# Patient Record
Sex: FEMALE | Race: ASIAN | NOT HISPANIC OR LATINO | Employment: OTHER | RURAL
[De-identification: names, ages, dates, MRNs, and addresses within clinical notes are randomized per-mention and may not be internally consistent; named-entity substitution may affect disease eponyms.]

---

## 2020-06-29 ENCOUNTER — HISTORICAL (OUTPATIENT)
Dept: ADMINISTRATIVE | Facility: HOSPITAL | Age: 80
End: 2020-06-29

## 2020-06-29 LAB
ALBUMIN SERPL BCP-MCNC: 4.1 G/DL (ref 3.5–5)
ALBUMIN/GLOB SERPL: 1 {RATIO}
ALP SERPL-CCNC: 83 U/L (ref 55–142)
ALT SERPL W P-5'-P-CCNC: 20 U/L (ref 13–56)
AST SERPL W P-5'-P-CCNC: 22 U/L (ref 15–37)
BASOPHILS # BLD AUTO: 0.03 X10E3/UL (ref 0–0.2)
BASOPHILS NFR BLD AUTO: 0.6 % (ref 0–1)
BILIRUB SERPL-MCNC: 0.3 MG/DL (ref 0–1.2)
BUN SERPL-MCNC: 21 MG/DL (ref 7–18)
BUN/CREAT SERPL: 14
CALCIUM SERPL-MCNC: 8.6 MG/DL (ref 8.5–10.1)
CHLORIDE SERPL-SCNC: 101 MMOL/L (ref 98–107)
CHOLEST SERPL-MCNC: 167 MG/DL
CO2 SERPL-SCNC: 23 MMOL/L (ref 21–32)
CREAT SERPL-MCNC: 1.51 MG/DL (ref 0.5–1.02)
EOSINOPHIL # BLD AUTO: 0.1 X10E3/UL (ref 0–0.5)
EOSINOPHIL NFR BLD AUTO: 2.1 % (ref 1–4)
ERYTHROCYTE [DISTWIDTH] IN BLOOD BY AUTOMATED COUNT: 12.7 % (ref 11.5–14.5)
EST. AVERAGE GLUCOSE BLD GHB EST-MCNC: 120 MG/DL
GLOBULIN SER-MCNC: 4 G/DL (ref 2–4)
GLUCOSE SERPL-MCNC: 101 MG/DL (ref 74–106)
HBA1C MFR BLD HPLC: 6.2 %
HCT VFR BLD AUTO: 32.2 % (ref 38–47)
HDLC SERPL-MCNC: 47 MG/DL
HGB BLD-MCNC: 10.3 G/DL (ref 12–16)
IMM GRANULOCYTES # BLD AUTO: 0.01 X10E3/UL (ref 0–0.04)
IMM GRANULOCYTES NFR BLD: 0.2 % (ref 0–0.4)
LDLC SERPL CALC-MCNC: 95 MG/DL
LYMPHOCYTES # BLD AUTO: 1.24 X10E3/UL (ref 1–4.8)
LYMPHOCYTES NFR BLD AUTO: 26.1 % (ref 27–41)
MCH RBC QN AUTO: 28.8 PG (ref 27–31)
MCHC RBC AUTO-ENTMCNC: 32 G/DL (ref 32–36)
MCV RBC AUTO: 89.9 FL (ref 80–96)
MONOCYTES # BLD AUTO: 0.31 X10E3/UL (ref 0–0.8)
MONOCYTES NFR BLD AUTO: 6.5 % (ref 2–6)
MPC BLD CALC-MCNC: 10.2 FL (ref 9.4–12.4)
NEUTROPHILS # BLD AUTO: 3.07 X10E3/UL (ref 1.8–7.7)
NEUTROPHILS NFR BLD AUTO: 64.5 % (ref 53–65)
NONHDLC SERPL-MCNC: 120 MG/DL
NRBC # BLD AUTO: 0 X10E3/UL (ref 0–0)
NRBC, AUTO (.00): 0 /100 (ref 0–0)
PLATELET # BLD AUTO: 260 X10E3/UL (ref 150–400)
POTASSIUM SERPL-SCNC: 4.4 MMOL/L (ref 3.5–5.1)
PROT SERPL-MCNC: 8.1 G/DL (ref 6.4–8.2)
RBC # BLD AUTO: 3.58 X10E6/UL (ref 4.2–5.4)
SODIUM SERPL-SCNC: 132 MMOL/L (ref 136–145)
TRIGL SERPL-MCNC: 126 MG/DL
TSH SERPL DL<=0.005 MIU/L-ACNC: 0.55 UIU/ML (ref 0.36–3.74)
VLDLC SERPL-MCNC: 25 MG/DL
WBC # BLD AUTO: 4.76 X10E3/UL (ref 4.5–11)

## 2020-06-30 LAB — LEVETIRACETAM SERPL-MCNC: 38.8 UG/ML (ref 12–46)

## 2021-04-27 RX ORDER — LEVETIRACETAM 500 MG/1
500 TABLET ORAL 2 TIMES DAILY
COMMUNITY
End: 2021-09-13 | Stop reason: SDUPTHER

## 2021-05-10 ENCOUNTER — OFFICE VISIT (OUTPATIENT)
Dept: NEUROLOGY | Facility: CLINIC | Age: 81
End: 2021-05-10
Payer: MEDICARE

## 2021-05-10 VITALS
WEIGHT: 146 LBS | SYSTOLIC BLOOD PRESSURE: 106 MMHG | HEART RATE: 76 BPM | OXYGEN SATURATION: 93 % | DIASTOLIC BLOOD PRESSURE: 60 MMHG

## 2021-05-10 DIAGNOSIS — I63.9 CEREBROVASCULAR ACCIDENT (CVA), UNSPECIFIED MECHANISM: ICD-10-CM

## 2021-05-10 DIAGNOSIS — R56.9 SEIZURE: Primary | ICD-10-CM

## 2021-05-10 DIAGNOSIS — G81.94 LEFT HEMIPARESIS: ICD-10-CM

## 2021-05-10 PROCEDURE — 99213 OFFICE O/P EST LOW 20 MIN: CPT | Mod: PBBFAC | Performed by: NURSE PRACTITIONER

## 2021-05-10 PROCEDURE — 99213 OFFICE O/P EST LOW 20 MIN: CPT | Mod: PBBFAC,,, | Performed by: NURSE PRACTITIONER

## 2021-05-10 PROCEDURE — 99214 OFFICE O/P EST MOD 30 MIN: CPT | Mod: S$PBB,,, | Performed by: NURSE PRACTITIONER

## 2021-05-10 PROCEDURE — 99214 PR OFFICE/OUTPT VISIT, EST, LEVL IV, 30-39 MIN: ICD-10-PCS | Mod: S$PBB,,, | Performed by: NURSE PRACTITIONER

## 2021-05-10 PROCEDURE — 99213 HC OFFICE/OUTPT VISIT, EST, LEVL III, 20-29 MIN: ICD-10-PCS | Mod: PBBFAC,,, | Performed by: NURSE PRACTITIONER

## 2021-05-10 RX ORDER — ATORVASTATIN CALCIUM 20 MG/1
TABLET, FILM COATED ORAL
COMMUNITY
Start: 2021-03-01 | End: 2022-06-21 | Stop reason: SDUPTHER

## 2021-05-10 RX ORDER — AMLODIPINE BESYLATE 5 MG/1
TABLET ORAL
COMMUNITY
Start: 2021-03-01 | End: 2022-06-21 | Stop reason: SDUPTHER

## 2021-05-10 RX ORDER — OMEPRAZOLE 20 MG/1
CAPSULE, DELAYED RELEASE ORAL
COMMUNITY
Start: 2021-03-01 | End: 2022-06-21 | Stop reason: SDUPTHER

## 2021-05-10 RX ORDER — PIOGLITAZONEHYDROCHLORIDE 30 MG/1
TABLET ORAL
COMMUNITY
Start: 2021-02-12 | End: 2022-06-21 | Stop reason: SDUPTHER

## 2021-09-13 ENCOUNTER — OFFICE VISIT (OUTPATIENT)
Dept: NEUROLOGY | Facility: CLINIC | Age: 81
End: 2021-09-13
Payer: MEDICARE

## 2021-09-13 VITALS
DIASTOLIC BLOOD PRESSURE: 60 MMHG | HEIGHT: 62 IN | SYSTOLIC BLOOD PRESSURE: 110 MMHG | WEIGHT: 145 LBS | BODY MASS INDEX: 26.68 KG/M2 | OXYGEN SATURATION: 96 % | HEART RATE: 70 BPM

## 2021-09-13 DIAGNOSIS — R42 DIZZINESS: ICD-10-CM

## 2021-09-13 DIAGNOSIS — I63.9 CEREBROVASCULAR ACCIDENT (CVA), UNSPECIFIED MECHANISM: ICD-10-CM

## 2021-09-13 DIAGNOSIS — R56.9 SEIZURE: Primary | ICD-10-CM

## 2021-09-13 DIAGNOSIS — G81.94 LEFT HEMIPARESIS: ICD-10-CM

## 2021-09-13 PROCEDURE — 99213 PR OFFICE/OUTPT VISIT, EST, LEVL III, 20-29 MIN: ICD-10-PCS | Mod: S$PBB,,, | Performed by: NURSE PRACTITIONER

## 2021-09-13 PROCEDURE — 99214 OFFICE O/P EST MOD 30 MIN: CPT | Mod: PBBFAC | Performed by: NURSE PRACTITIONER

## 2021-09-13 PROCEDURE — 99213 OFFICE O/P EST LOW 20 MIN: CPT | Mod: S$PBB,,, | Performed by: NURSE PRACTITIONER

## 2021-09-13 RX ORDER — LEVETIRACETAM 500 MG/1
500 TABLET ORAL 2 TIMES DAILY
Qty: 180 TABLET | Refills: 3 | Status: SHIPPED | OUTPATIENT
Start: 2021-09-13 | End: 2022-06-21 | Stop reason: SDUPTHER

## 2021-09-13 RX ORDER — MECLIZINE HYDROCHLORIDE 25 MG/1
25 TABLET ORAL 3 TIMES DAILY PRN
Qty: 30 TABLET | Refills: 3 | Status: SHIPPED | OUTPATIENT
Start: 2021-09-13 | End: 2021-12-13 | Stop reason: SDUPTHER

## 2021-12-13 ENCOUNTER — OFFICE VISIT (OUTPATIENT)
Dept: NEUROLOGY | Facility: CLINIC | Age: 81
End: 2021-12-13
Payer: MEDICARE

## 2021-12-13 VITALS
OXYGEN SATURATION: 99 % | BODY MASS INDEX: 26.68 KG/M2 | HEIGHT: 62 IN | SYSTOLIC BLOOD PRESSURE: 114 MMHG | RESPIRATION RATE: 18 BRPM | HEART RATE: 104 BPM | DIASTOLIC BLOOD PRESSURE: 68 MMHG | WEIGHT: 145 LBS

## 2021-12-13 DIAGNOSIS — I63.9 CEREBROVASCULAR ACCIDENT (CVA), UNSPECIFIED MECHANISM: Chronic | ICD-10-CM

## 2021-12-13 DIAGNOSIS — G81.94 LEFT HEMIPARESIS: Chronic | ICD-10-CM

## 2021-12-13 DIAGNOSIS — R42 DIZZINESS: ICD-10-CM

## 2021-12-13 DIAGNOSIS — R56.9 SEIZURE: Primary | Chronic | ICD-10-CM

## 2021-12-13 PROCEDURE — 99213 OFFICE O/P EST LOW 20 MIN: CPT | Mod: S$PBB,,, | Performed by: NURSE PRACTITIONER

## 2021-12-13 PROCEDURE — 99214 OFFICE O/P EST MOD 30 MIN: CPT | Mod: PBBFAC | Performed by: NURSE PRACTITIONER

## 2021-12-13 PROCEDURE — 99213 PR OFFICE/OUTPT VISIT, EST, LEVL III, 20-29 MIN: ICD-10-PCS | Mod: S$PBB,,, | Performed by: NURSE PRACTITIONER

## 2021-12-13 RX ORDER — NAPROXEN 375 MG/1
375 TABLET ORAL 2 TIMES DAILY PRN
Qty: 60 TABLET | Refills: 0 | Status: SHIPPED | OUTPATIENT
Start: 2021-12-13 | End: 2022-03-14 | Stop reason: SDUPTHER

## 2021-12-13 RX ORDER — MECLIZINE HYDROCHLORIDE 25 MG/1
25 TABLET ORAL 3 TIMES DAILY PRN
Qty: 30 TABLET | Refills: 3 | Status: SHIPPED | OUTPATIENT
Start: 2021-12-13 | End: 2022-06-21

## 2022-03-14 ENCOUNTER — OFFICE VISIT (OUTPATIENT)
Dept: NEUROLOGY | Facility: CLINIC | Age: 82
End: 2022-03-14
Payer: MEDICARE

## 2022-03-14 VITALS
WEIGHT: 145 LBS | OXYGEN SATURATION: 95 % | HEIGHT: 62 IN | RESPIRATION RATE: 18 BRPM | BODY MASS INDEX: 26.68 KG/M2 | SYSTOLIC BLOOD PRESSURE: 112 MMHG | DIASTOLIC BLOOD PRESSURE: 68 MMHG | HEART RATE: 80 BPM

## 2022-03-14 DIAGNOSIS — M54.9 BACK PAIN, UNSPECIFIED BACK LOCATION, UNSPECIFIED BACK PAIN LATERALITY, UNSPECIFIED CHRONICITY: ICD-10-CM

## 2022-03-14 DIAGNOSIS — R42 DIZZINESS: ICD-10-CM

## 2022-03-14 DIAGNOSIS — I63.9 CEREBROVASCULAR ACCIDENT (CVA), UNSPECIFIED MECHANISM: ICD-10-CM

## 2022-03-14 DIAGNOSIS — G81.94 LEFT HEMIPARESIS: ICD-10-CM

## 2022-03-14 DIAGNOSIS — R56.9 SEIZURE: Primary | Chronic | ICD-10-CM

## 2022-03-14 PROCEDURE — 99213 PR OFFICE/OUTPT VISIT, EST, LEVL III, 20-29 MIN: ICD-10-PCS | Mod: S$PBB,,, | Performed by: NURSE PRACTITIONER

## 2022-03-14 PROCEDURE — 99213 OFFICE O/P EST LOW 20 MIN: CPT | Mod: S$PBB,,, | Performed by: NURSE PRACTITIONER

## 2022-03-14 PROCEDURE — 99214 OFFICE O/P EST MOD 30 MIN: CPT | Mod: PBBFAC | Performed by: NURSE PRACTITIONER

## 2022-03-14 RX ORDER — NAPROXEN 375 MG/1
375 TABLET ORAL 2 TIMES DAILY PRN
Qty: 60 TABLET | Refills: 2 | Status: SHIPPED | OUTPATIENT
Start: 2022-03-14 | End: 2022-06-21

## 2022-03-14 NOTE — PATIENT INSTRUCTIONS
1.  Continue current medication including keppra as directed    2. Notify clinic if any seizures    3. Antivert as needed for dizziness    4. Naproxen as directed as needed for lower back pain    Seizure Precautions:  1. Take medications as directed  2. Avoid open flames and hot surfaces such as fires and grills  3. Avoid elevations such as ladders or stools  4. Avoid swimming alone  5. Make sure getting plenty of sleep, recommend 7-8 hours per day  6. Avoid alcohol and illicit drugs  7. No driving or operating heavy machinery for 6 months after last known seizure    Stroke risk modifiers:    1. Good sleep habits, recommend at least 7 hours total sleep daily  2. Continue management of blood pressure and cholesterol including medications, exercise, and good eating habits  3. Exercise as much as possible, recommend 5 days of the week for 30 minutes each day  4. Avoid smoking and alcohol  5. Go to the nearest emergency department immediately if any new or worsening weakness, speech difficulty, or numbness

## 2022-03-14 NOTE — PROGRESS NOTES
Subjective:       Patient ID: Zbigniew Ventura is a 81 y.o. female     Chief Complaint:    No chief complaint on file.       Allergies:  Patient has no known allergies.    Current Medications:    Outpatient Encounter Medications as of 3/14/2022   Medication Sig Dispense Refill    amLODIPine (NORVASC) 5 MG tablet       atorvastatin (LIPITOR) 20 MG tablet       levETIRAcetam (KEPPRA) 500 MG Tab Take 1 tablet (500 mg total) by mouth 2 (two) times daily. 180 tablet 3    meclizine (ANTIVERT) 25 mg tablet Take 1 tablet (25 mg total) by mouth 3 (three) times daily as needed for Dizziness. 30 tablet 3    naproxen (EC-NAPROSYN) 375 MG TbEC EC tablet Take 1 tablet (375 mg total) by mouth 2 (two) times daily as needed (knee pain). 60 tablet 0    omeprazole (PRILOSEC) 20 MG capsule       pioglitazone (ACTOS) 30 MG tablet        No facility-administered encounter medications on file as of 3/14/2022.       History of Present Illness  This Is a 81-year-old past history of diabetes and right hemispheric stroke and seizure.    Her family in room assists with communication.    They deny any seizures since her last visit with us and no new CVA symptoms.  She is on triple therapy including ASA 81mg as well as keppra 500mg BID which is filled by her primary care and denies any side effects.    She does have residual left-sided weakness from stroke but it is minimal. In     They do report that she has occasional episodes of dizziness, this since her prior CVA.  Her  has let her use his prescribed mecline which worked very well and she inquires about her own prescription.         Review of Systems  Review of Systems   Constitutional: Negative for diaphoresis and fever.   HENT: Negative for congestion, hearing loss and tinnitus.    Eyes: Negative for blurred vision, double vision, photophobia, discharge and redness.   Respiratory: Negative for cough and shortness of breath.    Cardiovascular: Negative for chest pain.    Gastrointestinal: Negative for abdominal pain, nausea and vomiting.   Musculoskeletal: Positive for joint pain and myalgias. Negative for back pain, falls and neck pain.   Skin: Negative for itching and rash.   Neurological: Positive for dizziness and weakness. Negative for tremors, sensory change, speech change, focal weakness, seizures, loss of consciousness and headaches.   Psychiatric/Behavioral: Negative for depression, hallucinations and memory loss. The patient does not have insomnia.    All other systems reviewed and are negative.     Objective:     NEUROLOGICAL EXAMINATION:     MENTAL STATUS   Oriented to person, place, and time.   Attention: normal. Concentration: normal.   Speech: speech is normal   Level of consciousness: alert  Knowledge: good and consistent with education.   Normal comprehension.     CRANIAL NERVES     CN II   Visual fields full to confrontation.   Visual acuity: normal  Right visual field deficit: none  Left visual field deficit: none     CN III, IV, VI   Pupils are equal, round, and reactive to light.  Extraocular motions are normal.   Right pupil: Size: 3 mm. Shape: regular. Reactivity: brisk. Consensual response: intact. Accommodation: intact.   Left pupil: Size: 3 mm. Shape: regular. Reactivity: brisk. Consensual response: intact. Accommodation: intact.   CN III: no CN III palsy  CN VI: no CN VI palsy  Nystagmus: none   Diplopia: none  Upgaze: normal  Downgaze: normal  Conjugate gaze: present  Vestibulo-ocular reflex: present    CN V   Facial sensation intact.   Right facial sensation deficit: none  Left facial sensation deficit: none  Right corneal reflex: normal  Left corneal reflex: normal    CN VII   Facial expression full, symmetric.   Right facial weakness: none  Left facial weakness: none  Right taste: normal  Left taste: normal    CN VIII   CN VIII normal.   Hearing: intact    CN IX, X   CN IX normal.   CN X normal.   Palate: symmetric    CN XI   CN XI normal.   Right  sternocleidomastoid strength: normal  Left sternocleidomastoid strength: normal  Right trapezius strength: normal  Left trapezius strength: normal    CN XII   CN XII normal.   Tongue: not atrophic  Fasciculations: absent  Tongue deviation: none    MOTOR EXAM   Muscle bulk: normal  Overall muscle tone: normal  Right arm tone: normal  Left arm tone: normal  Right arm pronator drift: absent  Left arm pronator drift: absent  Right leg tone: normal  Left leg tone: normal    Strength   Right neck flexion: 5/5  Left neck flexion: 5/5  Right neck extension: 5/5  Left neck extension: 5/5  Right deltoid: 5/5  Left deltoid: 4/5  Right biceps: 5/5  Left biceps: 4/5  Right triceps: 5/5  Left triceps: 4/5  Right wrist flexion: 5/5  Left wrist flexion: 4/5  Right wrist extension: 5/5  Left wrist extension: 4/5  Right interossei: 5/5  Left interossei: 5/5  Right iliopsoas: 5/5  Left iliopsoas: 5/5  Right quadriceps: 5/5  Left quadriceps: 5/5  Right hamstrin/5  Left hamstrin/5  Right anterior tibial: 5/5  Left anterior tibial: 5/5  Right posterior tibial: 5/5  Left posterior tibial: 5/5  Right gastroc: 5/5  Left gastroc: 5/5    REFLEXES     Reflexes   Right brachioradialis: 2+  Left brachioradialis: 2+  Right biceps: 2+  Left biceps: 2+  Right triceps: 2+  Left triceps: 2+  Right patellar: 2+  Left patellar: 2+  Right achilles: 2+  Left achilles: 2+  Right plantar: normal  Left plantar: normal  Right Calvin: absent  Left Calvin: absent  Right ankle clonus: absent  Left ankle clonus: absent  Right pendular knee jerk: absent  Left pendular knee jerk: absent    SENSORY EXAM   Light touch normal.   Right arm light touch: normal  Left arm light touch: normal  Right leg light touch: normal  Left leg light touch: normal  Vibration normal.   Right arm vibration: normal  Left arm vibration: normal  Right leg vibration: normal  Left leg vibration: normal  Proprioception normal.   Right arm proprioception: normal  Left arm  proprioception: normal  Right leg proprioception: normal  Left leg proprioception: normal  Pinprick normal.   Right arm pinprick: normal  Left arm pinprick: normal  Right leg pinprick: normal  Left leg pinprick: normal  Graphesthesia: normal  Romberg: negative  Stereognosis: normal    GAIT AND COORDINATION     Gait  Gait: normal     Coordination   Finger to nose coordination: normal  Heel to shin coordination: normal  Tandem walking coordination: normal    Tremor   Resting tremor: absent  Intention tremor: absent  Action tremor: absent       Physical Exam  Vitals and nursing note reviewed.   Constitutional:       Appearance: Normal appearance.   HENT:      Head: Normocephalic.   Eyes:      Extraocular Movements: Extraocular movements intact and EOM normal.      Pupils: Pupils are equal, round, and reactive to light.   Cardiovascular:      Rate and Rhythm: Normal rate and regular rhythm.   Pulmonary:      Effort: Pulmonary effort is normal.      Breath sounds: Normal breath sounds.   Musculoskeletal:         General: No swelling or tenderness. Normal range of motion.      Cervical back: Normal range of motion and neck supple.      Right lower leg: No edema.      Left lower leg: No edema.   Skin:     General: Skin is warm and dry.      Coloration: Skin is not jaundiced.      Findings: No rash.   Neurological:      General: No focal deficit present.      Mental Status: She is alert and oriented to person, place, and time.      GCS: GCS eye subscore is 4. GCS verbal subscore is 5. GCS motor subscore is 6.      Cranial Nerves: No cranial nerve deficit.      Sensory: No sensory deficit.      Motor: Motor function is intact. No weakness.      Coordination: Coordination is intact. Coordination normal. Finger-Nose-Finger Test, Heel to Shin Test and Romberg Test normal.      Gait: Gait is intact. Gait and tandem walk normal.      Deep Tendon Reflexes: Reflexes normal.      Reflex Scores:       Tricep reflexes are 2+ on the  right side and 2+ on the left side.       Bicep reflexes are 2+ on the right side and 2+ on the left side.       Brachioradialis reflexes are 2+ on the right side and 2+ on the left side.       Patellar reflexes are 2+ on the right side and 2+ on the left side.       Achilles reflexes are 2+ on the right side and 2+ on the left side.  Psychiatric:         Mood and Affect: Mood normal.         Speech: Speech normal.         Behavior: Behavior normal.          Assessment:     Problem List Items Addressed This Visit    None          Primary Diagnosis and ICD10  No primary diagnosis found.    Plan:     There are no Patient Instructions on file for this visit.    There are no discontinued medications.    Requested Prescriptions      No prescriptions requested or ordered in this encounter       No orders of the defined types were placed in this encounter.

## 2022-06-21 ENCOUNTER — OFFICE VISIT (OUTPATIENT)
Dept: FAMILY MEDICINE | Facility: CLINIC | Age: 82
End: 2022-06-21
Payer: MEDICARE

## 2022-06-21 ENCOUNTER — APPOINTMENT (OUTPATIENT)
Dept: RADIOLOGY | Facility: CLINIC | Age: 82
End: 2022-06-21
Attending: INTERNAL MEDICINE
Payer: MEDICARE

## 2022-06-21 VITALS
HEART RATE: 76 BPM | WEIGHT: 139.81 LBS | TEMPERATURE: 98 F | RESPIRATION RATE: 20 BRPM | OXYGEN SATURATION: 97 % | BODY MASS INDEX: 25.73 KG/M2 | DIASTOLIC BLOOD PRESSURE: 60 MMHG | HEIGHT: 62 IN | SYSTOLIC BLOOD PRESSURE: 102 MMHG

## 2022-06-21 DIAGNOSIS — R60.0 LEG EDEMA, RIGHT: ICD-10-CM

## 2022-06-21 DIAGNOSIS — R06.02 SHORTNESS OF BREATH: ICD-10-CM

## 2022-06-21 DIAGNOSIS — R42 DIZZINESS: ICD-10-CM

## 2022-06-21 DIAGNOSIS — G81.94 LEFT HEMIPARESIS: ICD-10-CM

## 2022-06-21 DIAGNOSIS — R90.89 OTHER ABNORMAL FINDINGS ON DIAGNOSTIC IMAGING OF CENTRAL NERVOUS SYSTEM: ICD-10-CM

## 2022-06-21 DIAGNOSIS — M25.562 CHRONIC PAIN OF BOTH KNEES: ICD-10-CM

## 2022-06-21 DIAGNOSIS — R51.9 NONINTRACTABLE HEADACHE, UNSPECIFIED CHRONICITY PATTERN, UNSPECIFIED HEADACHE TYPE: ICD-10-CM

## 2022-06-21 DIAGNOSIS — G89.29 CHRONIC PAIN OF BOTH KNEES: ICD-10-CM

## 2022-06-21 DIAGNOSIS — I63.9 CEREBROVASCULAR ACCIDENT (CVA), UNSPECIFIED MECHANISM: Primary | ICD-10-CM

## 2022-06-21 DIAGNOSIS — M25.561 CHRONIC PAIN OF BOTH KNEES: ICD-10-CM

## 2022-06-21 DIAGNOSIS — R56.9 SEIZURE: ICD-10-CM

## 2022-06-21 LAB
ALBUMIN SERPL BCP-MCNC: 4 G/DL (ref 3.5–5)
ALP SERPL-CCNC: 68 U/L (ref 55–142)
ALT SERPL W P-5'-P-CCNC: 23 U/L (ref 13–56)
ANION GAP SERPL CALCULATED.3IONS-SCNC: 8 MMOL/L (ref 7–16)
AST SERPL W P-5'-P-CCNC: 20 U/L (ref 15–37)
BASOPHILS # BLD AUTO: 0.04 K/UL (ref 0–0.2)
BASOPHILS NFR BLD AUTO: 0.5 % (ref 0–1)
BILIRUB DIRECT SERPL-MCNC: 0.1 MG/DL (ref 0–0.2)
BILIRUB SERPL-MCNC: 0.4 MG/DL (ref 0–1.2)
BILIRUB UR QL STRIP: NEGATIVE
BUN SERPL-MCNC: 13 MG/DL (ref 7–18)
BUN/CREAT SERPL: 12 (ref 6–20)
CALCIUM SERPL-MCNC: 8.9 MG/DL (ref 8.5–10.1)
CHLORIDE SERPL-SCNC: 100 MMOL/L (ref 98–107)
CLARITY UR: CLEAR
CO2 SERPL-SCNC: 28 MMOL/L (ref 21–32)
COLOR UR: YELLOW
CREAT SERPL-MCNC: 1.05 MG/DL (ref 0.55–1.02)
D DIMER PPP FEU-MCNC: 0.75 ΜG/ML (ref 0–0.47)
DIFFERENTIAL METHOD BLD: ABNORMAL
EOSINOPHIL # BLD AUTO: 0.13 K/UL (ref 0–0.5)
EOSINOPHIL NFR BLD AUTO: 1.7 % (ref 1–4)
ERYTHROCYTE [DISTWIDTH] IN BLOOD BY AUTOMATED COUNT: 13.1 % (ref 11.5–14.5)
GLUCOSE SERPL-MCNC: 87 MG/DL (ref 74–106)
GLUCOSE UR STRIP-MCNC: NEGATIVE MG/DL
HCT VFR BLD AUTO: 30.3 % (ref 38–47)
HGB BLD-MCNC: 10 G/DL (ref 12–16)
IMM GRANULOCYTES # BLD AUTO: 0.02 K/UL (ref 0–0.04)
IMM GRANULOCYTES NFR BLD: 0.3 % (ref 0–0.4)
KETONES UR STRIP-SCNC: NEGATIVE MG/DL
LEUKOCYTE ESTERASE UR QL STRIP: NEGATIVE
LYMPHOCYTES # BLD AUTO: 1.03 K/UL (ref 1–4.8)
LYMPHOCYTES NFR BLD AUTO: 13.5 % (ref 27–41)
MCH RBC QN AUTO: 29.8 PG (ref 27–31)
MCHC RBC AUTO-ENTMCNC: 33 G/DL (ref 32–36)
MCV RBC AUTO: 90.2 FL (ref 80–96)
MONOCYTES # BLD AUTO: 0.48 K/UL (ref 0–0.8)
MONOCYTES NFR BLD AUTO: 6.3 % (ref 2–6)
MPC BLD CALC-MCNC: 9.1 FL (ref 9.4–12.4)
NEUTROPHILS # BLD AUTO: 5.94 K/UL (ref 1.8–7.7)
NEUTROPHILS NFR BLD AUTO: 77.7 % (ref 53–65)
NITRITE UR QL STRIP: NEGATIVE
NRBC # BLD AUTO: 0 X10E3/UL
NRBC, AUTO (.00): 0 %
PH UR STRIP: 6 PH UNITS
PLATELET # BLD AUTO: 281 K/UL (ref 150–400)
POTASSIUM SERPL-SCNC: 4.3 MMOL/L (ref 3.5–5.1)
PROT SERPL-MCNC: 7.9 G/DL (ref 6.4–8.2)
PROT UR QL STRIP: NEGATIVE
RBC # BLD AUTO: 3.36 M/UL (ref 4.2–5.4)
RBC # UR STRIP: NEGATIVE /UL
SODIUM SERPL-SCNC: 132 MMOL/L (ref 136–145)
SP GR UR STRIP: <=1.005
TSH SERPL DL<=0.005 MIU/L-ACNC: 0.78 UIU/ML (ref 0.36–3.74)
UROBILINOGEN UR STRIP-ACNC: 0.2 MG/DL
WBC # BLD AUTO: 7.64 K/UL (ref 4.5–11)

## 2022-06-21 PROCEDURE — 81003 URINALYSIS AUTO W/O SCOPE: CPT | Mod: QW,,, | Performed by: CLINICAL MEDICAL LABORATORY

## 2022-06-21 PROCEDURE — 84443 TSH: ICD-10-PCS | Mod: ,,, | Performed by: CLINICAL MEDICAL LABORATORY

## 2022-06-21 PROCEDURE — 81003 URINALYSIS, REFLEX TO URINE CULTURE: ICD-10-PCS | Mod: QW,,, | Performed by: CLINICAL MEDICAL LABORATORY

## 2022-06-21 PROCEDURE — 84443 ASSAY THYROID STIM HORMONE: CPT | Mod: ,,, | Performed by: CLINICAL MEDICAL LABORATORY

## 2022-06-21 PROCEDURE — 99204 OFFICE O/P NEW MOD 45 MIN: CPT | Mod: ,,, | Performed by: INTERNAL MEDICINE

## 2022-06-21 PROCEDURE — 82248 BILIRUBIN DIRECT: CPT | Mod: ,,, | Performed by: CLINICAL MEDICAL LABORATORY

## 2022-06-21 PROCEDURE — 85025 CBC WITH DIFFERENTIAL: ICD-10-PCS | Mod: ,,, | Performed by: CLINICAL MEDICAL LABORATORY

## 2022-06-21 PROCEDURE — 85025 COMPLETE CBC W/AUTO DIFF WBC: CPT | Mod: ,,, | Performed by: CLINICAL MEDICAL LABORATORY

## 2022-06-21 PROCEDURE — 80053 COMPREHEN METABOLIC PANEL: CPT | Mod: ,,, | Performed by: CLINICAL MEDICAL LABORATORY

## 2022-06-21 PROCEDURE — 71046 X-RAY EXAM CHEST 2 VIEWS: CPT | Mod: TC,RHCUB | Performed by: INTERNAL MEDICINE

## 2022-06-21 PROCEDURE — 99204 PR OFFICE/OUTPT VISIT, NEW, LEVL IV, 45-59 MIN: ICD-10-PCS | Mod: ,,, | Performed by: INTERNAL MEDICINE

## 2022-06-21 PROCEDURE — 80053 HEPATIC FUNCTION PANEL: ICD-10-PCS | Mod: ,,, | Performed by: CLINICAL MEDICAL LABORATORY

## 2022-06-21 PROCEDURE — 82248 HEPATIC FUNCTION PANEL: ICD-10-PCS | Mod: ,,, | Performed by: CLINICAL MEDICAL LABORATORY

## 2022-06-21 RX ORDER — NAPROXEN 375 MG/1
TABLET ORAL
COMMUNITY
Start: 2022-05-16 | End: 2022-06-21 | Stop reason: SDUPTHER

## 2022-06-21 RX ORDER — FERROUS SULFATE TAB 325 MG (65 MG ELEMENTAL FE) 325 (65 FE) MG
TAB ORAL
Qty: 180 TABLET | Refills: 1 | Status: SHIPPED | OUTPATIENT
Start: 2022-06-21 | End: 2022-10-06 | Stop reason: SDUPTHER

## 2022-06-21 RX ORDER — AMLODIPINE BESYLATE 5 MG/1
5 TABLET ORAL DAILY
Qty: 90 TABLET | Refills: 1 | Status: SHIPPED | OUTPATIENT
Start: 2022-06-21 | End: 2022-10-06 | Stop reason: SDUPTHER

## 2022-06-21 RX ORDER — FERROUS SULFATE TAB 325 MG (65 MG ELEMENTAL FE) 325 (65 FE) MG
TAB ORAL
COMMUNITY
Start: 2022-05-10 | End: 2022-06-21 | Stop reason: SDUPTHER

## 2022-06-21 RX ORDER — MECLIZINE HCL 25MG 25 MG/1
TABLET, CHEWABLE ORAL
COMMUNITY
Start: 2022-06-13 | End: 2022-06-21 | Stop reason: SDUPTHER

## 2022-06-21 RX ORDER — MECLIZINE HCL 25MG 25 MG/1
TABLET, CHEWABLE ORAL
Qty: 180 TABLET | Refills: 1 | Status: SHIPPED | OUTPATIENT
Start: 2022-06-21 | End: 2022-10-06 | Stop reason: SDUPTHER

## 2022-06-21 RX ORDER — PIOGLITAZONEHYDROCHLORIDE 30 MG/1
30 TABLET ORAL DAILY
Qty: 90 TABLET | Refills: 1 | Status: SHIPPED | OUTPATIENT
Start: 2022-06-21 | End: 2022-10-06 | Stop reason: SDUPTHER

## 2022-06-21 RX ORDER — NAPROXEN 375 MG/1
TABLET ORAL
Qty: 180 TABLET | Refills: 1 | Status: SHIPPED | OUTPATIENT
Start: 2022-06-21 | End: 2022-10-06 | Stop reason: SDUPTHER

## 2022-06-21 RX ORDER — OMEPRAZOLE 20 MG/1
20 CAPSULE, DELAYED RELEASE ORAL DAILY
Qty: 90 CAPSULE | Refills: 1 | Status: SHIPPED | OUTPATIENT
Start: 2022-06-21 | End: 2022-10-06 | Stop reason: SDUPTHER

## 2022-06-21 RX ORDER — ATORVASTATIN CALCIUM 20 MG/1
20 TABLET, FILM COATED ORAL DAILY
Qty: 90 TABLET | Refills: 1 | Status: SHIPPED | OUTPATIENT
Start: 2022-06-21 | End: 2022-10-06 | Stop reason: SDUPTHER

## 2022-06-21 RX ORDER — LEVETIRACETAM 500 MG/1
500 TABLET ORAL 2 TIMES DAILY
Qty: 180 TABLET | Refills: 1 | Status: SHIPPED | OUTPATIENT
Start: 2022-06-21 | End: 2022-07-26 | Stop reason: SDUPTHER

## 2022-06-21 NOTE — PROGRESS NOTES
Subjective:       Patient ID: Zbigniew Ventura is a 82 y.o. female.    Chief Complaint: Establish Care and Medication Refill    Patient is here today for new patient evaluation to establish care. Patient has a past medical history of seizures, CVA with left side residual weakness, hyperlipidemia, and hypertension. Patient is complaining of feeling hot, tired, and weak. She also has a headache today and states has some dizziness also. States the dizziness makes it hard for her to stand and cook at home. Patient also reports shortness of breath and leg edema. Mild swelling of left leg seen on exam but reports no pain. Will order baseline labs. Will order D-Dimer level and xray chest. Will also order venous doppler of right lower extremity. Will refer for PT with [facility] for her weakness. Patient also endorses bilateral knee pain upon exam. Will order xray of bilateral knees. Will follow in 2 weeks.     Medication Refill  Associated symptoms include arthralgias, fatigue, headaches, vertigo and weakness.       Current Medications:    Current Outpatient Medications:     amLODIPine (NORVASC) 5 MG tablet, Take 1 tablet (5 mg total) by mouth once daily., Disp: 90 tablet, Rfl: 1    atorvastatin (LIPITOR) 20 MG tablet, Take 1 tablet (20 mg total) by mouth once daily., Disp: 90 tablet, Rfl: 1    FEROSUL 325 mg (65 mg iron) Tab tablet, TAKE ONE (1) TABLET BY MOUTH TWICE DAILY, Disp: 180 tablet, Rfl: 1    levETIRAcetam (KEPPRA) 500 MG Tab, Take 1 tablet (500 mg total) by mouth 2 (two) times daily., Disp: 180 tablet, Rfl: 1    meclizine (ANTIVERT) 25 MG tablet, TAKE ONE (1) TABLET BY MOUTH EVERY 12 HOURS AS NEEDED, Disp: 180 tablet, Rfl: 1    naproxen (NAPROSYN) 375 MG tablet, TAKE ONE (1) TABLET (375 MG TOTAL) BY MOUTH  TWICE DAILY WITH FOOD AS NEEDED FOR KNEE PAIN, Disp: 180 tablet, Rfl: 1    omeprazole (PRILOSEC) 20 MG capsule, Take 1 capsule (20 mg total) by mouth once daily., Disp: 90 capsule, Rfl: 1     pioglitazone (ACTOS) 30 MG tablet, Take 1 tablet (30 mg total) by mouth once daily., Disp: 90 tablet, Rfl: 1    Last Labs:     No visits with results within 1 Month(s) from this visit.   Latest known visit with results is:   Lab Visit on 03/03/2022   Component Date Value    WBC 03/03/2022 5.32     RBC 03/03/2022 3.59 (A)    Hemoglobin 03/03/2022 10.5 (A)    Hematocrit 03/03/2022 33.3 (A)    MCV 03/03/2022 92.8     MCH 03/03/2022 29.2     MCHC 03/03/2022 31.5 (A)    RDW 03/03/2022 14.6 (A)    Platelet Count 03/03/2022 302     MPV 03/03/2022 10.0     Neutrophils % 03/03/2022 59.2     Lymphocytes % 03/03/2022 29.3     Monocytes % 03/03/2022 6.0     Eosinophils % 03/03/2022 4.5 (A)    Basophils % 03/03/2022 0.8     Immature Granulocytes % 03/03/2022 0.2     nRBC, Auto 03/03/2022 0.0     Neutrophils, Abs 03/03/2022 3.15     Lymphocytes, Absolute 03/03/2022 1.56     Monocytes, Absolute 03/03/2022 0.32     Eosinophils, Absolute 03/03/2022 0.24     Basophils, Absolute 03/03/2022 0.04     Immature Granulocytes, A* 03/03/2022 0.01     nRBC, Absolute 03/03/2022 0.00     Diff Type 03/03/2022 Auto     Sodium 03/03/2022 135 (A)    Potassium 03/03/2022 4.7     Chloride 03/03/2022 102     CO2 03/03/2022 27     Anion Gap 03/03/2022 11     Glucose 03/03/2022 112 (A)    BUN 03/03/2022 15     Creatinine 03/03/2022 1.13 (A)    BUN/Creatinine Ratio 03/03/2022 13     Calcium 03/03/2022 8.7     Total Protein 03/03/2022 8.2     Albumin 03/03/2022 4.0     Globulin 03/03/2022 4.2 (A)    A/G Ratio 03/03/2022 1.0     Bilirubin, Total 03/03/2022 0.4     Alk Phos 03/03/2022 90     ALT 03/03/2022 49     AST 03/03/2022 38 (A)    eGFR 03/03/2022 49 (A)       Last Imaging:  X-Ray Knee 1 or 2 View Bilateral  Narrative: EXAMINATION:  XR KNEE 1 OR 2 VIEW BILATERAL    CLINICAL HISTORY:  Pain in right knee    COMPARISON:  None available    FINDINGS:  No evidence of fracture seen.  The alignment of the joints  appears normal.  Moderate right knee lateral compartment and mild to moderate remaining right knee compartment degenerative change is present.  Mild changes present in the left knee.  Moderate amount of vascular arterial calcification present.  No soft tissue abnormality is seen.  Impression: Knee osteoarthrosis as described above.    Electronically signed by: Mariano Garner  Date:    06/21/2022  Time:    12:32  X-Ray Chest PA And Lateral  Narrative: EXAMINATION:  XR CHEST PA AND LATERAL    CLINICAL HISTORY:  Shortness of breath    TECHNIQUE:  PA and lateral views of the chest were performed.    COMPARISON:  07/19/2017    FINDINGS:  Heart size normal.  The lungs are clear.  There is no pneumothorax or pleural effusion.  Degenerative changes seen in the spine on lateral view with compression deformities of the lumbar spine where there is a curvature present and partially assessed.  Impression: No acute findings.  Degenerative changes and compression deformities of the lumbar spine partially assessed.    Electronically signed by: Olivier Puri  Date:    06/21/2022  Time:    12:31         Review of Systems   Constitutional: Positive for fatigue.   Respiratory: Positive for shortness of breath.    Cardiovascular: Positive for leg swelling.   Musculoskeletal: Positive for arthralgias and leg pain.   Neurological: Positive for dizziness, vertigo, weakness and headaches.   All other systems reviewed and are negative.        Objective:      Physical Exam  Vitals reviewed.   Constitutional:       Appearance: Normal appearance.   Cardiovascular:      Rate and Rhythm: Normal rate and regular rhythm.      Pulses: Normal pulses.      Heart sounds: Normal heart sounds.   Pulmonary:      Effort: Pulmonary effort is normal.      Breath sounds: Normal breath sounds.   Abdominal:      General: Abdomen is flat. Bowel sounds are normal.      Palpations: Abdomen is soft.   Musculoskeletal:         General: Swelling present. Normal range of  motion.      Cervical back: Normal range of motion and neck supple.   Skin:     General: Skin is warm and dry.   Neurological:      General: No focal deficit present.      Mental Status: She is alert and oriented to person, place, and time. Mental status is at baseline.         Assessment:       1. Cerebrovascular accident (CVA), unspecified mechanism  Basic Metabolic Panel    CBC Auto Differential    TSH    Hepatic Function Panel    Urinalysis, Reflex to Urine Culture    Basic Metabolic Panel    CBC Auto Differential    TSH    Hepatic Function Panel    Urinalysis, Reflex to Urine Culture    Ambulatory referral/consult to Home Health   2. Seizure  levETIRAcetam (KEPPRA) 500 MG Tab   3. Left hemiparesis  D-Dimer, Quantitative    D-Dimer, Quantitative   4. Dizziness     5. Nonintractable headache, unspecified chronicity pattern, unspecified headache type     6. Leg edema, right  US Lower Extremity Veins Right   7. Shortness of breath  X-Ray Chest PA And Lateral   8. Chronic pain of both knees  X-Ray Knee 1 or 2 View Bilateral   9. Other abnormal findings on diagnostic imaging of central nervous system   TSH    TSH        Plan:         Zbigniew was seen today for establish care and medication refill.    Diagnoses and all orders for this visit:    Cerebrovascular accident (CVA), unspecified mechanism  -     Basic Metabolic Panel; Future  -     CBC Auto Differential; Future  -     TSH; Future  -     Hepatic Function Panel; Future  -     Urinalysis, Reflex to Urine Culture; Future  -     Basic Metabolic Panel  -     CBC Auto Differential  -     TSH  -     Hepatic Function Panel  -     Urinalysis, Reflex to Urine Culture  -     Ambulatory referral/consult to Home Health; Future    Seizure  -     levETIRAcetam (KEPPRA) 500 MG Tab; Take 1 tablet (500 mg total) by mouth 2 (two) times daily.    Left hemiparesis  -     D-Dimer, Quantitative; Future  -     D-Dimer, Quantitative    Dizziness    Nonintractable headache, unspecified  chronicity pattern, unspecified headache type    Leg edema, right  -     US Lower Extremity Veins Right; Future    Shortness of breath  -     X-Ray Chest PA And Lateral; Future    Chronic pain of both knees  -     X-Ray Knee 1 or 2 View Bilateral; Future    Other abnormal findings on diagnostic imaging of central nervous system   -     TSH; Future  -     TSH    Other orders  -     amLODIPine (NORVASC) 5 MG tablet; Take 1 tablet (5 mg total) by mouth once daily.  -     atorvastatin (LIPITOR) 20 MG tablet; Take 1 tablet (20 mg total) by mouth once daily.  -     FEROSUL 325 mg (65 mg iron) Tab tablet; TAKE ONE (1) TABLET BY MOUTH TWICE DAILY  -     meclizine (ANTIVERT) 25 MG tablet; TAKE ONE (1) TABLET BY MOUTH EVERY 12 HOURS AS NEEDED  -     naproxen (NAPROSYN) 375 MG tablet; TAKE ONE (1) TABLET (375 MG TOTAL) BY MOUTH  TWICE DAILY WITH FOOD AS NEEDED FOR KNEE PAIN  -     omeprazole (PRILOSEC) 20 MG capsule; Take 1 capsule (20 mg total) by mouth once daily.  -     pioglitazone (ACTOS) 30 MG tablet; Take 1 tablet (30 mg total) by mouth once daily.

## 2022-06-21 NOTE — PATIENT INSTRUCTIONS
Zbigniew was seen today for establish care and medication refill.    Diagnoses and all orders for this visit:    Cerebrovascular accident (CVA), unspecified mechanism  -     Basic Metabolic Panel; Future  -     CBC Auto Differential; Future  -     TSH; Future  -     Hepatic Function Panel; Future  -     Urinalysis, Reflex to Urine Culture; Future  -     Basic Metabolic Panel  -     CBC Auto Differential  -     TSH  -     Hepatic Function Panel  -     Urinalysis, Reflex to Urine Culture  -     Ambulatory referral/consult to Home Health; Future    Seizure  -     levETIRAcetam (KEPPRA) 500 MG Tab; Take 1 tablet (500 mg total) by mouth 2 (two) times daily.    Left hemiparesis  -     D-Dimer, Quantitative; Future  -     D-Dimer, Quantitative    Dizziness    Nonintractable headache, unspecified chronicity pattern, unspecified headache type    Leg edema, right  -     US Lower Extremity Veins Right; Future    Shortness of breath  -     X-Ray Chest PA And Lateral; Future    Chronic pain of both knees  -     X-Ray Knee 1 or 2 View Bilateral; Future    Other abnormal findings on diagnostic imaging of central nervous system   -     TSH; Future  -     TSH    Other orders  -     amLODIPine (NORVASC) 5 MG tablet; Take 1 tablet (5 mg total) by mouth once daily.  -     atorvastatin (LIPITOR) 20 MG tablet; Take 1 tablet (20 mg total) by mouth once daily.  -     FEROSUL 325 mg (65 mg iron) Tab tablet; TAKE ONE (1) TABLET BY MOUTH TWICE DAILY  -     meclizine (ANTIVERT) 25 MG tablet; TAKE ONE (1) TABLET BY MOUTH EVERY 12 HOURS AS NEEDED  -     naproxen (NAPROSYN) 375 MG tablet; TAKE ONE (1) TABLET (375 MG TOTAL) BY MOUTH  TWICE DAILY WITH FOOD AS NEEDED FOR KNEE PAIN  -     omeprazole (PRILOSEC) 20 MG capsule; Take 1 capsule (20 mg total) by mouth once daily.  -     pioglitazone (ACTOS) 30 MG tablet; Take 1 tablet (30 mg total) by mouth once daily.

## 2022-06-22 ENCOUNTER — TELEPHONE (OUTPATIENT)
Dept: FAMILY MEDICINE | Facility: CLINIC | Age: 82
End: 2022-06-22
Payer: MEDICARE

## 2022-06-22 NOTE — TELEPHONE ENCOUNTER
Called number [phone]. Spoke with daughter, Meka Brown. This patient speaks little English, her  Luisa Mendiola translates for her. Informed daughter of appointment for venous dopplers on the same day as Luisa 6-27-22 but at 3:30. Daughter voiced understanding.

## 2022-06-23 ENCOUNTER — TELEPHONE (OUTPATIENT)
Dept: FAMILY MEDICINE | Facility: CLINIC | Age: 82
End: 2022-06-23
Payer: MEDICARE

## 2022-06-23 NOTE — TELEPHONE ENCOUNTER
Pt. Have follow up appt. On 7/5/2022.    ----- Message from Farhad Culver MD sent at 6/21/2022  4:16 PM CDT -----  Need to see next week abnl  xray

## 2022-06-27 ENCOUNTER — TELEPHONE (OUTPATIENT)
Dept: FAMILY MEDICINE | Facility: CLINIC | Age: 82
End: 2022-06-27
Payer: MEDICARE

## 2022-06-27 NOTE — TELEPHONE ENCOUNTER
----- Message from Farhad Culver MD sent at 6/26/2022  1:29 PM CDT -----  Need to see in  1 week please  abnl results

## 2022-07-05 ENCOUNTER — OFFICE VISIT (OUTPATIENT)
Dept: FAMILY MEDICINE | Facility: CLINIC | Age: 82
End: 2022-07-05
Payer: MEDICARE

## 2022-07-05 ENCOUNTER — APPOINTMENT (OUTPATIENT)
Dept: RADIOLOGY | Facility: CLINIC | Age: 82
End: 2022-07-05
Attending: INTERNAL MEDICINE
Payer: MEDICARE

## 2022-07-05 VITALS
RESPIRATION RATE: 18 BRPM | SYSTOLIC BLOOD PRESSURE: 134 MMHG | DIASTOLIC BLOOD PRESSURE: 72 MMHG | OXYGEN SATURATION: 99 % | WEIGHT: 142.19 LBS | HEIGHT: 62 IN | TEMPERATURE: 97 F | BODY MASS INDEX: 26.17 KG/M2 | HEART RATE: 71 BPM

## 2022-07-05 DIAGNOSIS — M43.9 ACQUIRED DEFORMITY OF BACK OR SPINE: ICD-10-CM

## 2022-07-05 DIAGNOSIS — M17.11 PRIMARY OSTEOARTHRITIS OF RIGHT KNEE: ICD-10-CM

## 2022-07-05 DIAGNOSIS — M43.9 ACQUIRED DEFORMITY OF BACK OR SPINE: Primary | ICD-10-CM

## 2022-07-05 PROCEDURE — 99214 PR OFFICE/OUTPT VISIT, EST, LEVL IV, 30-39 MIN: ICD-10-PCS | Mod: ,,, | Performed by: INTERNAL MEDICINE

## 2022-07-05 PROCEDURE — 72070 X-RAY EXAM THORAC SPINE 2VWS: CPT | Mod: TC,RHCUB | Performed by: INTERNAL MEDICINE

## 2022-07-05 PROCEDURE — 99214 OFFICE O/P EST MOD 30 MIN: CPT | Mod: ,,, | Performed by: INTERNAL MEDICINE

## 2022-07-05 NOTE — PATIENT INSTRUCTIONS
Zbigniew was seen today for follow-up and hypertension.    Diagnoses and all orders for this visit:    Acquired deformity of back or spine  -     X-Ray Thoracic Spine AP Lateral; Future  -     Ambulatory referral/consult to [facility]; Future    Primary osteoarthritis of right knee  -     Ambulatory referral/consult to Orthopedics; Future

## 2022-07-05 NOTE — PROGRESS NOTES
Subjective:       Patient ID: Zbigniew Ventura is a 82 y.o. female.    Chief Complaint: Follow-up and Hypertension    Patient is here today for a follow up evaluation. Recent labs reviewed, results within normal range. Recent x-ray of knee reviewed, results show Arthritis. Will refer to Ortho. Recent back x-ray reviewed, results abnormal. Will order x-ray of t-spine. Will refer to . Will follow in 3 months.       Current Medications:    Current Outpatient Medications:     amLODIPine (NORVASC) 5 MG tablet, Take 1 tablet (5 mg total) by mouth once daily., Disp: 90 tablet, Rfl: 1    atorvastatin (LIPITOR) 20 MG tablet, Take 1 tablet (20 mg total) by mouth once daily., Disp: 90 tablet, Rfl: 1    FEROSUL 325 mg (65 mg iron) Tab tablet, TAKE ONE (1) TABLET BY MOUTH TWICE DAILY, Disp: 180 tablet, Rfl: 1    levETIRAcetam (KEPPRA) 500 MG Tab, Take 1 tablet (500 mg total) by mouth 2 (two) times daily., Disp: 180 tablet, Rfl: 1    meclizine (ANTIVERT) 25 MG tablet, TAKE ONE (1) TABLET BY MOUTH EVERY 12 HOURS AS NEEDED, Disp: 180 tablet, Rfl: 1    naproxen (NAPROSYN) 375 MG tablet, TAKE ONE (1) TABLET (375 MG TOTAL) BY MOUTH  TWICE DAILY WITH FOOD AS NEEDED FOR KNEE PAIN, Disp: 180 tablet, Rfl: 1    omeprazole (PRILOSEC) 20 MG capsule, Take 1 capsule (20 mg total) by mouth once daily., Disp: 90 capsule, Rfl: 1    pioglitazone (ACTOS) 30 MG tablet, Take 1 tablet (30 mg total) by mouth once daily., Disp: 90 tablet, Rfl: 1    Last Labs:     Office Visit on 06/21/2022   Component Date Value    Sodium 06/21/2022 132 (A)    Potassium 06/21/2022 4.3     Chloride 06/21/2022 100     CO2 06/21/2022 28     Anion Gap 06/21/2022 8     Glucose 06/21/2022 87     BUN 06/21/2022 13     Creatinine 06/21/2022 1.05 (A)    BUN/Creatinine Ratio 06/21/2022 12     Calcium 06/21/2022 8.9     eGFR 06/21/2022 54 (A)    TSH 06/21/2022 0.784     Total Protein 06/21/2022 7.9     Albumin 06/21/2022 4.0     Bilirubin, Total 06/21/2022  0.4     Bilirubin, Direct 06/21/2022 0.1     AST 06/21/2022 20     ALT 06/21/2022 23     Alk Phos 06/21/2022 68     D-Dimer 06/21/2022 0.75 (A)    Color, UA 06/21/2022 Yellow     Clarity, UA 06/21/2022 Clear     pH, UA 06/21/2022 6.0     Leukocytes, UA 06/21/2022 Negative     Nitrites, UA 06/21/2022 Negative     Protein, UA 06/21/2022 Negative     Glucose, UA 06/21/2022 Negative     Ketones, UA 06/21/2022 Negative     Urobilinogen, UA 06/21/2022 0.2     Bilirubin, UA 06/21/2022 Negative     Blood, UA 06/21/2022 Negative     Specific Gravity, UA 06/21/2022 <=1.005     WBC 06/21/2022 7.64     RBC 06/21/2022 3.36 (A)    Hemoglobin 06/21/2022 10.0 (A)    Hematocrit 06/21/2022 30.3 (A)    MCV 06/21/2022 90.2     MCH 06/21/2022 29.8     MCHC 06/21/2022 33.0     RDW 06/21/2022 13.1     Platelet Count 06/21/2022 281     MPV 06/21/2022 9.1 (A)    Neutrophils % 06/21/2022 77.7 (A)    Lymphocytes % 06/21/2022 13.5 (A)    Monocytes % 06/21/2022 6.3 (A)    Eosinophils % 06/21/2022 1.7     Basophils % 06/21/2022 0.5     Immature Granulocytes % 06/21/2022 0.3     nRBC, Auto 06/21/2022 0.0     Neutrophils, Abs 06/21/2022 5.94     Lymphocytes, Absolute 06/21/2022 1.03     Monocytes, Absolute 06/21/2022 0.48     Eosinophils, Absolute 06/21/2022 0.13     Basophils, Absolute 06/21/2022 0.04     Immature Granulocytes, A* 06/21/2022 0.02     nRBC, Absolute 06/21/2022 0.00     Diff Type 06/21/2022 Auto        Last Imaging:  X-Ray Thoracic Spine AP Lateral  Narrative: EXAMINATION:  XR THORACIC SPINE AP LATERAL    CLINICAL HISTORY:  Deforming dorsopathy, unspecified    TECHNIQUE:  AP and lateral views of the thoracic spine were performed.    COMPARISON:  None    FINDINGS:  There are degenerative endplate changes throughout the thoracic spine with osteophyte formation.  Facet degeneration seen throughout as well.  No acute abnormality detected.  Impression: Degenerative changes.    Electronically  signed by: Olivier Puri  Date:    07/05/2022  Time:    10:50         Review of Systems   All other systems reviewed and are negative.        Objective:      Physical Exam  Vitals reviewed.   Constitutional:       Appearance: Normal appearance. She is normal weight.   Cardiovascular:      Rate and Rhythm: Normal rate and regular rhythm.      Pulses: Normal pulses.      Heart sounds: Normal heart sounds.   Pulmonary:      Effort: Pulmonary effort is normal.      Breath sounds: Normal breath sounds.   Abdominal:      General: Abdomen is flat. Bowel sounds are normal.      Palpations: Abdomen is soft.   Musculoskeletal:         General: Normal range of motion.      Cervical back: Normal range of motion and neck supple.   Skin:     General: Skin is warm and dry.   Neurological:      General: No focal deficit present.      Mental Status: She is alert and oriented to person, place, and time. Mental status is at baseline.         Assessment:       1. Acquired deformity of back or spine  X-Ray Thoracic Spine AP Lateral    Ambulatory referral/consult to [facility]   2. Primary osteoarthritis of right knee  Ambulatory referral/consult to Orthopedics        Plan:         Zbigniew was seen today for follow-up and hypertension.    Diagnoses and all orders for this visit:    Acquired deformity of back or spine  -     X-Ray Thoracic Spine AP Lateral; Future  -     Ambulatory referral/consult to [facility]; Future    Primary osteoarthritis of right knee  -     Ambulatory referral/consult to Orthopedics; Future

## 2022-07-26 ENCOUNTER — OFFICE VISIT (OUTPATIENT)
Dept: NEUROLOGY | Facility: CLINIC | Age: 82
End: 2022-07-26
Payer: MEDICARE

## 2022-07-26 VITALS
OXYGEN SATURATION: 96 % | HEART RATE: 67 BPM | HEIGHT: 62 IN | BODY MASS INDEX: 26.13 KG/M2 | RESPIRATION RATE: 18 BRPM | DIASTOLIC BLOOD PRESSURE: 72 MMHG | SYSTOLIC BLOOD PRESSURE: 132 MMHG | WEIGHT: 142 LBS

## 2022-07-26 DIAGNOSIS — G81.94 LEFT HEMIPARESIS: Chronic | ICD-10-CM

## 2022-07-26 DIAGNOSIS — R42 DIZZINESS: ICD-10-CM

## 2022-07-26 DIAGNOSIS — I63.9 CEREBROVASCULAR ACCIDENT (CVA), UNSPECIFIED MECHANISM: Primary | Chronic | ICD-10-CM

## 2022-07-26 DIAGNOSIS — R56.9 SEIZURE: Chronic | ICD-10-CM

## 2022-07-26 PROCEDURE — 99214 PR OFFICE/OUTPT VISIT, EST, LEVL IV, 30-39 MIN: ICD-10-PCS | Mod: S$PBB,,, | Performed by: NURSE PRACTITIONER

## 2022-07-26 PROCEDURE — 99214 OFFICE O/P EST MOD 30 MIN: CPT | Mod: S$PBB,,, | Performed by: NURSE PRACTITIONER

## 2022-07-26 PROCEDURE — 99214 OFFICE O/P EST MOD 30 MIN: CPT | Mod: PBBFAC | Performed by: NURSE PRACTITIONER

## 2022-07-26 RX ORDER — LEVETIRACETAM 500 MG/1
500 TABLET ORAL 2 TIMES DAILY
Qty: 180 TABLET | Refills: 1 | Status: SHIPPED | OUTPATIENT
Start: 2022-07-26 | End: 2022-10-06 | Stop reason: SDUPTHER

## 2022-07-26 NOTE — PROGRESS NOTES
Subjective:       Patient ID: Zbigniew Ventura is a 82 y.o. female     Chief Complaint:    Chief Complaint   Patient presents with    Follow-up    Seizures        Allergies:  Patient has no known allergies.    Current Medications:    Outpatient Encounter Medications as of 7/26/2022   Medication Sig Dispense Refill    amLODIPine (NORVASC) 5 MG tablet Take 1 tablet (5 mg total) by mouth once daily. 90 tablet 1    atorvastatin (LIPITOR) 20 MG tablet Take 1 tablet (20 mg total) by mouth once daily. 90 tablet 1    FEROSUL 325 mg (65 mg iron) Tab tablet TAKE ONE (1) TABLET BY MOUTH TWICE DAILY 180 tablet 1    meclizine (ANTIVERT) 25 MG tablet TAKE ONE (1) TABLET BY MOUTH EVERY 12 HOURS AS NEEDED 180 tablet 1    naproxen (NAPROSYN) 375 MG tablet TAKE ONE (1) TABLET (375 MG TOTAL) BY MOUTH  TWICE DAILY WITH FOOD AS NEEDED FOR KNEE PAIN 180 tablet 1    omeprazole (PRILOSEC) 20 MG capsule Take 1 capsule (20 mg total) by mouth once daily. 90 capsule 1    pioglitazone (ACTOS) 30 MG tablet Take 1 tablet (30 mg total) by mouth once daily. 90 tablet 1    [DISCONTINUED] levETIRAcetam (KEPPRA) 500 MG Tab Take 1 tablet (500 mg total) by mouth 2 (two) times daily. 180 tablet 1    levETIRAcetam (KEPPRA) 500 MG Tab Take 1 tablet (500 mg total) by mouth 2 (two) times daily. 180 tablet 1     No facility-administered encounter medications on file as of 7/26/2022.       History of Present Illness  This Is a 81-year-old past history of diabetes and right hemispheric stroke and seizure.    Her family in room assists with communication, her .    They deny any seizures since her last visit with us and no new CVA symptoms.  She is on triple therapy including ASA 81mg as well as keppra 500mg BID which is filled by her primary care and denies any side effects.    She does have residual left-sided weakness from stroke but it is minimal. In     They do report that she has occasional episodes of dizziness, this since her prior CVA.   Her  has let her use his prescribed meclizine which worked very well and she inquires about her own prescription.         Review of Systems  Review of Systems   Constitutional: Negative for diaphoresis and fever.   HENT: Negative for congestion, hearing loss and tinnitus.    Eyes: Negative for blurred vision, double vision, photophobia, discharge and redness.   Respiratory: Negative for cough and shortness of breath.    Cardiovascular: Negative for chest pain.   Gastrointestinal: Negative for abdominal pain, nausea and vomiting.   Musculoskeletal: Positive for joint pain and myalgias. Negative for back pain, falls and neck pain.   Skin: Negative for itching and rash.   Neurological: Positive for dizziness and weakness. Negative for tremors, sensory change, speech change, focal weakness, seizures, loss of consciousness and headaches.   Psychiatric/Behavioral: Negative for depression, hallucinations and memory loss. The patient does not have insomnia.    All other systems reviewed and are negative.     Objective:     NEUROLOGICAL EXAMINATION:     MENTAL STATUS   Oriented to person, place, and time.   Attention: normal. Concentration: normal.   Speech: speech is normal   Level of consciousness: alert  Knowledge: good and consistent with education.   Normal comprehension.     CRANIAL NERVES     CN II   Visual fields full to confrontation.   Visual acuity: normal  Right visual field deficit: none  Left visual field deficit: none     CN III, IV, VI   Pupils are equal, round, and reactive to light.  Extraocular motions are normal.   Right pupil: Size: 3 mm. Shape: regular. Reactivity: brisk. Consensual response: intact. Accommodation: intact.   Left pupil: Size: 3 mm. Shape: regular. Reactivity: brisk. Consensual response: intact. Accommodation: intact.   CN III: no CN III palsy  CN VI: no CN VI palsy  Nystagmus: none   Diplopia: none  Upgaze: normal  Downgaze: normal  Conjugate gaze: present  Vestibulo-ocular  reflex: present    CN V   Facial sensation intact.   Right facial sensation deficit: none  Left facial sensation deficit: none  Right corneal reflex: normal  Left corneal reflex: normal    CN VII   Facial expression full, symmetric.   Right facial weakness: none  Left facial weakness: none  Right taste: normal  Left taste: normal    CN VIII   CN VIII normal.   Hearing: intact    CN IX, X   CN IX normal.   CN X normal.   Palate: symmetric    CN XI   CN XI normal.   Right sternocleidomastoid strength: normal  Left sternocleidomastoid strength: normal  Right trapezius strength: normal  Left trapezius strength: normal    CN XII   CN XII normal.   Tongue: not atrophic  Fasciculations: absent  Tongue deviation: none    MOTOR EXAM   Muscle bulk: normal  Overall muscle tone: normal  Right arm tone: normal  Left arm tone: normal  Right arm pronator drift: absent  Left arm pronator drift: absent  Right leg tone: normal  Left leg tone: normal    Strength   Right neck flexion: 5/5  Left neck flexion: 5/5  Right neck extension: 5/5  Left neck extension: 5/5  Right deltoid: 5/5  Left deltoid: 4/5  Right biceps: 5/5  Left biceps: 4/5  Right triceps: 5/5  Left triceps: 4/5  Right wrist flexion: 5/5  Left wrist flexion: 4/5  Right wrist extension: 5/5  Left wrist extension: 4/5  Right interossei: 5/5  Left interossei: 5/5  Right iliopsoas: 5/5  Left iliopsoas: 5/5  Right quadriceps: 5/5  Left quadriceps: 5/5  Right hamstrin/5  Left hamstrin/5  Right anterior tibial: 5/5  Left anterior tibial: 5/5  Right posterior tibial: 5/5  Left posterior tibial: 5/5  Right gastroc: 5/5  Left gastroc: 5/5    REFLEXES     Reflexes   Right brachioradialis: 2+  Left brachioradialis: 2+  Right biceps: 2+  Left biceps: 2+  Right triceps: 2+  Left triceps: 2+  Right patellar: 2+  Left patellar: 2+  Right achilles: 2+  Left achilles: 2+  Right plantar: normal  Left plantar: normal  Right Calvin: absent  Left Calvin: absent  Right ankle clonus:  absent  Left ankle clonus: absent  Right pendular knee jerk: absent  Left pendular knee jerk: absent    SENSORY EXAM   Light touch normal.   Right arm light touch: normal  Left arm light touch: normal  Right leg light touch: normal  Left leg light touch: normal  Vibration normal.   Right arm vibration: normal  Left arm vibration: normal  Right leg vibration: normal  Left leg vibration: normal  Proprioception normal.   Right arm proprioception: normal  Left arm proprioception: normal  Right leg proprioception: normal  Left leg proprioception: normal  Pinprick normal.   Right arm pinprick: normal  Left arm pinprick: normal  Right leg pinprick: normal  Left leg pinprick: normal  Graphesthesia: normal  Romberg: negative  Stereognosis: normal    GAIT AND COORDINATION     Gait  Gait: normal     Coordination   Finger to nose coordination: normal  Heel to shin coordination: normal  Tandem walking coordination: normal    Tremor   Resting tremor: absent  Intention tremor: absent  Action tremor: absent       Physical Exam  Vitals and nursing note reviewed.   Constitutional:       Appearance: Normal appearance.   HENT:      Head: Normocephalic.   Eyes:      Extraocular Movements: Extraocular movements intact and EOM normal.      Pupils: Pupils are equal, round, and reactive to light.   Cardiovascular:      Rate and Rhythm: Normal rate and regular rhythm.   Pulmonary:      Effort: Pulmonary effort is normal.      Breath sounds: Normal breath sounds.   Musculoskeletal:         General: No swelling or tenderness. Normal range of motion.      Cervical back: Normal range of motion and neck supple.      Right lower leg: No edema.      Left lower leg: No edema.   Skin:     General: Skin is warm and dry.      Coloration: Skin is not jaundiced.      Findings: No rash.   Neurological:      General: No focal deficit present.      Mental Status: She is alert and oriented to person, place, and time.      GCS: GCS eye subscore is 4. GCS  verbal subscore is 5. GCS motor subscore is 6.      Cranial Nerves: No cranial nerve deficit.      Sensory: No sensory deficit.      Motor: Motor function is intact. No weakness.      Coordination: Coordination is intact. Coordination normal. Finger-Nose-Finger Test, Heel to Shin Test and Romberg Test normal.      Gait: Gait is intact. Gait and tandem walk normal.      Deep Tendon Reflexes: Reflexes normal.      Reflex Scores:       Tricep reflexes are 2+ on the right side and 2+ on the left side.       Bicep reflexes are 2+ on the right side and 2+ on the left side.       Brachioradialis reflexes are 2+ on the right side and 2+ on the left side.       Patellar reflexes are 2+ on the right side and 2+ on the left side.       Achilles reflexes are 2+ on the right side and 2+ on the left side.  Psychiatric:         Mood and Affect: Mood normal.         Speech: Speech normal.         Behavior: Behavior normal.          Assessment:     Problem List Items Addressed This Visit        Neuro    Seizure (Chronic)    Relevant Medications    levETIRAcetam (KEPPRA) 500 MG Tab    Cerebrovascular accident (CVA) - Primary (Chronic)    Left hemiparesis (Chronic)       Other    Dizziness           Primary Diagnosis and ICD10  Cerebrovascular accident (CVA), unspecified mechanism [I63.9]    Plan:     Patient Instructions   1.  Continue current medication including keppra as directed    2. Notify clinic if any seizures    3. Antivert as needed for dizziness    4. Naproxen as directed as needed for lower back pain    Seizure Precautions:  1. Take medications as directed  2. Avoid open flames and hot surfaces such as fires and grills  3. Avoid elevations such as ladders or stools  4. Avoid swimming alone  5. Make sure getting plenty of sleep, recommend 7-8 hours per day  6. Avoid alcohol and illicit drugs  7. No driving or operating heavy machinery for 6 months after last known seizure    Stroke risk modifiers:    1. Good sleep habits,  recommend at least 7 hours total sleep daily  2. Continue management of blood pressure and cholesterol including medications, exercise, and good eating habits  3. Exercise as much as possible, recommend 5 days of the week for 30 minutes each day  4. Avoid smoking and alcohol  5. Go to the nearest emergency department immediately if any new or worsening weakness, speech difficulty, or numbness      Medications Discontinued During This Encounter   Medication Reason    levETIRAcetam (KEPPRA) 500 MG Tab Reorder       Requested Prescriptions     Signed Prescriptions Disp Refills    levETIRAcetam (KEPPRA) 500 MG Tab 180 tablet 1     Sig: Take 1 tablet (500 mg total) by mouth 2 (two) times daily.       No orders of the defined types were placed in this encounter.

## 2022-10-06 ENCOUNTER — HOSPITAL ENCOUNTER (OUTPATIENT)
Facility: HOSPITAL | Age: 82
Discharge: HOME | End: 2022-10-07
Attending: STUDENT IN AN ORGANIZED HEALTH CARE EDUCATION/TRAINING PROGRAM | Admitting: STUDENT IN AN ORGANIZED HEALTH CARE EDUCATION/TRAINING PROGRAM
Payer: MEDICARE

## 2022-10-06 ENCOUNTER — OFFICE VISIT (OUTPATIENT)
Dept: FAMILY MEDICINE | Facility: CLINIC | Age: 82
End: 2022-10-06
Payer: MEDICARE

## 2022-10-06 VITALS
RESPIRATION RATE: 18 BRPM | DIASTOLIC BLOOD PRESSURE: 66 MMHG | WEIGHT: 138.38 LBS | SYSTOLIC BLOOD PRESSURE: 118 MMHG | TEMPERATURE: 98 F | BODY MASS INDEX: 25.47 KG/M2 | HEIGHT: 62 IN | HEART RATE: 100 BPM

## 2022-10-06 DIAGNOSIS — R00.0 TACHYCARDIA: Primary | ICD-10-CM

## 2022-10-06 DIAGNOSIS — I48.91 ATRIAL FIBRILLATION: ICD-10-CM

## 2022-10-06 DIAGNOSIS — E11.69 TYPE 2 DIABETES MELLITUS WITH OTHER SPECIFIED COMPLICATION, WITHOUT LONG-TERM CURRENT USE OF INSULIN: ICD-10-CM

## 2022-10-06 DIAGNOSIS — I10 PRIMARY HYPERTENSION: ICD-10-CM

## 2022-10-06 DIAGNOSIS — E78.5 HYPERLIPIDEMIA, UNSPECIFIED HYPERLIPIDEMIA TYPE: ICD-10-CM

## 2022-10-06 DIAGNOSIS — R00.1 BRADYCARDIA: ICD-10-CM

## 2022-10-06 DIAGNOSIS — D64.9 SEVERE ANEMIA: ICD-10-CM

## 2022-10-06 DIAGNOSIS — I63.9 CEREBROVASCULAR ACCIDENT (CVA), UNSPECIFIED MECHANISM: Chronic | ICD-10-CM

## 2022-10-06 DIAGNOSIS — R79.89 ELEVATED BRAIN NATRIURETIC PEPTIDE (BNP) LEVEL: ICD-10-CM

## 2022-10-06 DIAGNOSIS — R56.9 SEIZURE: Chronic | ICD-10-CM

## 2022-10-06 DIAGNOSIS — G81.94 LEFT HEMIPARESIS: Chronic | ICD-10-CM

## 2022-10-06 DIAGNOSIS — R10.84 GENERALIZED ABDOMINAL PAIN: ICD-10-CM

## 2022-10-06 DIAGNOSIS — R06.02 SHORTNESS OF BREATH: ICD-10-CM

## 2022-10-06 DIAGNOSIS — R00.0 TACHYCARDIA: ICD-10-CM

## 2022-10-06 DIAGNOSIS — M47.894 OTHER OSTEOARTHRITIS OF SPINE, THORACIC REGION: ICD-10-CM

## 2022-10-06 DIAGNOSIS — R00.2 PALPITATION: ICD-10-CM

## 2022-10-06 DIAGNOSIS — R42 DIZZINESS: ICD-10-CM

## 2022-10-06 DIAGNOSIS — R07.9 CHEST PAIN: ICD-10-CM

## 2022-10-06 DIAGNOSIS — I48.92 ATRIAL FLUTTER WITH RAPID VENTRICULAR RESPONSE: Primary | ICD-10-CM

## 2022-10-06 DIAGNOSIS — I48.91 ATRIAL FIBRILLATION WITH RVR: ICD-10-CM

## 2022-10-06 PROBLEM — E11.9 TYPE 2 DIABETES MELLITUS, WITHOUT LONG-TERM CURRENT USE OF INSULIN: Status: ACTIVE | Noted: 2022-10-06

## 2022-10-06 LAB
ABORH RETYPE: NORMAL
ALBUMIN SERPL BCP-MCNC: 4.3 G/DL (ref 3.5–5)
ALBUMIN/GLOB SERPL: 1.2 {RATIO}
ALP SERPL-CCNC: 76 U/L (ref 55–142)
ALT SERPL W P-5'-P-CCNC: 25 U/L (ref 13–56)
ANION GAP SERPL CALCULATED.3IONS-SCNC: 15 MMOL/L (ref 7–16)
AST SERPL W P-5'-P-CCNC: 27 U/L (ref 15–37)
BASOPHILS # BLD AUTO: 0.04 K/UL (ref 0–0.2)
BASOPHILS NFR BLD AUTO: 0.7 % (ref 0–1)
BILIRUB SERPL-MCNC: 0.4 MG/DL (ref ?–1.2)
BUN SERPL-MCNC: 17 MG/DL (ref 7–18)
BUN/CREAT SERPL: 14 (ref 6–20)
CALCIUM SERPL-MCNC: 9 MG/DL (ref 8.5–10.1)
CHLORIDE SERPL-SCNC: 96 MMOL/L (ref 98–107)
CO2 SERPL-SCNC: 24 MMOL/L (ref 21–32)
CREAT SERPL-MCNC: 1.25 MG/DL (ref 0.55–1.02)
DIFFERENTIAL METHOD BLD: ABNORMAL
EGFR (NO RACE VARIABLE) (RUSH/TITUS): 44 ML/MIN/1.73M²
EKG 12-LEAD: ABNORMAL
EOSINOPHIL # BLD AUTO: 0.12 K/UL (ref 0–0.5)
EOSINOPHIL NFR BLD AUTO: 2 % (ref 1–4)
ERYTHROCYTE [DISTWIDTH] IN BLOOD BY AUTOMATED COUNT: 12.8 % (ref 11.5–14.5)
FLUAV AG UPPER RESP QL IA.RAPID: NEGATIVE
FLUBV AG UPPER RESP QL IA.RAPID: NEGATIVE
GLOBULIN SER-MCNC: 3.5 G/DL (ref 2–4)
GLUCOSE SERPL-MCNC: 105 MG/DL (ref 74–106)
HCT VFR BLD AUTO: 23.4 % (ref 38–47)
HEART RATE: 136
HGB BLD-MCNC: 7.7 G/DL (ref 12–16)
IMM GRANULOCYTES # BLD AUTO: 0.03 K/UL (ref 0–0.04)
IMM GRANULOCYTES NFR BLD: 0.5 % (ref 0–0.4)
INDIRECT COOMBS: NORMAL
LYMPHOCYTES # BLD AUTO: 1.54 K/UL (ref 1–4.8)
LYMPHOCYTES NFR BLD AUTO: 25.6 % (ref 27–41)
Lab: ABNORMAL
MCH RBC QN AUTO: 30.8 PG (ref 27–31)
MCHC RBC AUTO-ENTMCNC: 32.9 G/DL (ref 32–36)
MCV RBC AUTO: 93.6 FL (ref 80–96)
MONOCYTES # BLD AUTO: 0.29 K/UL (ref 0–0.8)
MONOCYTES NFR BLD AUTO: 4.8 % (ref 2–6)
MPC BLD CALC-MCNC: 8.4 FL (ref 9.4–12.4)
NEUTROPHILS # BLD AUTO: 4 K/UL (ref 1.8–7.7)
NEUTROPHILS NFR BLD AUTO: 66.4 % (ref 53–65)
NRBC # BLD AUTO: 0 X10E3/UL
NRBC, AUTO (.00): 0 %
NT-PROBNP SERPL-MCNC: 4032 PG/ML (ref 1–450)
PLATELET # BLD AUTO: 293 K/UL (ref 150–400)
POTASSIUM SERPL-SCNC: 4.1 MMOL/L (ref 3.5–5.1)
PR INTERVAL: 159
PROT SERPL-MCNC: 7.8 G/DL (ref 6.4–8.2)
PRT AXES: ABNORMAL
QRS DURATION: 73
QT/QTC: ABNORMAL
RBC # BLD AUTO: 2.5 M/UL (ref 4.2–5.4)
RH BLD: NORMAL
SARS-COV+SARS-COV-2 AG RESP QL IA.RAPID: NEGATIVE
SODIUM SERPL-SCNC: 131 MMOL/L (ref 136–145)
TROPONIN I SERPL HS-MCNC: 12.3 PG/ML
VENTRICULAR RATE: ABNORMAL
WBC # BLD AUTO: 6.02 K/UL (ref 4.5–11)

## 2022-10-06 PROCEDURE — 80053 COMPREHEN METABOLIC PANEL: CPT | Performed by: NURSE PRACTITIONER

## 2022-10-06 PROCEDURE — 93005 ELECTROCARDIOGRAM TRACING: CPT

## 2022-10-06 PROCEDURE — 83880 ASSAY OF NATRIURETIC PEPTIDE: CPT | Performed by: NURSE PRACTITIONER

## 2022-10-06 PROCEDURE — 99285 EMERGENCY DEPT VISIT HI MDM: CPT | Mod: 25

## 2022-10-06 PROCEDURE — 86901 BLOOD TYPING SEROLOGIC RH(D): CPT | Performed by: NURSE PRACTITIONER

## 2022-10-06 PROCEDURE — 36415 COLL VENOUS BLD VENIPUNCTURE: CPT | Performed by: NURSE PRACTITIONER

## 2022-10-06 PROCEDURE — 84484 ASSAY OF TROPONIN QUANT: CPT | Performed by: NURSE PRACTITIONER

## 2022-10-06 PROCEDURE — 93010 EKG 12-LEAD: ICD-10-PCS | Mod: ,,, | Performed by: HOSPITALIST

## 2022-10-06 PROCEDURE — G0008 ADMIN INFLUENZA VIRUS VAC: HCPCS | Mod: ,,, | Performed by: INTERNAL MEDICINE

## 2022-10-06 PROCEDURE — 90694 FLU VACCINE - QUADRIVALENT - ADJUVANTED: ICD-10-PCS | Mod: ,,, | Performed by: INTERNAL MEDICINE

## 2022-10-06 PROCEDURE — 93005 ELECTROCARDIOGRAM TRACING: CPT | Mod: RHCUB | Performed by: INTERNAL MEDICINE

## 2022-10-06 PROCEDURE — 99220 PR INITIAL OBSERVATION CARE,LEVL III: CPT | Mod: ,,, | Performed by: HOSPITALIST

## 2022-10-06 PROCEDURE — 99284 PR EMERGENCY DEPT VISIT,LEVEL IV: ICD-10-PCS | Mod: CS,,, | Performed by: NURSE PRACTITIONER

## 2022-10-06 PROCEDURE — 93010 PR ELECTROCARDIOGRAM REPORT: ICD-10-PCS | Mod: ,,, | Performed by: INTERNAL MEDICINE

## 2022-10-06 PROCEDURE — 25000003 PHARM REV CODE 250: Performed by: NURSE PRACTITIONER

## 2022-10-06 PROCEDURE — 99220 PR INITIAL OBSERVATION CARE,LEVL III: ICD-10-PCS | Mod: ,,, | Performed by: HOSPITALIST

## 2022-10-06 PROCEDURE — 86923 COMPATIBILITY TEST ELECTRIC: CPT | Performed by: NURSE PRACTITIONER

## 2022-10-06 PROCEDURE — 82962 GLUCOSE BLOOD TEST: CPT

## 2022-10-06 PROCEDURE — 93010 ELECTROCARDIOGRAM REPORT: CPT | Mod: ,,, | Performed by: HOSPITALIST

## 2022-10-06 PROCEDURE — 93010 ELECTROCARDIOGRAM REPORT: CPT | Mod: ,,, | Performed by: INTERNAL MEDICINE

## 2022-10-06 PROCEDURE — 96376 TX/PRO/DX INJ SAME DRUG ADON: CPT

## 2022-10-06 PROCEDURE — 85025 COMPLETE CBC W/AUTO DIFF WBC: CPT | Performed by: NURSE PRACTITIONER

## 2022-10-06 PROCEDURE — G0378 HOSPITAL OBSERVATION PER HR: HCPCS

## 2022-10-06 PROCEDURE — 25000003 PHARM REV CODE 250: Performed by: HOSPITALIST

## 2022-10-06 PROCEDURE — 96375 TX/PRO/DX INJ NEW DRUG ADDON: CPT

## 2022-10-06 PROCEDURE — 90694 VACC AIIV4 NO PRSRV 0.5ML IM: CPT | Mod: ,,, | Performed by: INTERNAL MEDICINE

## 2022-10-06 PROCEDURE — 63600175 PHARM REV CODE 636 W HCPCS: Performed by: HOSPITALIST

## 2022-10-06 PROCEDURE — 93010 EKG 12-LEAD: ICD-10-PCS | Mod: ,,, | Performed by: INTERNAL MEDICINE

## 2022-10-06 PROCEDURE — 99284 EMERGENCY DEPT VISIT MOD MDM: CPT | Mod: CS,,, | Performed by: NURSE PRACTITIONER

## 2022-10-06 PROCEDURE — 96372 THER/PROPH/DIAG INJ SC/IM: CPT

## 2022-10-06 PROCEDURE — 99214 OFFICE O/P EST MOD 30 MIN: CPT | Mod: ,,, | Performed by: INTERNAL MEDICINE

## 2022-10-06 PROCEDURE — 87428 SARSCOV & INF VIR A&B AG IA: CPT | Performed by: NURSE PRACTITIONER

## 2022-10-06 PROCEDURE — 99214 PR OFFICE/OUTPT VISIT, EST, LEVL IV, 30-39 MIN: ICD-10-PCS | Mod: ,,, | Performed by: INTERNAL MEDICINE

## 2022-10-06 PROCEDURE — G0008 FLU VACCINE - QUADRIVALENT - ADJUVANTED: ICD-10-PCS | Mod: ,,, | Performed by: INTERNAL MEDICINE

## 2022-10-06 RX ORDER — ONDANSETRON 4 MG/1
8 TABLET, ORALLY DISINTEGRATING ORAL EVERY 8 HOURS PRN
Status: DISCONTINUED | OUTPATIENT
Start: 2022-10-06 | End: 2022-10-07 | Stop reason: HOSPADM

## 2022-10-06 RX ORDER — OMEPRAZOLE 20 MG/1
20 CAPSULE, DELAYED RELEASE ORAL DAILY
Qty: 90 CAPSULE | Refills: 1 | Status: SHIPPED | OUTPATIENT
Start: 2022-10-06 | End: 2023-04-19 | Stop reason: SDUPTHER

## 2022-10-06 RX ORDER — AMLODIPINE BESYLATE 5 MG/1
5 TABLET ORAL DAILY
Qty: 90 TABLET | Refills: 1 | Status: SHIPPED | OUTPATIENT
Start: 2022-10-06 | End: 2023-04-19 | Stop reason: SDUPTHER

## 2022-10-06 RX ORDER — NAPROXEN 375 MG/1
TABLET ORAL
Qty: 180 TABLET | Refills: 1 | Status: SHIPPED | OUTPATIENT
Start: 2022-10-06 | End: 2023-01-30

## 2022-10-06 RX ORDER — SODIUM,POTASSIUM PHOSPHATES 280-250MG
2 POWDER IN PACKET (EA) ORAL
Status: DISCONTINUED | OUTPATIENT
Start: 2022-10-06 | End: 2022-10-07 | Stop reason: HOSPADM

## 2022-10-06 RX ORDER — ACETAMINOPHEN 500 MG
1000 TABLET ORAL EVERY 8 HOURS PRN
Status: DISCONTINUED | OUTPATIENT
Start: 2022-10-06 | End: 2022-10-07 | Stop reason: HOSPADM

## 2022-10-06 RX ORDER — LEVETIRACETAM 500 MG/1
500 TABLET ORAL 2 TIMES DAILY
Status: DISCONTINUED | OUTPATIENT
Start: 2022-10-06 | End: 2022-10-07 | Stop reason: HOSPADM

## 2022-10-06 RX ORDER — METOPROLOL TARTRATE 1 MG/ML
5 INJECTION, SOLUTION INTRAVENOUS
Status: COMPLETED | OUTPATIENT
Start: 2022-10-06 | End: 2022-10-06

## 2022-10-06 RX ORDER — HYDROCODONE BITARTRATE AND ACETAMINOPHEN 500; 5 MG/1; MG/1
TABLET ORAL
Status: DISCONTINUED | OUTPATIENT
Start: 2022-10-06 | End: 2022-10-06

## 2022-10-06 RX ORDER — LEVETIRACETAM 500 MG/1
500 TABLET ORAL 2 TIMES DAILY
Qty: 180 TABLET | Refills: 1 | Status: SHIPPED | OUTPATIENT
Start: 2022-10-06 | End: 2022-10-27 | Stop reason: SDUPTHER

## 2022-10-06 RX ORDER — ACETAMINOPHEN 325 MG/1
650 TABLET ORAL EVERY 4 HOURS PRN
Status: DISCONTINUED | OUTPATIENT
Start: 2022-10-06 | End: 2022-10-07 | Stop reason: HOSPADM

## 2022-10-06 RX ORDER — PIOGLITAZONEHYDROCHLORIDE 30 MG/1
30 TABLET ORAL DAILY
Qty: 90 TABLET | Refills: 1 | Status: SHIPPED | OUTPATIENT
Start: 2022-10-06 | End: 2023-04-19 | Stop reason: SDUPTHER

## 2022-10-06 RX ORDER — ATORVASTATIN CALCIUM 20 MG/1
20 TABLET, FILM COATED ORAL DAILY
Status: DISCONTINUED | OUTPATIENT
Start: 2022-10-07 | End: 2022-10-07 | Stop reason: HOSPADM

## 2022-10-06 RX ORDER — PANTOPRAZOLE SODIUM 40 MG/1
40 TABLET, DELAYED RELEASE ORAL DAILY
Status: DISCONTINUED | OUTPATIENT
Start: 2022-10-07 | End: 2022-10-07 | Stop reason: HOSPADM

## 2022-10-06 RX ORDER — ACETAMINOPHEN 325 MG/1
650 TABLET ORAL EVERY 8 HOURS PRN
Status: DISCONTINUED | OUTPATIENT
Start: 2022-10-06 | End: 2022-10-07 | Stop reason: HOSPADM

## 2022-10-06 RX ORDER — LANOLIN ALCOHOL/MO/W.PET/CERES
800 CREAM (GRAM) TOPICAL
Status: DISCONTINUED | OUTPATIENT
Start: 2022-10-06 | End: 2022-10-07 | Stop reason: HOSPADM

## 2022-10-06 RX ORDER — NALOXONE HCL 0.4 MG/ML
0.02 VIAL (ML) INJECTION
Status: DISCONTINUED | OUTPATIENT
Start: 2022-10-06 | End: 2022-10-07 | Stop reason: HOSPADM

## 2022-10-06 RX ORDER — MECLIZINE HCL 25MG 25 MG/1
TABLET, CHEWABLE ORAL
Qty: 180 TABLET | Refills: 1 | Status: SHIPPED | OUTPATIENT
Start: 2022-10-06 | End: 2023-01-30 | Stop reason: SDUPTHER

## 2022-10-06 RX ORDER — MAG HYDROX/ALUMINUM HYD/SIMETH 200-200-20
30 SUSPENSION, ORAL (FINAL DOSE FORM) ORAL 4 TIMES DAILY PRN
Status: DISCONTINUED | OUTPATIENT
Start: 2022-10-06 | End: 2022-10-07 | Stop reason: HOSPADM

## 2022-10-06 RX ORDER — METOPROLOL TARTRATE 25 MG/1
50 TABLET, FILM COATED ORAL
Status: COMPLETED | OUTPATIENT
Start: 2022-10-06 | End: 2022-10-06

## 2022-10-06 RX ORDER — POLYETHYLENE GLYCOL 3350 17 G/17G
17 POWDER, FOR SOLUTION ORAL DAILY PRN
Status: DISCONTINUED | OUTPATIENT
Start: 2022-10-06 | End: 2022-10-07 | Stop reason: HOSPADM

## 2022-10-06 RX ORDER — INSULIN ASPART 100 [IU]/ML
0-5 INJECTION, SOLUTION INTRAVENOUS; SUBCUTANEOUS
Status: DISCONTINUED | OUTPATIENT
Start: 2022-10-06 | End: 2022-10-07 | Stop reason: HOSPADM

## 2022-10-06 RX ORDER — DILTIAZEM HYDROCHLORIDE 5 MG/ML
10 INJECTION INTRAVENOUS ONCE
Status: DISCONTINUED | OUTPATIENT
Start: 2022-10-06 | End: 2022-10-07 | Stop reason: HOSPADM

## 2022-10-06 RX ORDER — ENOXAPARIN SODIUM 100 MG/ML
40 INJECTION SUBCUTANEOUS EVERY 24 HOURS
Status: DISCONTINUED | OUTPATIENT
Start: 2022-10-06 | End: 2022-10-07

## 2022-10-06 RX ORDER — GLUCAGON 1 MG
1 KIT INJECTION
Status: DISCONTINUED | OUTPATIENT
Start: 2022-10-06 | End: 2022-10-07 | Stop reason: HOSPADM

## 2022-10-06 RX ORDER — TALC
6 POWDER (GRAM) TOPICAL NIGHTLY PRN
Status: DISCONTINUED | OUTPATIENT
Start: 2022-10-06 | End: 2022-10-07 | Stop reason: HOSPADM

## 2022-10-06 RX ORDER — FERROUS SULFATE TAB 325 MG (65 MG ELEMENTAL FE) 325 (65 FE) MG
TAB ORAL
Qty: 180 TABLET | Refills: 1 | Status: SHIPPED | OUTPATIENT
Start: 2022-10-06 | End: 2023-09-14

## 2022-10-06 RX ORDER — SODIUM CHLORIDE 0.9 % (FLUSH) 0.9 %
10 SYRINGE (ML) INJECTION
Status: DISCONTINUED | OUTPATIENT
Start: 2022-10-06 | End: 2022-10-07 | Stop reason: HOSPADM

## 2022-10-06 RX ORDER — ATORVASTATIN CALCIUM 20 MG/1
20 TABLET, FILM COATED ORAL DAILY
Qty: 90 TABLET | Refills: 1 | Status: SHIPPED | OUTPATIENT
Start: 2022-10-06 | End: 2023-04-19 | Stop reason: SDUPTHER

## 2022-10-06 RX ADMIN — METOPROLOL TARTRATE 50 MG: 25 TABLET, FILM COATED ORAL at 04:10

## 2022-10-06 RX ADMIN — LEVETIRACETAM 500 MG: 500 TABLET ORAL at 09:10

## 2022-10-06 RX ADMIN — METOPROLOL TARTRATE 5 MG: 5 INJECTION, SOLUTION INTRAVENOUS at 12:10

## 2022-10-06 RX ADMIN — ENOXAPARIN SODIUM 40 MG: 40 INJECTION SUBCUTANEOUS at 06:10

## 2022-10-06 RX ADMIN — METOPROLOL TARTRATE 5 MG: 5 INJECTION, SOLUTION INTRAVENOUS at 02:10

## 2022-10-06 NOTE — ASSESSMENT & PLAN NOTE
Patient with Paroxysmal (<7 days) atrial fibrillation which is uncontrolled currently with Calcium Channel Blocker. Patient is currently in atrial fibrillation.[id number] Score: 7. HASBLED Score: 4. Anticoagulation not indicated due to severe anemia and epigastric pain. Will give DVT prophylaxis.       F/u echo   F/u TSH

## 2022-10-06 NOTE — HPI
83yo woman history of DM2, CVA (2 years ago), seizures, HTN, HLD, dizziness who presents from her PCP with tachycardia.  She was found to have atrial fibrillation.  She is here with her  Tristan Arreaga.  They state that she has had intermittent palpitations.  There is no history of atrial fibrillation and she is not currently on anticoagulation.  She take daily naproxen for joint pain.  She and her  have no other concerns at this time.      In the emergency department she was given 5 mg IV metoprolol with improvement in her heart rate.  However heart rate returned to 130 beats per minute and she was given 25 mg of Lopressor.  Hospital Medicine was requested to admit for atrial fibrillation with RVR.

## 2022-10-06 NOTE — PROGRESS NOTES
Late note  10/6/2022 11:45    Pt referred to ED at 1135 for palpations and tachycardia. Pt heart rate between 130-140 while in clinic today. EKG was performed to verify. Pt being transported to ED via pov driven by . Pt is asymptomatic at this time and shows no signs of distress. EKG has been faxed to [facility] and report called and given to [facility]

## 2022-10-06 NOTE — PROVIDER PROGRESS NOTES - EMERGENCY DEPT.
Encounter Date: 10/6/2022    ED Physician Progress Notes        Physician Note:   HR still poorly controlled. Dr. Starkey agrees to admit to observation.

## 2022-10-06 NOTE — MED STUDENT ASSESSMENT & PLAN
Atrial fibrillation with RVR    Seizure    History of CVA    Hypertension    Osteoarthritis    Hylerlipidemia

## 2022-10-06 NOTE — SUBJECTIVE & OBJECTIVE
Past Medical History:   Diagnosis Date    CVA (cerebral vascular accident)     Diabetes mellitus     Epilepsy, unspecified, not intractable, without status epilepticus     Hyperlipidemia     Hypertension        History reviewed. No pertinent surgical history.    Review of patient's allergies indicates:  No Known Allergies    No current facility-administered medications on file prior to encounter.     Current Outpatient Medications on File Prior to Encounter   Medication Sig    amLODIPine (NORVASC) 5 MG tablet Take 1 tablet (5 mg total) by mouth once daily.    atorvastatin (LIPITOR) 20 MG tablet Take 1 tablet (20 mg total) by mouth once daily.    FEROSUL 325 mg (65 mg iron) Tab tablet TAKE ONE (1) TABLET BY MOUTH TWICE DAILY    levETIRAcetam (KEPPRA) 500 MG Tab Take 1 tablet (500 mg total) by mouth 2 (two) times daily.    meclizine (ANTIVERT) 25 MG tablet TAKE ONE (1) TABLET BY MOUTH EVERY 12 HOURS AS NEEDED    naproxen (NAPROSYN) 375 MG tablet TAKE ONE (1) TABLET (375 MG TOTAL) BY MOUTH  TWICE DAILY WITH FOOD AS NEEDED FOR KNEE PAIN    omeprazole (PRILOSEC) 20 MG capsule Take 1 capsule (20 mg total) by mouth once daily.    pioglitazone (ACTOS) 30 MG tablet Take 1 tablet (30 mg total) by mouth once daily.    [DISCONTINUED] amLODIPine (NORVASC) 5 MG tablet Take 1 tablet (5 mg total) by mouth once daily.    [DISCONTINUED] atorvastatin (LIPITOR) 20 MG tablet Take 1 tablet (20 mg total) by mouth once daily.    [DISCONTINUED] FEROSUL 325 mg (65 mg iron) Tab tablet TAKE ONE (1) TABLET BY MOUTH TWICE DAILY    [DISCONTINUED] levETIRAcetam (KEPPRA) 500 MG Tab Take 1 tablet (500 mg total) by mouth 2 (two) times daily.    [DISCONTINUED] meclizine (ANTIVERT) 25 MG tablet TAKE ONE (1) TABLET BY MOUTH EVERY 12 HOURS AS NEEDED    [DISCONTINUED] naproxen (NAPROSYN) 375 MG tablet TAKE ONE (1) TABLET (375 MG TOTAL) BY MOUTH  TWICE DAILY WITH FOOD AS NEEDED FOR KNEE PAIN    [DISCONTINUED] omeprazole (PRILOSEC) 20 MG capsule Take 1  capsule (20 mg total) by mouth once daily.    [DISCONTINUED] pioglitazone (ACTOS) 30 MG tablet Take 1 tablet (30 mg total) by mouth once daily.     Family History    None       Tobacco Use    Smoking status: Never    Smokeless tobacco: Never   Substance and Sexual Activity    Alcohol use: Never    Drug use: Never    Sexual activity: Not on file     Review of Systems   Constitutional:  Negative for activity change, chills, fatigue and fever.   HENT:  Negative for congestion and sore throat.    Respiratory:  Negative for chest tightness.    Cardiovascular:  Positive for palpitations.   Gastrointestinal:  Negative for abdominal distention, abdominal pain and diarrhea.   Endocrine: Negative for cold intolerance and heat intolerance.   Genitourinary:  Negative for dysuria.   Musculoskeletal:  Negative for arthralgias and back pain.   Skin:  Negative for color change and rash.   Neurological:  Negative for dizziness, tremors and headaches.   Psychiatric/Behavioral:  Negative for agitation. The patient is not nervous/anxious.    Objective:     Vital Signs (Most Recent):  Temp: 98.1 °F (36.7 °C) (10/06/22 1205)  Pulse: (!) 131 (10/06/22 1639)  Resp: 12 (10/06/22 1605)  BP: (!) 150/87 (10/06/22 1639)  SpO2: 100 % (10/06/22 1605)   Vital Signs (24h Range):  Temp:  [97.9 °F (36.6 °C)-98.1 °F (36.7 °C)] 98.1 °F (36.7 °C)  Pulse:  [100-141] 131  Resp:  [12-22] 12  SpO2:  [99 %-100 %] 100 %  BP: (113-150)/(66-88) 150/87     Weight: 64 kg (141 lb)  Body mass index is 25.79 kg/m².    Physical Exam  Constitutional:       Appearance: Normal appearance.   HENT:      Head: Normocephalic and atraumatic.      Nose: Nose normal.      Mouth/Throat:      Mouth: Mucous membranes are moist.      Pharynx: Oropharynx is clear.   Eyes:      Extraocular Movements: Extraocular movements intact.      Conjunctiva/sclera: Conjunctivae normal.      Pupils: Pupils are equal, round, and reactive to light.   Cardiovascular:      Rate and Rhythm:  Tachycardia present. Rhythm irregular.      Pulses: Normal pulses.      Heart sounds: Normal heart sounds.   Pulmonary:      Effort: Pulmonary effort is normal.      Breath sounds: Normal breath sounds.   Abdominal:      General: Abdomen is flat. Bowel sounds are normal.      Palpations: Abdomen is soft.   Musculoskeletal:         General: Normal range of motion.      Cervical back: Normal range of motion and neck supple.   Skin:     General: Skin is warm and dry.   Neurological:      General: No focal deficit present.      Mental Status: She is alert and oriented to person, place, and time.   Psychiatric:         Mood and Affect: Mood normal.         Behavior: Behavior normal.         CRANIAL NERVES     CN III, IV, VI   Pupils are equal, round, and reactive to light.     Significant Labs: All pertinent labs within the past 24 hours have been reviewed.    Significant Imaging: I have reviewed all pertinent imaging results/findings within the past 24 hours.

## 2022-10-06 NOTE — ED PROVIDER NOTES
Encounter Date: 10/6/2022       History     Chief Complaint   Patient presents with    Tachycardia     Sent to ER by Dr Culver for increased heart rate. Denies CP     81 y/o [place] female with a history of CVA, DM, and hypertension presents to the ER via private vehicle for c/o palpitations ongoing x1-2 months. She initially presented to Dr. Culver's clinic who recommended ER evaluation. She denies having any associated cardiac symptoms but she does have c/o abdominal pain. Spouse also reports that she had a seizure 2-3 days ago.     The history is provided by the patient and the spouse. The history is limited by a language barrier. No  was used.   Palpitations   This is a recurrent problem. The current episode started several weeks ago. The problem occurs intermittently. The problem has been gradually worsening. On average, each episode lasts 30 minutes. Associated symptoms include abdominal pain. Pertinent negatives include no fever, no chest pain, no syncope, no nausea, no vomiting, no headaches, no lower extremity edema, no dizziness, no weakness and no cough. She has tried nothing for the symptoms. Her past medical history does not include anemia, heart disease, hyperthyroidism or valve disorder.   Review of patient's allergies indicates:  No Known Allergies  Past Medical History:   Diagnosis Date    CVA (cerebral vascular accident)     Diabetes mellitus     Epilepsy, unspecified, not intractable, without status epilepticus     Hyperlipidemia     Hypertension      History reviewed. No pertinent surgical history.  History reviewed. No pertinent family history.  Social History     Tobacco Use    Smoking status: Never    Smokeless tobacco: Never   Substance Use Topics    Alcohol use: Never    Drug use: Never     Review of Systems   Constitutional:  Negative for fever.   Eyes:  Negative for visual disturbance.   Respiratory:  Negative for cough and chest tightness.    Cardiovascular:  Positive  for palpitations. Negative for chest pain, leg swelling and syncope.   Gastrointestinal:  Positive for abdominal pain. Negative for diarrhea, nausea and vomiting.   Neurological:  Positive for seizures. Negative for dizziness, weakness and headaches.     Physical Exam     Initial Vitals [10/06/22 1205]   BP Pulse Resp Temp SpO2   (!) 140/88 (!) 141 16 98.1 °F (36.7 °C) 100 %      MAP       --         Physical Exam    Vitals reviewed.  Constitutional: She appears well-developed and well-nourished.   Eyes: Conjunctivae are normal.   Neck:       Normal range of motion.  Cardiovascular:  Regular rhythm and normal heart sounds.   Tachycardia present.         Abdominal: Abdomen is soft.   Musculoskeletal:         General: Normal range of motion.      Cervical back: Normal range of motion.      Right lower leg: No edema.      Left lower leg: No edema.     Neurological: She is alert and oriented to person, place, and time. GCS score is 15. GCS eye subscore is 4. GCS verbal subscore is 5. GCS motor subscore is 6.   Skin: Skin is warm. Capillary refill takes less than 2 seconds.   Psychiatric: She has a normal mood and affect. Her behavior is normal. Judgment and thought content normal.       Medical Screening Exam   See Full Note    ED Course   Procedures  Labs Reviewed   COMPREHENSIVE METABOLIC PANEL - Abnormal; Notable for the following components:       Result Value    Sodium 131 (*)     Chloride 96 (*)     Creatinine 1.25 (*)     eGFR 44 (*)     All other components within normal limits   NT-PRO NATRIURETIC PEPTIDE - Abnormal; Notable for the following components:    ProBNP 4,032 (*)     All other components within normal limits   CBC WITH DIFFERENTIAL - Abnormal; Notable for the following components:    RBC 2.50 (*)     Hemoglobin 7.7 (*)     Hematocrit 23.4 (*)     MPV 8.4 (*)     Neutrophils % 66.4 (*)     Lymphocytes % 25.6 (*)     Immature Granulocytes % 0.5 (*)     All other components within normal limits    TROPONIN I - Normal   CBC W/ AUTO DIFFERENTIAL    Narrative:     The following orders were created for panel order CBC auto differential.  Procedure                               Abnormality         Status                     ---------                               -----------         ------                     CBC with Differential[[id number]]        Abnormal            Final result                 Please view results for these tests on the individual orders.   SARS-COV2 (COVID) W/ FLU ANTIGEN   TYPE & SCREEN   ABORH RETYPE   PREPARE RBC SOFT        ECG Results              EKG 12-lead (In process)  Result time 10/06/22 14:01:40      In process by Interface, Lab In [facility] [place] (10/06/22 14:01:40)                   Narrative:    Test Reason : R00.0,    Vent. Rate : 136 BPM     Atrial Rate : 000 BPM     P-R Int : 000 ms          QRS Dur : 074 ms      QT Int : 322 ms       P-R-T Axes : 000 041 268 degrees     QTc Int : 455 ms    Atrial flutter  with rapid ventricular response  with 2:1 A-V block  Widespread ST-T abnormality  may be due to myocardial ischemia  Abnormal ECG      Referred By: [email or web address]   SELF           Confirmed By:                                   Imaging Results              X-Ray Chest AP Portable (Final result)  Result time 10/06/22 13:52:03      Final result by Ilan Amezquita DO (10/06/22 13:52:03)                   Impression:      No acute cardiopulmonary process demonstrated.    Point of Service: [facility]      Electronically signed by: Ilan Amezquita  Date:    10/06/2022  Time:    13:52               Narrative:    EXAMINATION:  XR CHEST AP PORTABLE    CLINICAL HISTORY:  Shortness of breath    COMPARISON:  Chest x-ray June 21, 2022    TECHNIQUE:  Frontal view/views of the chest.    FINDINGS:  The cardiomediastinal silhouette is stable in configuration.  Chronic change of the lungs without focal consolidation, pleural effusion, or pneumothorax.  Visualized osseous and surrounding soft  tissue structures appear grossly unchanged.                                       Medications   metoprolol injection 5 mg (has no administration in time range)   metoprolol injection 5 mg (5 mg Intravenous Given 10/6/22 1256)     Medical Decision Making:   Other:   I have discussed this case with another health care provider.       <> Summary of the Discussion: Discussed with Dr. Lutz. Agrees with need for admission.   Spoke with Dr. Starkey about possible admission. Discussed possible admission to observation. Will monitor a little longer.                  Clinical Impression:   Final diagnoses:  [R00.0] Tachycardia  [R07.9] Chest pain  [R06.02] Shortness of breath  [I48.92] Atrial flutter with rapid ventricular response (Primary)               GERRY Armenta  10/06/22 1406       GERRY Armenta  10/06/22 1419

## 2022-10-06 NOTE — Clinical Note
Diagnosis: Atrial flutter with rapid ventricular response [188710]   Future Attending Provider: MAURICE DUMONT [[id number]]   Admitting Provider:: MAURICE DUMONT [[id number]]   Special Needs::  [18]

## 2022-10-06 NOTE — ASSESSMENT & PLAN NOTE
Takes naproxen for joint pain.   Mild epigastric pain and on PPI  Hg 7.7 down from 10 four months ago.   FOBT ordered  F/u iron panel

## 2022-10-07 VITALS
TEMPERATURE: 98 F | DIASTOLIC BLOOD PRESSURE: 46 MMHG | BODY MASS INDEX: 25.95 KG/M2 | HEIGHT: 62 IN | HEART RATE: 58 BPM | SYSTOLIC BLOOD PRESSURE: 101 MMHG | RESPIRATION RATE: 20 BRPM | WEIGHT: 141 LBS | OXYGEN SATURATION: 97 %

## 2022-10-07 DIAGNOSIS — I10 PRIMARY HYPERTENSION: Primary | ICD-10-CM

## 2022-10-07 LAB
ANION GAP SERPL CALCULATED.3IONS-SCNC: 8 MMOL/L (ref 7–16)
AORTIC ROOT ANNULUS: 2.7 CM
AORTIC VALVE CUSP SEPERATION: 2.08 CM
AV INDEX (PROSTH): 0.6
AV MEAN GRADIENT: 2 MMHG
AV PEAK GRADIENT: 3 MMHG
AV VALVE AREA: 1.53 CM2
AV VELOCITY RATIO: 0.63
BASOPHILS # BLD AUTO: 0.03 K/UL (ref 0–0.2)
BASOPHILS NFR BLD AUTO: 0.6 % (ref 0–1)
BSA FOR ECHO PROCEDURE: 1.67 M2
BUN SERPL-MCNC: 15 MG/DL (ref 7–18)
BUN/CREAT SERPL: 14 (ref 6–20)
CALCIUM SERPL-MCNC: 9.2 MG/DL (ref 8.5–10.1)
CHLORIDE SERPL-SCNC: 106 MMOL/L (ref 98–107)
CO2 SERPL-SCNC: 28 MMOL/L (ref 21–32)
CREAT SERPL-MCNC: 1.08 MG/DL (ref 0.55–1.02)
CV ECHO LV RWT: 0.46 CM
D DIMER PPP FEU-MCNC: 0.46 ΜG/ML (ref 0–0.47)
DIFFERENTIAL METHOD BLD: ABNORMAL
DOP CALC AO PEAK VEL: 0.8 M/S
DOP CALC AO VTI: 15 CM
DOP CALC LVOT AREA: 2.5 CM2
DOP CALC LVOT DIAMETER: 1.8 CM
DOP CALC LVOT PEAK VEL: 0.5 M/S
DOP CALC LVOT STROKE VOLUME: 22.89 CM3
DOP CALCLVOT PEAK VEL VTI: 9 CM
E WAVE DECELERATION TIME: 192 MSEC
ECHO EF ESTIMATED: 50 %
ECHO LV POSTERIOR WALL: 0.99 CM (ref 0.6–1.1)
EGFR (NO RACE VARIABLE) (RUSH/TITUS): 52 ML/MIN/1.73M²
EJECTION FRACTION: 50 %
EOSINOPHIL # BLD AUTO: 0.13 K/UL (ref 0–0.5)
EOSINOPHIL NFR BLD AUTO: 2.4 % (ref 1–4)
ERYTHROCYTE [DISTWIDTH] IN BLOOD BY AUTOMATED COUNT: 12.9 % (ref 11.5–14.5)
FERRITIN SERPL-MCNC: 132 NG/ML (ref 8–252)
FRACTIONAL SHORTENING: 22 % (ref 28–44)
GLUCOSE SERPL-MCNC: 103 MG/DL (ref 70–105)
GLUCOSE SERPL-MCNC: 106 MG/DL (ref 74–106)
GLUCOSE SERPL-MCNC: 113 MG/DL (ref 70–105)
GLUCOSE SERPL-MCNC: 96 MG/DL (ref 70–105)
HCT VFR BLD AUTO: 31.4 % (ref 38–47)
HGB BLD-MCNC: 10.4 G/DL (ref 12–16)
IMM GRANULOCYTES # BLD AUTO: 0.01 K/UL (ref 0–0.04)
IMM GRANULOCYTES NFR BLD: 0.2 % (ref 0–0.4)
INTERVENTRICULAR SEPTUM: 0.91 CM (ref 0.6–1.1)
IRON SATN MFR SERPL: 19 % (ref 14–50)
IRON SERPL-MCNC: 55 ΜG/DL (ref 50–170)
IVC OSTIUM: 1 CM
LEFT ATRIUM SIZE: 3.2 CM
LEFT INTERNAL DIMENSION IN SYSTOLE: 3.31 CM (ref 2.1–4)
LEFT VENTRICLE DIASTOLIC VOLUME INDEX: 49.52 ML/M2
LEFT VENTRICLE DIASTOLIC VOLUME: 81.7 ML
LEFT VENTRICLE MASS INDEX: 80 G/M2
LEFT VENTRICLE SYSTOLIC VOLUME INDEX: 27 ML/M2
LEFT VENTRICLE SYSTOLIC VOLUME: 44.5 ML
LEFT VENTRICULAR INTERNAL DIMENSION IN DIASTOLE: 4.27 CM (ref 3.5–6)
LEFT VENTRICULAR MASS: 131.25 G
LVOT MG: 1 MMHG
LYMPHOCYTES # BLD AUTO: 1.64 K/UL (ref 1–4.8)
LYMPHOCYTES NFR BLD AUTO: 30.3 % (ref 27–41)
MAGNESIUM SERPL-MCNC: 1.6 MG/DL (ref 1.7–2.3)
MCH RBC QN AUTO: 30.6 PG (ref 27–31)
MCHC RBC AUTO-ENTMCNC: 33.1 G/DL (ref 32–36)
MCV RBC AUTO: 92.4 FL (ref 80–96)
MONOCYTES # BLD AUTO: 0.41 K/UL (ref 0–0.8)
MONOCYTES NFR BLD AUTO: 7.6 % (ref 2–6)
MPC BLD CALC-MCNC: 8.7 FL (ref 9.4–12.4)
MV PEAK E VEL: 0.69 M/S
NEUTROPHILS # BLD AUTO: 3.19 K/UL (ref 1.8–7.7)
NEUTROPHILS NFR BLD AUTO: 58.9 % (ref 53–65)
NRBC # BLD AUTO: 0 X10E3/UL
NRBC, AUTO (.00): 0 %
PISA TR MAX VEL: 2.6 M/S
PLATELET # BLD AUTO: 301 K/UL (ref 150–400)
POTASSIUM SERPL-SCNC: 4.2 MMOL/L (ref 3.5–5.1)
RA MAJOR: 3.2 CM
RA PRESSURE: 3 MMHG
RBC # BLD AUTO: 3.4 M/UL (ref 4.2–5.4)
RIGHT VENTRICULAR END-DIASTOLIC DIMENSION: 2.8 CM
SODIUM SERPL-SCNC: 138 MMOL/L (ref 136–145)
TIBC SERPL-MCNC: 288 ΜG/DL (ref 250–450)
TR MAX PG: 27 MMHG
TRICUSPID ANNULAR PLANE SYSTOLIC EXCURSION: 1.6 CM
TROPONIN I SERPL HS-MCNC: 15.9 PG/ML
TV REST PULMONARY ARTERY PRESSURE: 30 MMHG
WBC # BLD AUTO: 5.41 K/UL (ref 4.5–11)

## 2022-10-07 PROCEDURE — 93010 EKG 12-LEAD: ICD-10-PCS | Mod: ,,, | Performed by: HOSPITALIST

## 2022-10-07 PROCEDURE — 83735 ASSAY OF MAGNESIUM: CPT | Performed by: HOSPITALIST

## 2022-10-07 PROCEDURE — 93005 ELECTROCARDIOGRAM TRACING: CPT

## 2022-10-07 PROCEDURE — 85025 COMPLETE CBC W/AUTO DIFF WBC: CPT | Performed by: HOSPITALIST

## 2022-10-07 PROCEDURE — 82962 GLUCOSE BLOOD TEST: CPT

## 2022-10-07 PROCEDURE — 93010 ELECTROCARDIOGRAM REPORT: CPT | Mod: ,,, | Performed by: HOSPITALIST

## 2022-10-07 PROCEDURE — 96366 THER/PROPH/DIAG IV INF ADDON: CPT | Mod: 59

## 2022-10-07 PROCEDURE — 63600175 PHARM REV CODE 636 W HCPCS: Performed by: HOSPITALIST

## 2022-10-07 PROCEDURE — 84484 ASSAY OF TROPONIN QUANT: CPT | Performed by: REGISTERED NURSE

## 2022-10-07 PROCEDURE — G0378 HOSPITAL OBSERVATION PER HR: HCPCS

## 2022-10-07 PROCEDURE — 80048 BASIC METABOLIC PNL TOTAL CA: CPT | Performed by: HOSPITALIST

## 2022-10-07 PROCEDURE — 82728 ASSAY OF FERRITIN: CPT | Performed by: HOSPITALIST

## 2022-10-07 PROCEDURE — 96365 THER/PROPH/DIAG IV INF INIT: CPT

## 2022-10-07 PROCEDURE — 99217 PR OBSERVATION CARE DISCHARGE: CPT | Mod: ,,, | Performed by: HOSPITALIST

## 2022-10-07 PROCEDURE — 83540 ASSAY OF IRON: CPT | Performed by: HOSPITALIST

## 2022-10-07 PROCEDURE — 99217 PR OBSERVATION CARE DISCHARGE: ICD-10-PCS | Mod: ,,, | Performed by: HOSPITALIST

## 2022-10-07 PROCEDURE — 36415 COLL VENOUS BLD VENIPUNCTURE: CPT | Performed by: REGISTERED NURSE

## 2022-10-07 PROCEDURE — 36415 COLL VENOUS BLD VENIPUNCTURE: CPT | Performed by: HOSPITALIST

## 2022-10-07 PROCEDURE — 85378 FIBRIN DEGRADE SEMIQUANT: CPT | Performed by: HOSPITALIST

## 2022-10-07 PROCEDURE — 25000003 PHARM REV CODE 250: Performed by: HOSPITALIST

## 2022-10-07 RX ORDER — METOPROLOL TARTRATE 25 MG/1
25 TABLET, FILM COATED ORAL 2 TIMES DAILY
Status: DISCONTINUED | OUTPATIENT
Start: 2022-10-07 | End: 2022-10-07 | Stop reason: HOSPADM

## 2022-10-07 RX ORDER — MAGNESIUM SULFATE HEPTAHYDRATE 40 MG/ML
2 INJECTION, SOLUTION INTRAVENOUS ONCE
Status: COMPLETED | OUTPATIENT
Start: 2022-10-07 | End: 2022-10-07

## 2022-10-07 RX ORDER — METOPROLOL SUCCINATE 25 MG/1
12.5 TABLET, EXTENDED RELEASE ORAL 2 TIMES DAILY
Qty: 15 TABLET | Refills: 1 | Status: SHIPPED | OUTPATIENT
Start: 2022-10-07 | End: 2023-12-27 | Stop reason: SDUPTHER

## 2022-10-07 RX ADMIN — LEVETIRACETAM 500 MG: 500 TABLET ORAL at 08:10

## 2022-10-07 RX ADMIN — APIXABAN 2.5 MG: 2.5 TABLET, FILM COATED ORAL at 06:10

## 2022-10-07 RX ADMIN — MAGNESIUM SULFATE IN WATER 2 G: 40 INJECTION, SOLUTION INTRAVENOUS at 08:10

## 2022-10-07 RX ADMIN — METOPROLOL TARTRATE 25 MG: 25 TABLET, FILM COATED ORAL at 06:10

## 2022-10-07 RX ADMIN — PANTOPRAZOLE SODIUM 40 MG: 40 TABLET, DELAYED RELEASE ORAL at 08:10

## 2022-10-07 RX ADMIN — ATORVASTATIN CALCIUM 20 MG: 20 TABLET, FILM COATED ORAL at 08:10

## 2022-10-07 NOTE — H&P
[facility] - Emergency Department  Hospital Medicine  History & Physical    Patient Name: Zbigniew Ventura  MRN: [id number]  Patient Class: OP- Observation  Admission Date: 10/6/2022  Attending Physician: Colt Davis MD   Primary Care Provider: Primary Doctor No         Patient information was obtained from patient, past medical records and ER records.     Subjective:     Principal Problem:Atrial fibrillation with RVR    Chief Complaint:   Chief Complaint   Patient presents with    Tachycardia     Sent to ER by Dr Culver for increased heart rate. Denies CP        HPI: 83yo woman history of DM2, CVA (2 years ago), seizures, HTN, HLD, dizziness who presents from her PCP with tachycardia.  She was found to have atrial fibrillation.  She is here with her  Tristan Arreaga.  They state that she has had intermittent palpitations.  There is no history of atrial fibrillation and she is not currently on anticoagulation.  She take daily naproxen for joint pain.  She and her  have no other concerns at this time.      In the emergency department she was given 5 mg IV metoprolol with improvement in her heart rate.  However heart rate returned to 130 beats per minute and she was given 25 mg of Lopressor.  Hospital Medicine was requested to admit for atrial fibrillation with RVR.      Past Medical History:   Diagnosis Date    CVA (cerebral vascular accident)     Diabetes mellitus     Epilepsy, unspecified, not intractable, without status epilepticus     Hyperlipidemia     Hypertension        History reviewed. No pertinent surgical history.    Review of patient's allergies indicates:  No Known Allergies    No current facility-administered medications on file prior to encounter.     Current Outpatient Medications on File Prior to Encounter   Medication Sig    amLODIPine (NORVASC) 5 MG tablet Take 1 tablet (5 mg total) by mouth once daily.    atorvastatin (LIPITOR) 20 MG tablet Take 1 tablet (20 mg total)  by mouth once daily.    FEROSUL 325 mg (65 mg iron) Tab tablet TAKE ONE (1) TABLET BY MOUTH TWICE DAILY    levETIRAcetam (KEPPRA) 500 MG Tab Take 1 tablet (500 mg total) by mouth 2 (two) times daily.    meclizine (ANTIVERT) 25 MG tablet TAKE ONE (1) TABLET BY MOUTH EVERY 12 HOURS AS NEEDED    naproxen (NAPROSYN) 375 MG tablet TAKE ONE (1) TABLET (375 MG TOTAL) BY MOUTH  TWICE DAILY WITH FOOD AS NEEDED FOR KNEE PAIN    omeprazole (PRILOSEC) 20 MG capsule Take 1 capsule (20 mg total) by mouth once daily.    pioglitazone (ACTOS) 30 MG tablet Take 1 tablet (30 mg total) by mouth once daily.    [DISCONTINUED] amLODIPine (NORVASC) 5 MG tablet Take 1 tablet (5 mg total) by mouth once daily.    [DISCONTINUED] atorvastatin (LIPITOR) 20 MG tablet Take 1 tablet (20 mg total) by mouth once daily.    [DISCONTINUED] FEROSUL 325 mg (65 mg iron) Tab tablet TAKE ONE (1) TABLET BY MOUTH TWICE DAILY    [DISCONTINUED] levETIRAcetam (KEPPRA) 500 MG Tab Take 1 tablet (500 mg total) by mouth 2 (two) times daily.    [DISCONTINUED] meclizine (ANTIVERT) 25 MG tablet TAKE ONE (1) TABLET BY MOUTH EVERY 12 HOURS AS NEEDED    [DISCONTINUED] naproxen (NAPROSYN) 375 MG tablet TAKE ONE (1) TABLET (375 MG TOTAL) BY MOUTH  TWICE DAILY WITH FOOD AS NEEDED FOR KNEE PAIN    [DISCONTINUED] omeprazole (PRILOSEC) 20 MG capsule Take 1 capsule (20 mg total) by mouth once daily.    [DISCONTINUED] pioglitazone (ACTOS) 30 MG tablet Take 1 tablet (30 mg total) by mouth once daily.     Family History    None       Tobacco Use    Smoking status: Never    Smokeless tobacco: Never   Substance and Sexual Activity    Alcohol use: Never    Drug use: Never    Sexual activity: Not on file     Review of Systems   Constitutional:  Negative for activity change, chills, fatigue and fever.   HENT:  Negative for congestion and sore throat.    Respiratory:  Negative for chest tightness.    Cardiovascular:  Positive for palpitations.   Gastrointestinal:   Negative for abdominal distention, abdominal pain and diarrhea.   Endocrine: Negative for cold intolerance and heat intolerance.   Genitourinary:  Negative for dysuria.   Musculoskeletal:  Negative for arthralgias and back pain.   Skin:  Negative for color change and rash.   Neurological:  Negative for dizziness, tremors and headaches.   Psychiatric/Behavioral:  Negative for agitation. The patient is not nervous/anxious.    Objective:     Vital Signs (Most Recent):  Temp: 98.1 °F (36.7 °C) (10/06/22 1205)  Pulse: (!) 131 (10/06/22 1639)  Resp: 12 (10/06/22 1605)  BP: (!) 150/87 (10/06/22 1639)  SpO2: 100 % (10/06/22 1605)   Vital Signs (24h Range):  Temp:  [97.9 °F (36.6 °C)-98.1 °F (36.7 °C)] 98.1 °F (36.7 °C)  Pulse:  [100-141] 131  Resp:  [12-22] 12  SpO2:  [99 %-100 %] 100 %  BP: (113-150)/(66-88) 150/87     Weight: 64 kg (141 lb)  Body mass index is 25.79 kg/m².    Physical Exam  Constitutional:       Appearance: Normal appearance.   HENT:      Head: Normocephalic and atraumatic.      Nose: Nose normal.      Mouth/Throat:      Mouth: Mucous membranes are moist.      Pharynx: Oropharynx is clear.   Eyes:      Extraocular Movements: Extraocular movements intact.      Conjunctiva/sclera: Conjunctivae normal.      Pupils: Pupils are equal, round, and reactive to light.   Cardiovascular:      Rate and Rhythm: Tachycardia present. Rhythm irregular.      Pulses: Normal pulses.      Heart sounds: Normal heart sounds.   Pulmonary:      Effort: Pulmonary effort is normal.      Breath sounds: Normal breath sounds.   Abdominal:      General: Abdomen is flat. Bowel sounds are normal.      Palpations: Abdomen is soft.   Musculoskeletal:         General: Normal range of motion.      Cervical back: Normal range of motion and neck supple.   Skin:     General: Skin is warm and dry.   Neurological:      General: No focal deficit present.      Mental Status: She is alert and oriented to person, place, and time.   Psychiatric:          Mood and Affect: Mood normal.         Behavior: Behavior normal.         CRANIAL NERVES     CN III, IV, VI   Pupils are equal, round, and reactive to light.     Significant Labs: All pertinent labs within the past 24 hours have been reviewed.    Significant Imaging: I have reviewed all pertinent imaging results/findings within the past 24 hours.    Assessment/Plan:     * Atrial fibrillation with RVR  Patient with Paroxysmal (<7 days) atrial fibrillation which is uncontrolled currently with Calcium Channel Blocker. Patient is currently in atrial fibrillation.[id number] Score: 7. HASBLED Score: 4. Anticoagulation not indicated due to severe anemia and epigastric pain. Will give DVT prophylaxis.       F/u echo   F/u TSH               Elevated brain natriuretic peptide (BNP) level  BNP 4032  No shortness of breath or edema  May be related to atrial fibrillation   F/u echo       Severe anemia  Takes naproxen for joint pain.   Mild epigastric pain and on PPI  Hg 7.7 down from 10 four months ago.   FOBT ordered  F/u iron panel      Type 2 diabetes mellitus, without long-term current use of insulin  Patient's FSGs are controlled on current medication regimen.  Last A1c reviewed-   Lab Results   Component Value Date    HGBA1C 6.0 06/16/2021     Most recent fingerstick glucose reviewed- No results for input(s): POCTGLUCOSE in the last 24 hours.  Current correctional scale  Low  Maintain anti-hyperglycemic dose as follows-   Antihyperglycemics (From admission, onward)    Start     Stop Route Frequency Ordered    10/06/22 1838  insulin aspart U-100 injection 0-5 Units         -- SubQ Before meals & nightly PRN 10/06/22 1742        Hold Oral hypoglycemics while patient is in the hospital.    Hyperlipidemia  Continue home statin.      Primary hypertension  Continue home blood pressure medications as clinical course permits.      Dizziness  Patient takes meclizine.  No current dizziness and will hold for now.      Left  hemiparesis  No gross signs of left-sided weakness.  Further discussed with patient and family.      Cerebrovascular accident (CVA)  Patient not on aspirin.    Continue home statin      Seizure  Continue home Keppra.    VTE Risk Mitigation (From admission, onward)         Ordered     enoxaparin injection 40 mg  Daily         10/06/22 1742     IP VTE HIGH RISK PATIENT  Once         10/06/22 1742     Place sequential compression device  Until discontinued         10/06/22 1742                   Colt Davis MD  Department of Hospital Medicine   [facility] - Emergency Department

## 2022-10-07 NOTE — DISCHARGE SUMMARY
[facility] - Emergency Department  Hospital Medicine  Discharge Summary      Patient Name: Zbigniew Ventura  MRN: [id number]  Patient Class: OP- Observation  Admission Date: 10/6/2022  Hospital Length of Stay: 0 days  Discharge Date and Time:  10/07/2022 5:24 PM  Attending Physician: Colt Davis MD   Discharging Provider: Colt Davis MD  Primary Care Provider: Primary Doctor No      HPI:   81yo woman history of DM2, CVA (2 years ago), seizures, HTN, HLD, dizziness who presents from her PCP with tachycardia.  She was found to have atrial fibrillation.  She is here with her  Tristan Arreaga.  They state that she has had intermittent palpitations.  There is no history of atrial fibrillation and she is not currently on anticoagulation.  She take daily naproxen for joint pain.  She and her  have no other concerns at this time.      In the emergency department she was given 5 mg IV metoprolol with improvement in her heart rate.  However heart rate returned to 130 beats per minute and she was given 25 mg of Lopressor.  Hospital Medicine was requested to admit for atrial fibrillation with RVR.      * No surgery found *      Hospital Course:   No notes on file     Goals of Care Treatment Preferences:  Code Status: Full Code      Consults:   Consults (From admission, onward)        Status Ordering Provider     Inpatient consult to Cardiology  Once        Provider:  Carlos Alcazar MD    Completed COLT DAVIS          * Atrial fibrillation with RVR  Patient with Paroxysmal (<7 days) atrial fibrillation which is uncontrolled currently with Calcium Channel Blocker. Patient is currently in atrial fibrillation.[id number] Score: 7. HASBLED Score: 4. Anticoagulation not indicated due to severe anemia and epigastric pain. Will give DVT prophylaxis.       TSH - normal     Results for orders placed during the hospital encounter of 10/06/22    Echo    Interpretation Summary  · The left ventricle  is normal in size with low normal systolic function.  · The estimated ejection fraction is 50%.  · Normal left ventricular diastolic function.  · Normal right ventricular size.  · Mild mitral regurgitation.  · Mild tricuspid regurgitation.  · Normal central venous pressure (3 mmHg).  · The estimated PA systolic pressure is 30 mmHg.  · There is left ventricular global hypokinesis.                  Elevated brain natriuretic peptide (BNP) level  BNP 4032  No shortness of breath or edema  May be related to atrial fibrillation   F/u echo     Results for orders placed during the hospital encounter of 10/06/22    Echo    Interpretation Summary  · The left ventricle is normal in size with low normal systolic function.  · The estimated ejection fraction is 50%.  · Normal left ventricular diastolic function.  · Normal right ventricular size.  · Mild mitral regurgitation.  · Mild tricuspid regurgitation.  · Normal central venous pressure (3 mmHg).  · The estimated PA systolic pressure is 30 mmHg.  · There is left ventricular global hypokinesis.      Severe anemia  Takes naproxen for joint pain.   Mild epigastric pain and on PPI  Hg 7.7 down from 10 four months ago.   FOBT ordered  F/u iron panel      Hg improved without transfusion  Iron panel normal.     Type 2 diabetes mellitus, without long-term current use of insulin  Patient's FSGs are controlled on current medication regimen.  Last A1c reviewed-   Lab Results   Component Value Date    HGBA1C 6.0 06/16/2021     Most recent fingerstick glucose reviewed- No results for input(s): POCTGLUCOSE in the last 24 hours.  Current correctional scale  Low  Maintain anti-hyperglycemic dose as follows-   Antihyperglycemics (From admission, onward)    Start     Stop Route Frequency Ordered    10/06/22 1838  insulin aspart U-100 injection 0-5 Units         -- SubQ Before meals & nightly PRN 10/06/22 1742        Hold Oral hypoglycemics while patient is in the  hospital.    Hyperlipidemia  Continue home statin.      Primary hypertension  Continue home blood pressure medications as clinical course permits.      Dizziness  Patient takes meclizine.  No current dizziness and will hold for now.      Cerebrovascular accident (CVA)  Patient not on aspirin.    Continue home statin      Seizure  Continue home Keppra.      Final Active Diagnoses:    Diagnosis Date Noted POA    PRINCIPAL PROBLEM:  Atrial fibrillation with RVR [I48.91] 10/06/2022 Yes    Primary hypertension [I10] 10/06/2022 Yes    Hyperlipidemia [E78.5] 10/06/2022 Yes    Type 2 diabetes mellitus, without long-term current use of insulin [E11.9] 10/06/2022 Yes    Severe anemia [D64.9] 10/06/2022 Yes    Elevated brain natriuretic peptide (BNP) level [R79.89] 10/06/2022 Yes    Dizziness [R42] 09/13/2021 Yes    Seizure [R56.9] 05/10/2021 Yes     Chronic    Cerebrovascular accident (CVA) [I63.9] 05/10/2021 Yes     Chronic    Left hemiparesis [G81.94] 05/10/2021 Yes     Chronic      Problems Resolved During this Admission:       Discharged Condition: fair    Disposition: Home or Self Care    Follow Up:   Follow-up Information     Primary Doctor No Follow up in 1 week(s).           Carlos Alcazar MD Follow up in 2 week(s).    Specialties: Interventional Cardiology, Cardiology  Contact information:  [address] [facility] [address]  [phone]                       Patient Instructions:      Diet Adult Regular     Notify your health care provider if you experience any of the following:  temperature >100.4     Notify your health care provider if you experience any of the following:  persistent nausea and vomiting or diarrhea     Notify your health care provider if you experience any of the following:  severe uncontrolled pain     Notify your health care provider if you experience any of the following:  redness, tenderness, or signs of infection (pain, swelling, redness, odor or green/yellow discharge around  incision site)     Notify your health care provider if you experience any of the following:  difficulty breathing or increased cough     Notify your health care provider if you experience any of the following:  severe persistent headache     Notify your health care provider if you experience any of the following:  worsening rash     Notify your health care provider if you experience any of the following:  persistent dizziness, light-headedness, or visual disturbances     Notify your health care provider if you experience any of the following:  increased confusion or weakness     Notify your health care provider if you experience any of the following:     Activity as tolerated       Significant Diagnostic Studies: Labs:   BMP:   Recent Labs   Lab 10/06/22  1252 10/07/22  0419    106   * 138   K 4.1 4.2   CL 96* 106   CO2 24 28   BUN 17 15   CREATININE 1.25* 1.08*   CALCIUM 9.0 9.2   MG  --  1.6*   , CMP   Recent Labs   Lab 10/06/22  1252 10/07/22  0419   * 138   K 4.1 4.2   CL 96* 106   CO2 24 28    106   BUN 17 15   CREATININE 1.25* 1.08*   CALCIUM 9.0 9.2   PROT 7.8  --    ALBUMIN 4.3  --    BILITOT 0.4  --    ALKPHOS 76  --    AST 27  --    ALT 25  --    ANIONGAP 15 8    and CBC   Recent Labs   Lab 10/06/22  1252 10/07/22  0419   WBC 6.02 5.41   HGB 7.7* 10.4*   HCT 23.4* 31.4*    301       Pending Diagnostic Studies:     Procedure Component Value Units Date/Time    EKG 12-lead [[id number]] Collected: 10/06/22 2137    Order Status: Sent Lab Status: In process Updated: 10/07/22 1053    Narrative:      Test Reason : R00.0,    Vent. Rate : 095 BPM     Atrial Rate : 000 BPM     P-R Int : 210 ms          QRS Dur : 078 ms      QT Int : 358 ms       P-R-T Axes : 000 047 043 degrees     QTc Int : 419 ms     SIGNIFICANT ARRHYTHMIA   Sinus rhythm  with 2nd degree A-V block, Mobitz I (Wenckebach)  Anterior T wave abnormality  may be due to myocardial ischemia  Abnormal ECG      Referred By:  [email or web address]   SELF           Confirmed By:     EKG 12-lead [[id number]] Collected: 10/07/22 0811    Order Status: Sent Lab Status: In process Updated: 10/07/22 0903    Narrative:      Test Reason : R00.1,    Vent. Rate : 057 BPM     Atrial Rate : 000 BPM     P-R Int : 220 ms          QRS Dur : 074 ms      QT Int : 470 ms       P-R-T Axes : -18 044 050 degrees     QTc Int : 465 ms    Possible ectopic atrial rhythm  Anterior T wave abnormality  is nonspecific  Borderline ECG      Referred By: [email or web address]   SELF           Confirmed By:     Occult blood x 1, stool [[id number]]     Order Status: Sent Lab Status: No result     Specimen: Stool          Medications:  Reconciled Home Medications:      Medication List      START taking these medications    apixaban 2.5 mg Tab  Commonly known as: ELIQUIS  Take 1 tablet (2.5 mg total) by mouth 2 (two) times daily.     metoprolol succinate 25 MG 24 hr tablet  Commonly known as: TOPROL-XL  Take 0.5 tablets (12.5 mg total) by mouth 2 (two) times daily.        CONTINUE taking these medications    amLODIPine 5 MG tablet  Commonly known as: NORVASC  Take 1 tablet (5 mg total) by mouth once daily.     atorvastatin 20 MG tablet  Commonly known as: LIPITOR  Take 1 tablet (20 mg total) by mouth once daily.     FeroSuL 325 mg (65 mg iron) Tab tablet  Generic drug: ferrous sulfate  TAKE ONE (1) TABLET BY MOUTH TWICE DAILY     levETIRAcetam 500 MG Tab  Commonly known as: KEPPRA  Take 1 tablet (500 mg total) by mouth 2 (two) times daily.     meclizine 25 MG tablet  Commonly known as: ANTIVERT  TAKE ONE (1) TABLET BY MOUTH EVERY 12 HOURS AS NEEDED     naproxen 375 MG tablet  Commonly known as: NAPROSYN  TAKE ONE (1) TABLET (375 MG TOTAL) BY MOUTH  TWICE DAILY WITH FOOD AS NEEDED FOR KNEE PAIN     omeprazole 20 MG capsule  Commonly known as: PRILOSEC  Take 1 capsule (20 mg total) by mouth once daily.     pioglitazone 30 MG tablet  Commonly known as: ACTOS  Take 1 tablet (30 mg total) by mouth once  daily.            Indwelling Lines/Drains at time of discharge:   Lines/Drains/Airways     None                 Time spent on the discharge of patient: >30 minutes         Colt Davis MD  Department of Hospital Medicine  [facility] - Emergency Department

## 2022-10-07 NOTE — ASSESSMENT & PLAN NOTE
Takes naproxen for joint pain.   Mild epigastric pain and on PPI  Hg 7.7 down from 10 four months ago.   FOBT ordered  F/u iron panel      Hg improved without transfusion  Iron panel normal.

## 2022-10-07 NOTE — ASSESSMENT & PLAN NOTE
10/7/2022  - Cardiology will sign off at this time. Continue current tx plan.  - Telemetry  - Cmp, Lipid panel, D-dimer  - Lopressor 25 mg bid, Eliquis 2.5 mg renal dose bid, Lipitor 20 mg daily  - Tsh 6/22 WNL  - Bnp >4000, Troponin 12, D-Dimer 0.48, Mg 1.6.  - Mg 2 grams iv. Check Mg level after  - 1st Ekg: Atrial flutter  with rapid ventricular response  with 2:1 A-V block. ST-T abnormality.   - 2nd Ekg: Possible ectopic atrial rhythm. Anterior T wave abnormality. nonspecific  - Cxr: No acute cardiopulmonary process demonstrated  - Echo 10/7/22  * The left ventricle is normal in size with low normal systolic function.   The estimated ejection fraction is 50%.   Normal left ventricular diastolic function.   Normal right ventricular size.   Mild mitral regurgitation.   Mild tricuspid regurgitation.   Normal central venous pressure (3 mmHg).   The estimated PA systolic pressure is 30 mmHg.   There is left ventricular global hypokinesis.

## 2022-10-07 NOTE — PATIENT INSTRUCTIONS
Zbigniew was seen today for back pain.    Diagnoses and all orders for this visit:    Tachycardia  -     POCT EKG 12-LEAD (NOT FOR [facility] USE)    Seizure  -     levETIRAcetam (KEPPRA) 500 MG Tab; Take 1 tablet (500 mg total) by mouth 2 (two) times daily.    Other osteoarthritis of spine, thoracic region    Palpitation    Generalized abdominal pain    Other orders  -     amLODIPine (NORVASC) 5 MG tablet; Take 1 tablet (5 mg total) by mouth once daily.  -     atorvastatin (LIPITOR) 20 MG tablet; Take 1 tablet (20 mg total) by mouth once daily.  -     FEROSUL 325 mg (65 mg iron) Tab tablet; TAKE ONE (1) TABLET BY MOUTH TWICE DAILY  -     meclizine (ANTIVERT) 25 MG tablet; TAKE ONE (1) TABLET BY MOUTH EVERY 12 HOURS AS NEEDED  -     naproxen (NAPROSYN) 375 MG tablet; TAKE ONE (1) TABLET (375 MG TOTAL) BY MOUTH  TWICE DAILY WITH FOOD AS NEEDED FOR KNEE PAIN  -     omeprazole (PRILOSEC) 20 MG capsule; Take 1 capsule (20 mg total) by mouth once daily.  -     pioglitazone (ACTOS) 30 MG tablet; Take 1 tablet (30 mg total) by mouth once daily.  -     Influenza - Quadrivalent (Adjuvanted)

## 2022-10-07 NOTE — CONSULTS
[facility] - Emergency Department  Cardiology  Consult Note    Patient Name: Zbigniew Ventura  MRN: [id number]  Admission Date: 10/6/2022  Hospital Length of Stay: 0 days  Code Status: Full Code   Attending Provider: Colt Davis MD   Consulting Provider: LINETTE Pelayo  Primary Care Physician: Primary Doctor No  Principal Problem:Atrial fibrillation with RVR    Patient information was obtained from past medical records and ER records.     Inpatient consult to Cardiology  Consult performed by: LINETTE Pelayo  Consult ordered by: Colt Davis MD  Reason for consult: New onset A-FIB/A-Flutter with RVR      Subjective:     Chief Complaint:  New onset A-FIB/A-Flutter with RVR     HPI:   81 y/o female with a past medical history of HLD, HTN, SZ, DM 2, CVA right hemispheric (2 years ago), persistent dizziness admitted from [facility] complaining of palpitations. Originally presented to PCP and sent to ED and found to be in A- Fib/A-Flutter RVR.  Reports occasional palpitations for the past month. Denies chest pain, SOB, nausea, vomiting, diaphoresis, diarrhea, syncope or recent illness. Per ED note  reports seizure 2 days ago.     ED Workup: H/H 7/25. Rec/d 1 unit PRBC. BNP >4000, Troponin 12, D-Dimer 0.48, Mg 1.6. EKG: Atrial flutter  with rapid ventricular response  with 2:1 A-V block. ST-T abnormality. Cardizem 10 mg iv, Lopressor 5 mg iv    Social history: No history of EOH or Tobacco use.      Past Medical History:   Diagnosis Date    CVA (cerebral vascular accident)     Diabetes mellitus     Epilepsy, unspecified, not intractable, without status epilepticus     Hyperlipidemia     Hypertension        History reviewed. No pertinent surgical history.    Review of patient's allergies indicates:  No Known Allergies    No current facility-administered medications on file prior to encounter.     Current Outpatient Medications on File Prior to Encounter   Medication Sig     amLODIPine (NORVASC) 5 MG tablet Take 1 tablet (5 mg total) by mouth once daily.    atorvastatin (LIPITOR) 20 MG tablet Take 1 tablet (20 mg total) by mouth once daily.    FEROSUL 325 mg (65 mg iron) Tab tablet TAKE ONE (1) TABLET BY MOUTH TWICE DAILY    levETIRAcetam (KEPPRA) 500 MG Tab Take 1 tablet (500 mg total) by mouth 2 (two) times daily.    meclizine (ANTIVERT) 25 MG tablet TAKE ONE (1) TABLET BY MOUTH EVERY 12 HOURS AS NEEDED    naproxen (NAPROSYN) 375 MG tablet TAKE ONE (1) TABLET (375 MG TOTAL) BY MOUTH  TWICE DAILY WITH FOOD AS NEEDED FOR KNEE PAIN    omeprazole (PRILOSEC) 20 MG capsule Take 1 capsule (20 mg total) by mouth once daily.    pioglitazone (ACTOS) 30 MG tablet Take 1 tablet (30 mg total) by mouth once daily.     Family History    None       Tobacco Use    Smoking status: Never    Smokeless tobacco: Never   Substance and Sexual Activity    Alcohol use: Never    Drug use: Never    Sexual activity: Not on file     Review of Systems   Constitutional: Negative for diaphoresis.   Eyes:  Negative for vision loss in left eye, vision loss in right eye and visual disturbance.   Cardiovascular:  Positive for palpitations. Negative for chest pain, dyspnea on exertion, leg swelling, near-syncope, orthopnea and syncope.   Respiratory:  Negative for cough and shortness of breath.    All other systems reviewed and are negative.  Objective:     Vital Signs (Most Recent):  Temp: 98.2 °F (36.8 °C) (10/07/22 0617)  Pulse: (!) 58 (10/07/22 1410)  Resp: 13 (10/07/22 1410)  BP: 105/66 (10/07/22 1410)  SpO2: 100 % (10/07/22 1410)   Vital Signs (24h Range):  Temp:  [98.2 °F (36.8 °C)] 98.2 °F (36.8 °C)  Pulse:  [] 58  Resp:  [12-23] 13  SpO2:  [96 %-100 %] 100 %  BP: ()/(46-87) 105/66     Weight: 64 kg (141 lb)  Body mass index is 25.79 kg/m².    SpO2: 100 %         Intake/Output Summary (Last 24 hours) at 10/7/2022 1507  Last data filed at 10/7/2022 1129  Gross per 24 hour   Intake 50 ml    Output --   Net 50 ml       Lines/Drains/Airways       Peripheral Intravenous Line  Duration                  Peripheral IV - Single Lumen 10/06/22 1245 20 G Anterior;Distal;Left Forearm 1 day                    Physical Exam  Vitals reviewed.   Constitutional:       General: She is not in acute distress.     Appearance: Normal appearance.   HENT:      Head: Normocephalic and atraumatic.      Nose: Nose normal.      Mouth/Throat:      Mouth: Mucous membranes are moist.   Eyes:      Extraocular Movements: Extraocular movements intact.      Pupils: Pupils are equal, round, and reactive to light.   Cardiovascular:      Rate and Rhythm: Regular rhythm. Bradycardia present.      Pulses: Normal pulses.      Heart sounds: Normal heart sounds. No murmur heard.  Pulmonary:      Effort: Pulmonary effort is normal. No respiratory distress.      Breath sounds: Normal breath sounds.   Abdominal:      General: Bowel sounds are normal. There is no distension.      Palpations: Abdomen is soft.      Tenderness: There is no abdominal tenderness.   Musculoskeletal:         General: No swelling. Normal range of motion.      Cervical back: Normal range of motion and neck supple. No rigidity.   Skin:     General: Skin is warm and dry.      Capillary Refill: Capillary refill takes less than 2 seconds.   Neurological:      General: No focal deficit present.      Mental Status: She is alert and oriented to person, place, and time.      Cranial Nerves: No cranial nerve deficit.      Sensory: No sensory deficit.   Psychiatric:         Mood and Affect: Mood normal.       Significant Labs: All pertinent lab results from the last 24 hours have been reviewed.    Significant Imaging: EKG:      Assessment and Plan:     * Atrial fibrillation with RVR  10/7/2022  - Cardiology will sign off at this time. Continue current tx plan.  - Telemetry  - Cmp, Lipid panel, D-dimer  - Lopressor 25 mg bid, Eliquis 2.5 mg renal dose bid, Lipitor 20 mg daily  -  Tsh 6/22 WNL  - Bnp >4000, Troponin 12, D-Dimer 0.48, Mg 1.6.  - Mg 2 grams iv. Check Mg level after  - 1st Ekg: Atrial flutter  with rapid ventricular response  with 2:1 A-V block. ST-T abnormality.   - 2nd Ekg: Possible ectopic atrial rhythm. Anterior T wave abnormality. nonspecific  - Cxr: No acute cardiopulmonary process demonstrated  - Echo 10/7/22  * The left ventricle is normal in size with low normal systolic function.  The estimated ejection fraction is 50%.  Normal left ventricular diastolic function.  Normal right ventricular size.  Mild mitral regurgitation.  Mild tricuspid regurgitation.  Normal central venous pressure (3 mmHg).  The estimated PA systolic pressure is 30 mmHg.  There is left ventricular global hypokinesis.         Elevated brain natriuretic peptide (BNP) level  - No SOB on exam  - Echo pending  - Cxr: No acute cardiopulmonary process demonstrated    Severe anemia  10/7/2022  - H/H 7/25. Rec/d 1 unit PRBC.  - H/H 10/31  - Anemia workup per Hospitalist    Hyperlipidemia  10/7/2022  - Chronic  - Lipitor 20 mg po daily        VTE Risk Mitigation (From admission, onward)           Ordered     apixaban tablet 2.5 mg  2 times daily         10/07/22 0522     IP VTE HIGH RISK PATIENT  Once         10/06/22 1742     Place sequential compression device  Until discontinued         10/06/22 1742                    Thank you for your consult. I will sign off. Please contact us if you have any additional questions.    Jaciel Quinonez, DAYANAP  Cardiology   [facility] - Emergency Department

## 2022-10-07 NOTE — PROGRESS NOTES
Subjective:       Patient ID: Zbigniew Ventura is a 82 y.o. female.    Chief Complaint: Back Pain (No complaints at this time )    Patient is here today for a follow up evaluation. Patient blood pressure is increased today on intake, 105/57. Patient heart rate is increased today on intake, 100 bpm. Will order EKG STAT. Patient states when she exercises her heart rate does increase. Patient denies active chest pain.  Patient states she does have occasional abdominal pain. Recent urine reviewed, results normal. Patient is requesting flu shot. Will give flu shot today. Recent x-ray of back reviewed, results show arthritis. Recommended OTC Tylenol. Will order lab work today. Will follow with patient in 3 weeks.     Abnormal EKG, Will refer to ER for evaluation.       Current Medications:  No current facility-administered medications for this visit.    Current Outpatient Medications:     amLODIPine (NORVASC) 5 MG tablet, Take 1 tablet (5 mg total) by mouth once daily., Disp: 90 tablet, Rfl: 1    atorvastatin (LIPITOR) 20 MG tablet, Take 1 tablet (20 mg total) by mouth once daily., Disp: 90 tablet, Rfl: 1    FEROSUL 325 mg (65 mg iron) Tab tablet, TAKE ONE (1) TABLET BY MOUTH TWICE DAILY, Disp: 180 tablet, Rfl: 1    levETIRAcetam (KEPPRA) 500 MG Tab, Take 1 tablet (500 mg total) by mouth 2 (two) times daily., Disp: 180 tablet, Rfl: 1    meclizine (ANTIVERT) 25 MG tablet, TAKE ONE (1) TABLET BY MOUTH EVERY 12 HOURS AS NEEDED, Disp: 180 tablet, Rfl: 1    naproxen (NAPROSYN) 375 MG tablet, TAKE ONE (1) TABLET (375 MG TOTAL) BY MOUTH  TWICE DAILY WITH FOOD AS NEEDED FOR KNEE PAIN, Disp: 180 tablet, Rfl: 1    omeprazole (PRILOSEC) 20 MG capsule, Take 1 capsule (20 mg total) by mouth once daily., Disp: 90 capsule, Rfl: 1    pioglitazone (ACTOS) 30 MG tablet, Take 1 tablet (30 mg total) by mouth once daily., Disp: 90 tablet, Rfl: 1    Facility-Administered Medications Ordered in Other Visits:     acetaminophen tablet 1,000 mg, 1,000  mg, Oral, Q8H PRN, Colt Davis MD    acetaminophen tablet 650 mg, 650 mg, Oral, Q8H PRN, Colt Davis MD    acetaminophen tablet 650 mg, 650 mg, Oral, Q4H PRN, Colt Davis MD    aluminum-magnesium hydroxide-simethicone 200-200-20 mg/5 mL suspension 30 mL, 30 mL, Oral, QID PRN, Colt Davis MD    apixaban tablet 2.5 mg, 2.5 mg, Oral, BID, Colt Davis MD, 2.5 mg at 10/07/22 0613    atorvastatin tablet 20 mg, 20 mg, Oral, Daily, Colt Davis MD, 20 mg at 10/07/22 0827    dextrose 50% injection 12.5 g, 12.5 g, Intravenous, PRN, Colt Davis MD    dextrose 50% injection 25 g, 25 g, Intravenous, PRN, Colt Davis MD    diltiaZEM injection 10 mg, 10 mg, Intravenous, Once, Colt Davis MD    glucagon (human recombinant) injection 1 mg, 1 mg, Intramuscular, PRN, Colt Davis MD    insulin aspart U-100 injection 0-5 Units, 0-5 Units, Subcutaneous, QID (AC + HS) PRN, Colt Davis MD    levETIRAcetam tablet 500 mg, 500 mg, Oral, BID, Colt Davis MD, 500 mg at 10/07/22 0827    magnesium oxide tablet 800 mg, 800 mg, Oral, PRN, Colt Davis MD    magnesium oxide tablet 800 mg, 800 mg, Oral, PRN, Colt Davis MD    melatonin tablet 6 mg, 6 mg, Oral, Nightly PRN, Colt Davis MD    metoprolol tartrate (LOPRESSOR) tablet 25 mg, 25 mg, Oral, BID, Colt Davis MD, 25 mg at 10/07/22 0613    naloxone 0.4 mg/mL injection 0.02 mg, 0.02 mg, Intravenous, PRN, Colt Davis MD    ondansetron disintegrating tablet 8 mg, 8 mg, Oral, Q8H PRN, Colt Davis MD    pantoprazole EC tablet 40 mg, 40 mg, Oral, Daily, Colt Davis MD, 40 mg at 10/07/22 0827    polyethylene glycol packet 17 g, 17 g, Oral, Daily PRN, Colt Davis MD    potassium bicarbonate disintegrating tablet 35 mEq, 35 mEq, Oral, PRN, Colt Davis MD    potassium bicarbonate disintegrating tablet 50 mEq, 50 mEq,  Oral, PRN, Colt Davis MD    potassium bicarbonate disintegrating tablet 60 mEq, 60 mEq, Oral, PRN, Colt Davis MD    potassium, sodium phosphates 280-160-250 mg packet 2 packet, 2 packet, Oral, PRN, Colt Davis MD    potassium, sodium phosphates 280-160-250 mg packet 2 packet, 2 packet, Oral, PRN, Colt Davis MD    potassium, sodium phosphates 280-160-250 mg packet 2 packet, 2 packet, Oral, PRN, Colt Davis MD    sodium chloride 0.9% flush 10 mL, 10 mL, Intravenous, PRN, Colt Davis MD    Last Labs:     Admission on 10/06/2022   Component Date Value    Sodium 10/06/2022 131 (L)     Potassium 10/06/2022 4.1     Chloride 10/06/2022 96 (L)     CO2 10/06/2022 24     Anion Gap 10/06/2022 15     Glucose 10/06/2022 105     BUN 10/06/2022 17     Creatinine 10/06/2022 1.25 (H)     BUN/Creatinine Ratio 10/06/2022 14     Calcium 10/06/2022 9.0     Total Protein 10/06/2022 7.8     Albumin 10/06/2022 4.3     Globulin 10/06/2022 3.5     A/G Ratio 10/06/2022 1.2     Bilirubin, Total 10/06/2022 0.4     Alk Phos 10/06/2022 76     ALT 10/06/2022 25     AST 10/06/2022 27     eGFR 10/06/2022 44 (L)     Troponin I High Sensitiv* 10/06/2022 12.3     ProBNP 10/06/2022 4,032 (H)     WBC 10/06/2022 6.02     RBC 10/06/2022 2.50 (L)     Hemoglobin 10/06/2022 7.7 (L)     Hematocrit 10/06/2022 23.4 (L)     MCV 10/06/2022 93.6     MCH 10/06/2022 30.8     MCHC 10/06/2022 32.9     RDW 10/06/2022 12.8     Platelet Count 10/06/2022 293     MPV 10/06/2022 8.4 (L)     Neutrophils % 10/06/2022 66.4 (H)     Lymphocytes % 10/06/2022 25.6 (L)     Monocytes % 10/06/2022 4.8     Eosinophils % 10/06/2022 2.0     Basophils % 10/06/2022 0.7     Immature Granulocytes % 10/06/2022 0.5 (H)     nRBC, Auto 10/06/2022 0.0     Neutrophils, Abs 10/06/2022 4.00     Lymphocytes, Absolute 10/06/2022 1.54     Monocytes, Absolute 10/06/2022 0.29     Eosinophils, Absolute 10/06/2022 0.12     Basophils, Absolute  10/06/2022 0.04     Immature Granulocytes, A* 10/06/2022 0.03     nRBC, Absolute 10/06/2022 0.00     Diff Type 10/06/2022 Auto     UNIT NUMBER 10/06/2022 [id number]     UNIT ABO/RH 10/06/2022 A POS     DISPENSE STATUS 10/06/2022 Selected     Unit Expiration 10/06/2022 [id number]     Product Code 10/06/2022 [id number]     Unit Blood Type Code 10/06/2022 6200     CROSSMATCH INTERPRETATION 10/06/2022 Compatible     Group & Rh 10/06/2022 A POS     Indirect Raj 10/06/2022 NEG     Influenza A 10/06/2022 Negative     Influenza B 10/06/2022 Negative     COVID-19 Ag 10/06/2022 Negative     ABORH Retype 10/06/2022 A POS     BSA 10/07/2022 1.67     Right Atrial Pressure (f* 10/07/2022 3     EF 10/07/2022 50     Left Ventricular Outflow* 10/07/2022 1.00     AORTIC VALVE CUSP SEPERA* 10/07/2022 2.08     LVIDd 10/07/2022 4.27     IVS 10/07/2022 0.91     Posterior Wall 10/07/2022 0.99     Ao root annulus 10/07/2022 2.70     LVIDs 10/07/2022 3.31     FS 10/07/2022 22     IVC ostium 10/07/2022 1.0     LV mass 10/07/2022 131.25     LA size 10/07/2022 3.20     RVDD 10/07/2022 2.80     TAPSE 10/07/2022 1.60     Left Ventricle Relative * 10/07/2022 0.46     AV mean gradient 10/07/2022 2     AV valve area 10/07/2022 1.53     AV Velocity Ratio 10/07/2022 0.63     AV index (prosthetic) 10/07/2022 0.60     E wave deceleration time 10/07/2022 192.00     LVOT diameter 10/07/2022 1.80     LVOT area 10/07/2022 2.5     LVOT peak bk 10/07/2022 0.5     LVOT peak VTI 10/07/2022 9.00     Ao peak bk 10/07/2022 0.8     Ao VTI 10/07/2022 15.00     LVOT stroke volume 10/07/2022 22.89     AV peak gradient 10/07/2022 3     TV rest pulmonary artery* 10/07/2022 30     MV Peak E Bk 10/07/2022 0.69     TR Max Bk 10/07/2022 2.60     LV Systolic Volume 10/07/2022 44.50     LV Systolic Volume Index 10/07/2022 27.0     LV Diastolic Volume 10/07/2022 81.70     LV Diastolic Volume Index 10/07/2022 49.52     LV Mass Index 10/07/2022 80     Echo EF  Estimated 10/07/2022 50     RA [place] 10/07/2022 3.20     Triscuspid Valve Regurgi* 10/07/2022 27     POC Glucose 10/06/2022 103     Sodium 10/07/2022 138     Potassium 10/07/2022 4.2     Chloride 10/07/2022 106     CO2 10/07/2022 28     Anion Gap 10/07/2022 8     Glucose 10/07/2022 106     BUN 10/07/2022 15     Creatinine 10/07/2022 1.08 (H)     BUN/Creatinine Ratio 10/07/2022 14     Calcium 10/07/2022 9.2     eGFR 10/07/2022 52 (L)     Magnesium 10/07/2022 1.6 (L)     Ferritin 10/07/2022 132     Iron 10/07/2022 55     Iron Saturation 10/07/2022 19     TIBC 10/07/2022 288     D-Dimer 10/07/2022 0.46     WBC 10/07/2022 5.41     RBC 10/07/2022 3.40 (L)     Hemoglobin 10/07/2022 10.4 (L)     Hematocrit 10/07/2022 31.4 (L)     MCV 10/07/2022 92.4     MCH 10/07/2022 30.6     MCHC 10/07/2022 33.1     RDW 10/07/2022 12.9     Platelet Count 10/07/2022 301     MPV 10/07/2022 8.7 (L)     Neutrophils % 10/07/2022 58.9     Lymphocytes % 10/07/2022 30.3     Monocytes % 10/07/2022 7.6 (H)     Eosinophils % 10/07/2022 2.4     Basophils % 10/07/2022 0.6     Immature Granulocytes % 10/07/2022 0.2     nRBC, Auto 10/07/2022 0.0     Neutrophils, Abs 10/07/2022 3.19     Lymphocytes, Absolute 10/07/2022 1.64     Monocytes, Absolute 10/07/2022 0.41     Eosinophils, Absolute 10/07/2022 0.13     Basophils, Absolute 10/07/2022 0.03     Immature Granulocytes, A* 10/07/2022 0.01     nRBC, Absolute 10/07/2022 0.00     Diff Type 10/07/2022 Auto     POC Glucose 10/07/2022 113 (H)     Troponin I High Sensitiv* 10/07/2022 15.9     POC Glucose 10/07/2022 96    Office Visit on 10/06/2022   Component Date Value    SD Interval 10/06/2022 159     QRS Duration 10/06/2022 73     QT/QTc 10/06/2022 293/441     PRT Axes 10/06/2022 262/23/-27     Heart Rate 10/06/2022 136     Results 10/06/2022         Last Imaging:  Echo  · The left ventricle is normal in size with low normal systolic function.  · The estimated ejection fraction is 50%.  · Normal left  ventricular diastolic function.  · Normal right ventricular size.  · Mild mitral regurgitation.  · Mild tricuspid regurgitation.  · Normal central venous pressure (3 mmHg).  · The estimated PA systolic pressure is 30 mmHg.  · There is left ventricular global hypokinesis.            Review of Systems   Cardiovascular:  Positive for palpitations.   Gastrointestinal:  Positive for abdominal pain.   All other systems reviewed and are negative.      Objective:      Physical Exam  Vitals reviewed.   Constitutional:       Appearance: Normal appearance. She is normal weight.   Cardiovascular:      Rate and Rhythm: Regular rhythm. Tachycardia present.      Pulses: Normal pulses.      Heart sounds: Normal heart sounds.   Pulmonary:      Effort: Pulmonary effort is normal.      Breath sounds: Normal breath sounds.   Abdominal:      General: Abdomen is flat. Bowel sounds are normal.      Palpations: Abdomen is soft.   Musculoskeletal:         General: Normal range of motion.      Cervical back: Normal range of motion and neck supple.   Skin:     General: Skin is warm and dry.   Neurological:      General: No focal deficit present.      Mental Status: She is alert and oriented to person, place, and time. Mental status is at baseline.       Assessment:       1. Tachycardia  POCT EKG 12-LEAD (NOT FOR [facility] USE)      2. Seizure  levETIRAcetam (KEPPRA) 500 MG Tab      3. Other osteoarthritis of spine, thoracic region        4. Palpitation        5. Generalized abdominal pain             Plan:         Zbigniew was seen today for back pain.    Diagnoses and all orders for this visit:    Tachycardia  -     POCT EKG 12-LEAD (NOT FOR [facility] USE)    Seizure  -     levETIRAcetam (KEPPRA) 500 MG Tab; Take 1 tablet (500 mg total) by mouth 2 (two) times daily.    Other osteoarthritis of spine, thoracic region    Palpitation    Generalized abdominal pain    Other orders  -     amLODIPine (NORVASC) 5 MG tablet; Take 1 tablet (5 mg total) by mouth  once daily.  -     atorvastatin (LIPITOR) 20 MG tablet; Take 1 tablet (20 mg total) by mouth once daily.  -     FEROSUL 325 mg (65 mg iron) Tab tablet; TAKE ONE (1) TABLET BY MOUTH TWICE DAILY  -     meclizine (ANTIVERT) 25 MG tablet; TAKE ONE (1) TABLET BY MOUTH EVERY 12 HOURS AS NEEDED  -     naproxen (NAPROSYN) 375 MG tablet; TAKE ONE (1) TABLET (375 MG TOTAL) BY MOUTH  TWICE DAILY WITH FOOD AS NEEDED FOR KNEE PAIN  -     omeprazole (PRILOSEC) 20 MG capsule; Take 1 capsule (20 mg total) by mouth once daily.  -     pioglitazone (ACTOS) 30 MG tablet; Take 1 tablet (30 mg total) by mouth once daily.  -     Influenza - Quadrivalent (Adjuvanted)

## 2022-10-07 NOTE — ASSESSMENT & PLAN NOTE
BNP 4032  No shortness of breath or edema  May be related to atrial fibrillation   F/u echo     Results for orders placed during the hospital encounter of 10/06/22    Echo    Interpretation Summary  · The left ventricle is normal in size with low normal systolic function.  · The estimated ejection fraction is 50%.  · Normal left ventricular diastolic function.  · Normal right ventricular size.  · Mild mitral regurgitation.  · Mild tricuspid regurgitation.  · Normal central venous pressure (3 mmHg).  · The estimated PA systolic pressure is 30 mmHg.  · There is left ventricular global hypokinesis.

## 2022-10-07 NOTE — HPI
81 y/o female with a past medical history of HLD, HTN, SZ, DM 2, CVA right hemispheric (2 years ago), persistent dizziness admitted from [facility] ED complaining of palpitations. Originally presented to PCP and sent to ED and found to be in A- FIB/A-Flutter RVR. Reports occasional palpitations for the past month. Denies chest pain, SOB, nausea, vomiting, diaphoresis, diarrhea, syncope or recent illness. Per ED note  reports seizure 2 days ago.     ED Workup: H/H 7/25. Rec/d 1 unit PRBC. BNP >4000, Troponin 12, D-Dimer 0.48, Mg 1.6. EKG: Atrial flutter  with rapid ventricular response  with 2:1 A-V block. ST-T abnormality. Cardizem 10 mg iv, Lopressor 5 mg iv    Social history: No history of EOH or Tobacco use.

## 2022-10-07 NOTE — ASSESSMENT & PLAN NOTE
Patient with Paroxysmal (<7 days) atrial fibrillation which is uncontrolled currently with Calcium Channel Blocker. Patient is currently in atrial fibrillation.[id number] Score: 7. HASBLED Score: 4. Anticoagulation not indicated due to severe anemia and epigastric pain. Will give DVT prophylaxis.       TSH - normal     Results for orders placed during the hospital encounter of 10/06/22    Echo    Interpretation Summary  · The left ventricle is normal in size with low normal systolic function.  · The estimated ejection fraction is 50%.  · Normal left ventricular diastolic function.  · Normal right ventricular size.  · Mild mitral regurgitation.  · Mild tricuspid regurgitation.  · Normal central venous pressure (3 mmHg).  · The estimated PA systolic pressure is 30 mmHg.  · There is left ventricular global hypokinesis.                 She reported urinary frequency, urgency, small amount of blood in urine. Symptoms have slightly improved in the last 2 days.  No fevers or flank pain reported.  Urgent care notes state pt reported also a \"pinch\" in the RUQ on 1/10, intermittent and not present at time of eval.    Would you advise she come in today to be seen by you?

## 2022-10-07 NOTE — ASSESSMENT & PLAN NOTE
Patient's FSGs are controlled on current medication regimen.  Last A1c reviewed-   Lab Results   Component Value Date    HGBA1C 6.0 06/16/2021     Most recent fingerstick glucose reviewed- No results for input(s): POCTGLUCOSE in the last 24 hours.  Current correctional scale  Low  Maintain anti-hyperglycemic dose as follows-   Antihyperglycemics (From admission, onward)    Start     Stop Route Frequency Ordered    10/06/22 1838  insulin aspart U-100 injection 0-5 Units         -- SubQ Before meals & nightly PRN 10/06/22 1742        Hold Oral hypoglycemics while patient is in the hospital.

## 2022-10-07 NOTE — SUBJECTIVE & OBJECTIVE
Past Medical History:   Diagnosis Date    CVA (cerebral vascular accident)     Diabetes mellitus     Epilepsy, unspecified, not intractable, without status epilepticus     Hyperlipidemia     Hypertension        History reviewed. No pertinent surgical history.    Review of patient's allergies indicates:  No Known Allergies    No current facility-administered medications on file prior to encounter.     Current Outpatient Medications on File Prior to Encounter   Medication Sig    amLODIPine (NORVASC) 5 MG tablet Take 1 tablet (5 mg total) by mouth once daily.    atorvastatin (LIPITOR) 20 MG tablet Take 1 tablet (20 mg total) by mouth once daily.    FEROSUL 325 mg (65 mg iron) Tab tablet TAKE ONE (1) TABLET BY MOUTH TWICE DAILY    levETIRAcetam (KEPPRA) 500 MG Tab Take 1 tablet (500 mg total) by mouth 2 (two) times daily.    meclizine (ANTIVERT) 25 MG tablet TAKE ONE (1) TABLET BY MOUTH EVERY 12 HOURS AS NEEDED    naproxen (NAPROSYN) 375 MG tablet TAKE ONE (1) TABLET (375 MG TOTAL) BY MOUTH  TWICE DAILY WITH FOOD AS NEEDED FOR KNEE PAIN    omeprazole (PRILOSEC) 20 MG capsule Take 1 capsule (20 mg total) by mouth once daily.    pioglitazone (ACTOS) 30 MG tablet Take 1 tablet (30 mg total) by mouth once daily.     Family History    None       Tobacco Use    Smoking status: Never    Smokeless tobacco: Never   Substance and Sexual Activity    Alcohol use: Never    Drug use: Never    Sexual activity: Not on file     Review of Systems   Constitutional: Negative for diaphoresis.   Eyes:  Negative for vision loss in left eye, vision loss in right eye and visual disturbance.   Cardiovascular:  Positive for palpitations. Negative for chest pain, dyspnea on exertion, leg swelling, near-syncope, orthopnea and syncope.   Respiratory:  Negative for cough and shortness of breath.    All other systems reviewed and are negative.  Objective:     Vital Signs (Most Recent):  Temp: 98.2 °F (36.8 °C) (10/07/22 0617)  Pulse: (!) 58 (10/07/22  1410)  Resp: 13 (10/07/22 1410)  BP: 105/66 (10/07/22 1410)  SpO2: 100 % (10/07/22 1410)   Vital Signs (24h Range):  Temp:  [98.2 °F (36.8 °C)] 98.2 °F (36.8 °C)  Pulse:  [] 58  Resp:  [12-23] 13  SpO2:  [96 %-100 %] 100 %  BP: ()/(46-87) 105/66     Weight: 64 kg (141 lb)  Body mass index is 25.79 kg/m².    SpO2: 100 %         Intake/Output Summary (Last 24 hours) at 10/7/2022 1507  Last data filed at 10/7/2022 1129  Gross per 24 hour   Intake 50 ml   Output --   Net 50 ml       Lines/Drains/Airways       Peripheral Intravenous Line  Duration                  Peripheral IV - Single Lumen 10/06/22 1245 20 G Anterior;Distal;Left Forearm 1 day                    Physical Exam  Vitals reviewed.   Constitutional:       General: She is not in acute distress.     Appearance: Normal appearance.   HENT:      Head: Normocephalic and atraumatic.      Nose: Nose normal.      Mouth/Throat:      Mouth: Mucous membranes are moist.   Eyes:      Extraocular Movements: Extraocular movements intact.      Pupils: Pupils are equal, round, and reactive to light.   Cardiovascular:      Rate and Rhythm: Regular rhythm. Bradycardia present.      Pulses: Normal pulses.      Heart sounds: Normal heart sounds. No murmur heard.  Pulmonary:      Effort: Pulmonary effort is normal. No respiratory distress.      Breath sounds: Normal breath sounds.   Abdominal:      General: Bowel sounds are normal. There is no distension.      Palpations: Abdomen is soft.      Tenderness: There is no abdominal tenderness.   Musculoskeletal:         General: No swelling. Normal range of motion.      Cervical back: Normal range of motion and neck supple. No rigidity.   Skin:     General: Skin is warm and dry.      Capillary Refill: Capillary refill takes less than 2 seconds.   Neurological:      General: No focal deficit present.      Mental Status: She is alert and oriented to person, place, and time.      Cranial Nerves: No cranial nerve deficit.       Sensory: No sensory deficit.   Psychiatric:         Mood and Affect: Mood normal.       Significant Labs: All pertinent lab results from the last 24 hours have been reviewed.    Significant Imaging: EKG:

## 2022-10-10 ENCOUNTER — TELEPHONE (OUTPATIENT)
Dept: FAMILY MEDICINE | Facility: CLINIC | Age: 82
End: 2022-10-10

## 2022-10-10 LAB
ABO + RH BLD: NORMAL
BLD PROD TYP BPU: NORMAL
BLOOD UNIT EXPIRATION DATE: NORMAL
BLOOD UNIT TYPE CODE: 6200
CROSSMATCH INTERPRETATION: NORMAL
DISPENSE STATUS: NORMAL
UNIT NUMBER: NORMAL

## 2022-10-10 NOTE — TELEPHONE ENCOUNTER
Spoke with pt's daughter. She stated she has not spoke with pt since she discharged from the hospital. She reports pt will call her for any problems or needs. She reports pt lives with her . She stated per mother does not answer the phone because she does not understand English well. She reports her father speaks English pretty well but she usually assist with communication. She vu of all follow up appts and wrote them down. She stated she will make sure pt is aware. She states pt has transportation. Unable to review medications, daughter stated she was not at pt's home. She stated pt had all of her medications. Advised to take medications to all dr appts she vu. She denies any needs at this time.

## 2022-10-11 ENCOUNTER — OFFICE VISIT (OUTPATIENT)
Dept: CARDIOLOGY | Facility: CLINIC | Age: 82
End: 2022-10-11
Payer: MEDICARE

## 2022-10-11 VITALS
OXYGEN SATURATION: 97 % | BODY MASS INDEX: 25.49 KG/M2 | HEART RATE: 83 BPM | SYSTOLIC BLOOD PRESSURE: 120 MMHG | WEIGHT: 138.5 LBS | HEIGHT: 62 IN | DIASTOLIC BLOOD PRESSURE: 78 MMHG | RESPIRATION RATE: 18 BRPM

## 2022-10-11 DIAGNOSIS — I10 PRIMARY HYPERTENSION: Chronic | ICD-10-CM

## 2022-10-11 DIAGNOSIS — G81.94 LEFT HEMIPARESIS: Chronic | ICD-10-CM

## 2022-10-11 DIAGNOSIS — G40.909 NONINTRACTABLE EPILEPSY WITHOUT STATUS EPILEPTICUS, UNSPECIFIED EPILEPSY TYPE: Chronic | ICD-10-CM

## 2022-10-11 DIAGNOSIS — E78.5 HYPERLIPIDEMIA, UNSPECIFIED HYPERLIPIDEMIA TYPE: Chronic | ICD-10-CM

## 2022-10-11 DIAGNOSIS — I63.9 CEREBROVASCULAR ACCIDENT (CVA), UNSPECIFIED MECHANISM: Chronic | ICD-10-CM

## 2022-10-11 DIAGNOSIS — I48.92 ATRIAL FLUTTER, UNSPECIFIED TYPE: Chronic | ICD-10-CM

## 2022-10-11 DIAGNOSIS — I48.0 PAROXYSMAL ATRIAL FIBRILLATION: Primary | Chronic | ICD-10-CM

## 2022-10-11 DIAGNOSIS — E11.9 TYPE 2 DIABETES MELLITUS WITHOUT COMPLICATION, WITHOUT LONG-TERM CURRENT USE OF INSULIN: Chronic | ICD-10-CM

## 2022-10-11 PROCEDURE — 93005 ELECTROCARDIOGRAM TRACING: CPT | Mod: PBBFAC | Performed by: INTERNAL MEDICINE

## 2022-10-11 PROCEDURE — 93010 EKG 12-LEAD: ICD-10-PCS | Mod: S$PBB,,, | Performed by: INTERNAL MEDICINE

## 2022-10-11 PROCEDURE — 99214 OFFICE O/P EST MOD 30 MIN: CPT | Mod: PBBFAC | Performed by: INTERNAL MEDICINE

## 2022-10-11 PROCEDURE — 99203 OFFICE O/P NEW LOW 30 MIN: CPT | Mod: S$PBB,,, | Performed by: INTERNAL MEDICINE

## 2022-10-11 PROCEDURE — 93010 ELECTROCARDIOGRAM REPORT: CPT | Mod: S$PBB,,, | Performed by: INTERNAL MEDICINE

## 2022-10-11 PROCEDURE — 99203 PR OFFICE/OUTPT VISIT, NEW, LEVL III, 30-44 MIN: ICD-10-PCS | Mod: S$PBB,,, | Performed by: INTERNAL MEDICINE

## 2022-10-13 ENCOUNTER — OFFICE VISIT (OUTPATIENT)
Dept: FAMILY MEDICINE | Facility: CLINIC | Age: 82
End: 2022-10-13
Payer: MEDICARE

## 2022-10-13 VITALS
RESPIRATION RATE: 18 BRPM | TEMPERATURE: 97 F | WEIGHT: 141.19 LBS | SYSTOLIC BLOOD PRESSURE: 118 MMHG | BODY MASS INDEX: 25.98 KG/M2 | OXYGEN SATURATION: 98 % | DIASTOLIC BLOOD PRESSURE: 60 MMHG | HEART RATE: 75 BPM | HEIGHT: 62 IN

## 2022-10-13 DIAGNOSIS — E78.49 OTHER HYPERLIPIDEMIA: ICD-10-CM

## 2022-10-13 DIAGNOSIS — R56.9 SEIZURE: ICD-10-CM

## 2022-10-13 DIAGNOSIS — I48.91 ATRIAL FIBRILLATION, UNSPECIFIED TYPE: ICD-10-CM

## 2022-10-13 DIAGNOSIS — I10 PRIMARY HYPERTENSION: Primary | ICD-10-CM

## 2022-10-13 PROCEDURE — 99495 TCM SERVICES (MODERATE COMPLEXITY): ICD-10-PCS | Mod: ,,, | Performed by: INTERNAL MEDICINE

## 2022-10-13 PROCEDURE — 99495 TRANSJ CARE MGMT MOD F2F 14D: CPT | Mod: ,,, | Performed by: INTERNAL MEDICINE

## 2022-10-13 NOTE — PATIENT INSTRUCTIONS
Zbigniew was seen today for tachycardia.    Diagnoses and all orders for this visit:    Primary hypertension    Other hyperlipidemia    Seizure    Atrial fibrillation, unspecified type

## 2022-10-13 NOTE — PROGRESS NOTES
Subjective:       Patient ID: Zbigniew Ventura is a 82 y.o. female.    Chief Complaint: Tachycardia (Patient have no complaints for today )    Patient is here today for a follow up evaluation. Patient blood pressure is stable today on intake, 118/60. Patient has no new complaints. Patient states she did follow with the ER. Patient states she did follow with Cardiology. Will follow with patient in 6 weeks.   Hospital records reviewed in detail personally by me  Current Medications:    Current Outpatient Medications:     amLODIPine (NORVASC) 5 MG tablet, Take 1 tablet (5 mg total) by mouth once daily., Disp: 90 tablet, Rfl: 1    apixaban (ELIQUIS) 2.5 mg Tab, Take 1 tablet (2.5 mg total) by mouth 2 (two) times daily., Disp: 60 tablet, Rfl: 1    atorvastatin (LIPITOR) 20 MG tablet, Take 1 tablet (20 mg total) by mouth once daily., Disp: 90 tablet, Rfl: 1    FEROSUL 325 mg (65 mg iron) Tab tablet, TAKE ONE (1) TABLET BY MOUTH TWICE DAILY, Disp: 180 tablet, Rfl: 1    levETIRAcetam (KEPPRA) 500 MG Tab, Take 1 tablet (500 mg total) by mouth 2 (two) times daily., Disp: 180 tablet, Rfl: 1    meclizine (ANTIVERT) 25 MG tablet, TAKE ONE (1) TABLET BY MOUTH EVERY 12 HOURS AS NEEDED, Disp: 180 tablet, Rfl: 1    metoprolol succinate (TOPROL-XL) 25 MG 24 hr tablet, Take 0.5 tablets (12.5 mg total) by mouth 2 (two) times daily., Disp: 15 tablet, Rfl: 1    naproxen (NAPROSYN) 375 MG tablet, TAKE ONE (1) TABLET (375 MG TOTAL) BY MOUTH  TWICE DAILY WITH FOOD AS NEEDED FOR KNEE PAIN, Disp: 180 tablet, Rfl: 1    omeprazole (PRILOSEC) 20 MG capsule, Take 1 capsule (20 mg total) by mouth once daily., Disp: 90 capsule, Rfl: 1    pioglitazone (ACTOS) 30 MG tablet, Take 1 tablet (30 mg total) by mouth once daily., Disp: 90 tablet, Rfl: 1    Last Labs:     Admission on 10/06/2022, Discharged on 10/07/2022   Component Date Value    Sodium 10/06/2022 131 (L)     Potassium 10/06/2022 4.1     Chloride 10/06/2022 96 (L)     CO2 10/06/2022 24     Anion  Gap 10/06/2022 15     Glucose 10/06/2022 105     BUN 10/06/2022 17     Creatinine 10/06/2022 1.25 (H)     BUN/Creatinine Ratio 10/06/2022 14     Calcium 10/06/2022 9.0     Total Protein 10/06/2022 7.8     Albumin 10/06/2022 4.3     Globulin 10/06/2022 3.5     A/G Ratio 10/06/2022 1.2     Bilirubin, Total 10/06/2022 0.4     Alk Phos 10/06/2022 76     ALT 10/06/2022 25     AST 10/06/2022 27     eGFR 10/06/2022 44 (L)     Troponin I High Sensitiv* 10/06/2022 12.3     ProBNP 10/06/2022 4,032 (H)     WBC 10/06/2022 6.02     RBC 10/06/2022 2.50 (L)     Hemoglobin 10/06/2022 7.7 (L)     Hematocrit 10/06/2022 23.4 (L)     MCV 10/06/2022 93.6     MCH 10/06/2022 30.8     MCHC 10/06/2022 32.9     RDW 10/06/2022 12.8     Platelet Count 10/06/2022 293     MPV 10/06/2022 8.4 (L)     Neutrophils % 10/06/2022 66.4 (H)     Lymphocytes % 10/06/2022 25.6 (L)     Monocytes % 10/06/2022 4.8     Eosinophils % 10/06/2022 2.0     Basophils % 10/06/2022 0.7     Immature Granulocytes % 10/06/2022 0.5 (H)     nRBC, Auto 10/06/2022 0.0     Neutrophils, Abs 10/06/2022 4.00     Lymphocytes, Absolute 10/06/2022 1.54     Monocytes, Absolute 10/06/2022 0.29     Eosinophils, Absolute 10/06/2022 0.12     Basophils, Absolute 10/06/2022 0.04     Immature Granulocytes, A* 10/06/2022 0.03     nRBC, Absolute 10/06/2022 0.00     Diff Type 10/06/2022 Auto     UNIT NUMBER 10/06/2022 [id number]     UNIT ABO/RH 10/06/2022 A POS     DISPENSE STATUS 10/06/2022 Released     Unit Expiration 10/06/2022 [id number]     Product Code 10/06/2022 [id number]     Unit Blood Type Code 10/06/2022 6200     CROSSMATCH INTERPRETATION 10/06/2022 Compatible     Group & Rh 10/06/2022 A POS     Indirect Raj 10/06/2022 NEG     Influenza A 10/06/2022 Negative     Influenza B 10/06/2022 Negative     COVID-19 Ag 10/06/2022 Negative     ABORH Retype 10/06/2022 A POS     BSA 10/07/2022 1.67     Right Atrial Pressure (f* 10/07/2022 3     EF 10/07/2022 50     Left Ventricular  Outflow* 10/07/2022 1.00     AORTIC VALVE CUSP SEPERA* 10/07/2022 2.08     LVIDd 10/07/2022 4.27     IVS 10/07/2022 0.91     Posterior Wall 10/07/2022 0.99     Ao root annulus 10/07/2022 2.70     LVIDs 10/07/2022 3.31     FS 10/07/2022 22     IVC ostium 10/07/2022 1.0     LV mass 10/07/2022 131.25     LA size 10/07/2022 3.20     RVDD 10/07/2022 2.80     TAPSE 10/07/2022 1.60     Left Ventricle Relative * 10/07/2022 0.46     AV mean gradient 10/07/2022 2     AV valve area 10/07/2022 1.53     AV Velocity Ratio 10/07/2022 0.63     AV index (prosthetic) 10/07/2022 0.60     E wave deceleration time 10/07/2022 192.00     LVOT diameter 10/07/2022 1.80     LVOT area 10/07/2022 2.5     LVOT peak bk 10/07/2022 0.5     LVOT peak VTI 10/07/2022 9.00     Ao peak bk 10/07/2022 0.8     Ao VTI 10/07/2022 15.00     LVOT stroke volume 10/07/2022 22.89     AV peak gradient 10/07/2022 3     TV rest pulmonary artery* 10/07/2022 30     MV Peak E Bk 10/07/2022 0.69     TR Max Bk 10/07/2022 2.60     LV Systolic Volume 10/07/2022 44.50     LV Systolic Volume Index 10/07/2022 27.0     LV Diastolic Volume 10/07/2022 81.70     LV Diastolic Volume Index 10/07/2022 49.52     LV Mass Index 10/07/2022 80     Echo EF Estimated 10/07/2022 50     RA Major Ogilvie 10/07/2022 3.20     Triscuspid Valve Regurgi* 10/07/2022 27     POC Glucose 10/06/2022 103     Sodium 10/07/2022 138     Potassium 10/07/2022 4.2     Chloride 10/07/2022 106     CO2 10/07/2022 28     Anion Gap 10/07/2022 8     Glucose 10/07/2022 106     BUN 10/07/2022 15     Creatinine 10/07/2022 1.08 (H)     BUN/Creatinine Ratio 10/07/2022 14     Calcium 10/07/2022 9.2     eGFR 10/07/2022 52 (L)     Magnesium 10/07/2022 1.6 (L)     Ferritin 10/07/2022 132     Iron 10/07/2022 55     Iron Saturation 10/07/2022 19     TIBC 10/07/2022 288     D-Dimer 10/07/2022 0.46     WBC 10/07/2022 5.41     RBC 10/07/2022 3.40 (L)     Hemoglobin 10/07/2022 10.4 (L)     Hematocrit 10/07/2022 31.4 (L)     MCV  10/07/2022 92.4     MCH 10/07/2022 30.6     MCHC 10/07/2022 33.1     RDW 10/07/2022 12.9     Platelet Count 10/07/2022 301     MPV 10/07/2022 8.7 (L)     Neutrophils % 10/07/2022 58.9     Lymphocytes % 10/07/2022 30.3     Monocytes % 10/07/2022 7.6 (H)     Eosinophils % 10/07/2022 2.4     Basophils % 10/07/2022 0.6     Immature Granulocytes % 10/07/2022 0.2     nRBC, Auto 10/07/2022 0.0     Neutrophils, Abs 10/07/2022 3.19     Lymphocytes, Absolute 10/07/2022 1.64     Monocytes, Absolute 10/07/2022 0.41     Eosinophils, Absolute 10/07/2022 0.13     Basophils, Absolute 10/07/2022 0.03     Immature Granulocytes, A* 10/07/2022 0.01     nRBC, Absolute 10/07/2022 0.00     Diff Type 10/07/2022 Auto     POC Glucose 10/07/2022 113 (H)     Troponin I High Sensitiv* 10/07/2022 15.9     POC Glucose 10/07/2022 96    Office Visit on 10/06/2022   Component Date Value    IL Interval 10/06/2022 159     QRS Duration 10/06/2022 73     QT/QTc 10/06/2022 293/441     PRT Axes 10/06/2022 262/23/-27     Heart Rate 10/06/2022 136     Results 10/06/2022         Last Imaging:  Echo  · The left ventricle is normal in size with low normal systolic function.  · The estimated ejection fraction is 50%.  · Normal left ventricular diastolic function.  · Normal right ventricular size.  · Mild mitral regurgitation.  · Mild tricuspid regurgitation.  · Normal central venous pressure (3 mmHg).  · The estimated PA systolic pressure is 30 mmHg.  · There is left ventricular global hypokinesis.            Review of Systems   All other systems reviewed and are negative.      Objective:      Physical Exam  Vitals reviewed.   Constitutional:       Appearance: Normal appearance. She is normal weight.   Cardiovascular:      Rate and Rhythm: Normal rate and regular rhythm.      Pulses: Normal pulses.      Heart sounds: Normal heart sounds.   Pulmonary:      Effort: Pulmonary effort is normal.      Breath sounds: Normal breath sounds.   Abdominal:      General:  Abdomen is flat. Bowel sounds are normal.      Palpations: Abdomen is soft.   Musculoskeletal:         General: Normal range of motion.      Cervical back: Normal range of motion and neck supple.   Skin:     General: Skin is warm and dry.   Neurological:      General: No focal deficit present.      Mental Status: She is alert and oriented to person, place, and time. Mental status is at baseline.       Assessment:       1. Primary hypertension        2. Other hyperlipidemia        3. Seizure        4. Atrial fibrillation, unspecified type             Plan:         Zbigniew was seen today for tachycardia.    Diagnoses and all orders for this visit:    Primary hypertension    Other hyperlipidemia    Seizure    Atrial fibrillation, unspecified type

## 2022-10-17 PROBLEM — G40.909 NONINTRACTABLE EPILEPSY WITHOUT STATUS EPILEPTICUS: Chronic | Status: ACTIVE | Noted: 2022-10-17

## 2022-10-17 PROBLEM — I48.92 ATRIAL FLUTTER: Chronic | Status: ACTIVE | Noted: 2022-10-17

## 2022-10-17 NOTE — PROGRESS NOTES
[facility] note        DATE OF SERVICE: 10/17/2022       PCP: Farhad Culver MD      CHIEF COMPLAINT:   Chief Complaint   Patient presents with    Consult     Referred from ED for palpitations    Palpitations     Started a couple of days ago        HISTORY OF PRESENT ILLNESS:  Zbigniew Ventura is a 82 y.o. female with a PMH of   Past Medical History:   Diagnosis Date    CVA (cerebral vascular accident)     Diabetes mellitus     Epilepsy, unspecified, not intractable, without status epilepticus     Hyperlipidemia     Hypertension      who presents for   Chief Complaint   Patient presents with    Consult     Referred from ED for palpitations    Palpitations     Started a couple of days ago          Review of Systems: Review of Systems   Respiratory:  Negative for shortness of breath.    Cardiovascular:  Positive for palpitations. Negative for chest pain and leg swelling.   Neurological:  Negative for loss of consciousness.      PAST MEDICAL HISTORY:  Past Medical History:   Diagnosis Date    CVA (cerebral vascular accident)     Diabetes mellitus     Epilepsy, unspecified, not intractable, without status epilepticus     Hyperlipidemia     Hypertension        PAST SURGICAL HISTORY:  History reviewed. No pertinent surgical history.    SOCIAL HISTORY:  Social History     Socioeconomic History    Marital status:    Occupational History    Occupation: disabled   Tobacco Use    Smoking status: Never    Smokeless tobacco: Never   Substance and Sexual Activity    Alcohol use: Never    Drug use: Never       FAMILY HISTORY:  History reviewed. No pertinent family history.      ALLERGIES:  Review of patient's allergies indicates:  No Known Allergies     MEDICATIONS:    Current Outpatient Medications:     amLODIPine (NORVASC) 5 MG tablet, Take 1 tablet (5 mg total) by mouth once daily., Disp: 90 tablet, Rfl: 1    atorvastatin (LIPITOR) 20 MG tablet, Take 1 tablet (20 mg total) by mouth once daily., Disp: 90 tablet,  "Rfl: 1    FEROSUL 325 mg (65 mg iron) Tab tablet, TAKE ONE (1) TABLET BY MOUTH TWICE DAILY, Disp: 180 tablet, Rfl: 1    levETIRAcetam (KEPPRA) 500 MG Tab, Take 1 tablet (500 mg total) by mouth 2 (two) times daily., Disp: 180 tablet, Rfl: 1    meclizine (ANTIVERT) 25 MG tablet, TAKE ONE (1) TABLET BY MOUTH EVERY 12 HOURS AS NEEDED, Disp: 180 tablet, Rfl: 1    naproxen (NAPROSYN) 375 MG tablet, TAKE ONE (1) TABLET (375 MG TOTAL) BY MOUTH  TWICE DAILY WITH FOOD AS NEEDED FOR KNEE PAIN, Disp: 180 tablet, Rfl: 1    omeprazole (PRILOSEC) 20 MG capsule, Take 1 capsule (20 mg total) by mouth once daily., Disp: 90 capsule, Rfl: 1    pioglitazone (ACTOS) 30 MG tablet, Take 1 tablet (30 mg total) by mouth once daily., Disp: 90 tablet, Rfl: 1    apixaban (ELIQUIS) 5 mg Tab, Take 1 tablet (5 mg total) by mouth 2 (two) times daily., Disp: 60 tablet, Rfl: 6    metoprolol succinate (TOPROL-XL) 25 MG 24 hr tablet, Take 0.5 tablets (12.5 mg total) by mouth 2 (two) times daily., Disp: 15 tablet, Rfl: 1     PHYSICAL EXAM:  /78 (BP Location: Left arm, Patient Position: Sitting)   Pulse 83   Resp 18   Ht 5' 2" (1.575 m)   Wt 62.8 kg (138 lb 8 oz)   SpO2 97%   BMI 25.33 kg/m²   Wt Readings from Last 3 Encounters:   10/13/22 64 kg (141 lb 3.2 oz)   10/11/22 62.8 kg (138 lb 8 oz)   10/06/22 64 kg (141 lb)      Body mass index is 25.33 kg/m².    Physical Exam  Vitals reviewed.   Constitutional:       Appearance: Normal appearance.   HENT:      Head: Normocephalic and atraumatic.   Neck:      Vascular: No carotid bruit or JVD.   Cardiovascular:      Rate and Rhythm: Normal rate and regular rhythm.      Pulses: Normal pulses.           Radial pulses are 2+ on the right side and 2+ on the left side.      Heart sounds: Normal heart sounds.   Pulmonary:      Effort: Pulmonary effort is normal.      Breath sounds: Normal breath sounds.   Musculoskeletal:      Right lower leg: No edema.      Left lower leg: No edema.   Skin:     " General: Skin is warm and dry.   Neurological:      Mental Status: She is alert.     LABS REVIEWED:  Lab Results   Component Value Date    WBC 5.41 10/07/2022    RBC 3.40 (L) 10/07/2022    HGB 10.4 (L) 10/07/2022    HCT 31.4 (L) 10/07/2022    MCV 92.4 10/07/2022    MCH 30.6 10/07/2022    MCHC 33.1 10/07/2022    RDW 12.9 10/07/2022     10/07/2022    MPV 8.7 (L) 10/07/2022    NRBC 0.0 10/07/2022     Lab Results   Component Value Date     10/07/2022    K 4.2 10/07/2022     10/07/2022    CO2 28 10/07/2022    BUN 15 10/07/2022    MG 1.6 (L) 10/07/2022     Lab Results   Component Value Date    AST 27 10/06/2022    ALT 25 10/06/2022     Lab Results   Component Value Date     10/07/2022    HGBA1C 6.0 06/16/2021     Lab Results   Component Value Date    CHOL 147 06/16/2021    HDL 58 06/16/2021    TRIG 102 06/16/2021    CHOLHDL 2.5 06/16/2021       CARDIAC STUDIES REVIEWED:EKG: NSR, nonspecific T wave abn, 78 bpm.         ASSESSMENT:   Active Problem List with Overview Notes    Diagnosis Date Noted    Nonintractable epilepsy without status epilepticus 10/17/2022    Atrial flutter 10/17/2022    Atrial fibrillation with RVR 10/06/2022    Primary hypertension 10/06/2022    Hyperlipidemia 10/06/2022    Type 2 diabetes mellitus, without long-term current use of insulin 10/06/2022    Severe anemia 10/06/2022    Elevated brain natriuretic peptide (BNP) level 10/06/2022    Dizziness 09/13/2021    Seizure 05/10/2021    Cerebrovascular accident (CVA) 05/10/2021    Left hemiparesis 05/10/2021     VISIT DIAGNOSIS:  Paroxysmal atrial fibrillation    Primary hypertension  -     EKG 12-lead    Atrial flutter, unspecified type    Hyperlipidemia, unspecified hyperlipidemia type    Type 2 diabetes mellitus without complication, without long-term current use of insulin    Cerebrovascular accident (CVA), unspecified mechanism    Nonintractable epilepsy without status epilepticus, unspecified epilepsy type    Left  hemiparesis    Other orders  -     apixaban (ELIQUIS) 5 mg Tab; Take 1 tablet (5 mg total) by mouth 2 (two) times daily.  Dispense: 60 tablet; Refill: 6       PLAN:  Eliquis 5 mg one po bid     RTC 6 months.

## 2022-10-27 ENCOUNTER — OFFICE VISIT (OUTPATIENT)
Dept: NEUROLOGY | Facility: CLINIC | Age: 82
End: 2022-10-27
Payer: MEDICARE

## 2022-10-27 VITALS
BODY MASS INDEX: 25.95 KG/M2 | OXYGEN SATURATION: 98 % | WEIGHT: 141 LBS | SYSTOLIC BLOOD PRESSURE: 112 MMHG | DIASTOLIC BLOOD PRESSURE: 66 MMHG | HEART RATE: 68 BPM | HEIGHT: 62 IN | RESPIRATION RATE: 18 BRPM

## 2022-10-27 DIAGNOSIS — I63.9 CEREBROVASCULAR ACCIDENT (CVA), UNSPECIFIED MECHANISM: Primary | ICD-10-CM

## 2022-10-27 DIAGNOSIS — R42 DIZZINESS: ICD-10-CM

## 2022-10-27 DIAGNOSIS — R56.9 SEIZURE: Chronic | ICD-10-CM

## 2022-10-27 PROCEDURE — 99214 OFFICE O/P EST MOD 30 MIN: CPT | Mod: PBBFAC | Performed by: NURSE PRACTITIONER

## 2022-10-27 PROCEDURE — 99213 OFFICE O/P EST LOW 20 MIN: CPT | Mod: S$PBB,,, | Performed by: NURSE PRACTITIONER

## 2022-10-27 PROCEDURE — 99213 PR OFFICE/OUTPT VISIT, EST, LEVL III, 20-29 MIN: ICD-10-PCS | Mod: S$PBB,,, | Performed by: NURSE PRACTITIONER

## 2022-10-27 RX ORDER — LEVETIRACETAM 500 MG/1
500 TABLET ORAL 2 TIMES DAILY
Qty: 180 TABLET | Refills: 1 | Status: SHIPPED | OUTPATIENT
Start: 2022-10-27 | End: 2023-01-30 | Stop reason: SDUPTHER

## 2022-10-27 NOTE — PROGRESS NOTES
Subjective:       Patient ID: Zbigniew Ventura is a 82 y.o. female     Chief Complaint:    Chief Complaint   Patient presents with    Follow-up    Stroke          Allergies:  Patient has no known allergies.    Current Medications:    Outpatient Encounter Medications as of 10/27/2022   Medication Sig Dispense Refill    amLODIPine (NORVASC) 5 MG tablet Take 1 tablet (5 mg total) by mouth once daily. 90 tablet 1    apixaban (ELIQUIS) 5 mg Tab Take 1 tablet (5 mg total) by mouth 2 (two) times daily. 60 tablet 6    atorvastatin (LIPITOR) 20 MG tablet Take 1 tablet (20 mg total) by mouth once daily. 90 tablet 1    FEROSUL 325 mg (65 mg iron) Tab tablet TAKE ONE (1) TABLET BY MOUTH TWICE DAILY 180 tablet 1    meclizine (ANTIVERT) 25 MG tablet TAKE ONE (1) TABLET BY MOUTH EVERY 12 HOURS AS NEEDED 180 tablet 1    metoprolol succinate (TOPROL-XL) 25 MG 24 hr tablet Take 0.5 tablets (12.5 mg total) by mouth 2 (two) times daily. 15 tablet 1    naproxen (NAPROSYN) 375 MG tablet TAKE ONE (1) TABLET (375 MG TOTAL) BY MOUTH  TWICE DAILY WITH FOOD AS NEEDED FOR KNEE PAIN 180 tablet 1    omeprazole (PRILOSEC) 20 MG capsule Take 1 capsule (20 mg total) by mouth once daily. 90 capsule 1    pioglitazone (ACTOS) 30 MG tablet Take 1 tablet (30 mg total) by mouth once daily. 90 tablet 1    [DISCONTINUED] levETIRAcetam (KEPPRA) 500 MG Tab Take 1 tablet (500 mg total) by mouth 2 (two) times daily. 180 tablet 1    levETIRAcetam (KEPPRA) 500 MG Tab Take 1 tablet (500 mg total) by mouth 2 (two) times daily. 180 tablet 1    [DISCONTINUED] amLODIPine (NORVASC) 5 MG tablet Take 1 tablet (5 mg total) by mouth once daily. 90 tablet 1    [DISCONTINUED] apixaban (ELIQUIS) 2.5 mg Tab Take 1 tablet (2.5 mg total) by mouth 2 (two) times daily. 60 tablet 1    [DISCONTINUED] atorvastatin (LIPITOR) 20 MG tablet Take 1 tablet (20 mg total) by mouth once daily. 90 tablet 1    [DISCONTINUED] FEROSUL 325 mg (65 mg iron) Tab tablet TAKE ONE (1) TABLET BY MOUTH  TWICE DAILY 180 tablet 1    [DISCONTINUED] levETIRAcetam (KEPPRA) 500 MG Tab Take 1 tablet (500 mg total) by mouth 2 (two) times daily. 180 tablet 1    [DISCONTINUED] meclizine (ANTIVERT) 25 MG tablet TAKE ONE (1) TABLET BY MOUTH EVERY 12 HOURS AS NEEDED 180 tablet 1    [DISCONTINUED] naproxen (NAPROSYN) 375 MG tablet TAKE ONE (1) TABLET (375 MG TOTAL) BY MOUTH  TWICE DAILY WITH FOOD AS NEEDED FOR KNEE PAIN 180 tablet 1    [DISCONTINUED] omeprazole (PRILOSEC) 20 MG capsule Take 1 capsule (20 mg total) by mouth once daily. 90 capsule 1    [DISCONTINUED] pioglitazone (ACTOS) 30 MG tablet Take 1 tablet (30 mg total) by mouth once daily. 90 tablet 1    [DISCONTINUED] acetaminophen tablet 1,000 mg       [DISCONTINUED] acetaminophen tablet 650 mg       [DISCONTINUED] acetaminophen tablet 650 mg       [DISCONTINUED] aluminum-magnesium hydroxide-simethicone 200-200-20 mg/5 mL suspension 30 mL       [DISCONTINUED] atorvastatin tablet 20 mg       [DISCONTINUED] dextrose 50% injection 12.5 g       [DISCONTINUED] dextrose 50% injection 25 g       [DISCONTINUED] diltiaZEM injection 10 mg       [DISCONTINUED] enoxaparin injection 40 mg       [DISCONTINUED] glucagon (human recombinant) injection 1 mg       [DISCONTINUED] insulin aspart U-100 injection 0-5 Units       [DISCONTINUED] levETIRAcetam tablet 500 mg       [DISCONTINUED] magnesium oxide tablet 800 mg       [DISCONTINUED] magnesium oxide tablet 800 mg       [DISCONTINUED] melatonin tablet 6 mg       [DISCONTINUED] naloxone 0.4 mg/mL injection 0.02 mg       [DISCONTINUED] ondansetron disintegrating tablet 8 mg       [DISCONTINUED] pantoprazole EC tablet 40 mg       [DISCONTINUED] polyethylene glycol packet 17 g       [DISCONTINUED] potassium bicarbonate disintegrating tablet 35 mEq       [DISCONTINUED] potassium bicarbonate disintegrating tablet 50 mEq       [DISCONTINUED] potassium bicarbonate disintegrating tablet 60 mEq       [DISCONTINUED] potassium, sodium phosphates  280-160-250 mg packet 2 packet       [DISCONTINUED] potassium, sodium phosphates 280-160-250 mg packet 2 packet       [DISCONTINUED] potassium, sodium phosphates 280-160-250 mg packet 2 packet       [DISCONTINUED] sodium chloride 0.9% flush 10 mL        No facility-administered encounter medications on file as of 10/27/2022.       History of Present Illness  This Is a 82-year-old past history of diabetes and right hemispheric stroke and seizure.    Her family in room assists with communication, her .    They deny any seizures since her last visit with us and no new CVA symptoms.  She is on triple therapy including ASA 81mg as well as keppra 500mg BID which is filled by her primary care and denies any side effects.    She does have residual left-sided weakness from stroke but it is minimal. In     They do report that she has occasional episodes of dizziness, this since her prior CVA.  Her  has let her use his prescribed meclizine which worked very well and she inquires about her own prescription.         Review of Systems  Review of Systems   Constitutional:  Negative for diaphoresis and fever.   HENT:  Negative for congestion, hearing loss and tinnitus.    Eyes:  Negative for blurred vision, double vision, photophobia, discharge and redness.   Respiratory:  Negative for cough and shortness of breath.    Cardiovascular:  Negative for chest pain.   Gastrointestinal:  Negative for abdominal pain, nausea and vomiting.   Musculoskeletal:  Negative for back pain, joint pain, myalgias and neck pain.   Skin:  Negative for itching and rash.   Neurological:  Negative for dizziness, tremors, sensory change, speech change, focal weakness, seizures, loss of consciousness, weakness and headaches.   Psychiatric/Behavioral:  Negative for depression, hallucinations and memory loss. The patient does not have insomnia.    All other systems reviewed and are negative.   Objective:     NEUROLOGICAL EXAMINATION:     MENTAL  STATUS   Oriented to person, place, and time.   Registration: recalls 3 of 3 objects. Recall at 5 minutes: recalls 3 of 3 objects.   Attention: normal. Concentration: normal.   Speech: speech is normal   Level of consciousness: alert  Knowledge: good and consistent with education.   Normal comprehension.     CRANIAL NERVES     CN II   Visual fields full to confrontation.   Visual acuity: normal  Right visual field deficit: none  Left visual field deficit: none     CN III, IV, VI   Pupils are equal, round, and reactive to light.  Extraocular motions are normal.   Right pupil: Size: 3 mm. Shape: regular. Reactivity: brisk. Consensual response: intact. Accommodation: intact.   Left pupil: Size: 3 mm. Shape: regular. Reactivity: brisk. Consensual response: intact. Accommodation: intact.   CN III: no CN III palsy  CN VI: no CN VI palsy  Nystagmus: none   Diplopia: none  Upgaze: normal  Downgaze: normal  Conjugate gaze: present  Vestibulo-ocular reflex: present    CN V   Facial sensation intact.   Right facial sensation deficit: none  Left facial sensation deficit: none  Right corneal reflex: normal  Left corneal reflex: normal    CN VII   Facial expression full, symmetric.   Right facial weakness: none  Left facial weakness: none  Right taste: normal  Left taste: normal    CN VIII   CN VIII normal.   Hearing: intact    CN IX, X   CN IX normal.   CN X normal.   Palate: symmetric    CN XI   CN XI normal.   Right sternocleidomastoid strength: normal  Left sternocleidomastoid strength: normal  Right trapezius strength: normal  Left trapezius strength: normal    CN XII   CN XII normal.   Tongue: not atrophic  Fasciculations: absent  Tongue deviation: none    MOTOR EXAM   Muscle bulk: normal  Overall muscle tone: normal  Right arm tone: normal  Left arm tone: normal  Right arm pronator drift: absent  Left arm pronator drift: absent  Right leg tone: normal  Left leg tone: normal    Strength   Right neck flexion: 5/5  Left neck  flexion: 5/5  Right neck extension: 5  Left neck extension:   Right deltoid: 5/  Left deltoid:   Right biceps:   Left biceps:   Right triceps:   Left triceps:   Right wrist flexion:   Left wrist flexion:   Right wrist extension:   Left wrist extension:   Right interossei:   Left interossei:   Right iliopsoas:   Left iliopsoas:   Right quadriceps:   Left quadriceps:   Right hamstrin/5  Left hamstrin/5  Right anterior tibial:   Left anterior tibial:   Right posterior tibial:   Left posterior tibial:   Right gastroc:   Left gastroc:     REFLEXES     Reflexes   Right brachioradialis: 2+  Left brachioradialis: 2+  Right biceps: 2+  Left biceps: 2+  Right triceps: 2+  Left triceps: 2+  Right patellar: 2+  Left patellar: 2+  Right achilles: 2+  Left achilles: 2+  Right plantar: normal  Left plantar: normal  Right Calvin: absent  Left Calvin: absent  Right ankle clonus: absent  Left ankle clonus: absent  Right pendular knee jerk: absent  Left pendular knee jerk: absent    SENSORY EXAM   Light touch normal.   Right arm light touch: normal  Left arm light touch: normal  Right leg light touch: normal  Left leg light touch: normal  Vibration normal.   Right arm vibration: normal  Left arm vibration: normal  Right leg vibration: normal  Left leg vibration: normal  Proprioception normal.   Right arm proprioception: normal  Left arm proprioception: normal  Right leg proprioception: normal  Left leg proprioception: normal  Pinprick normal.   Right arm pinprick: normal  Left arm pinprick: normal  Right leg pinprick: normal  Left leg pinprick: normal  Graphesthesia: normal  Romberg: negative  Stereognosis: normal    GAIT AND COORDINATION     Gait  Gait: normal     Coordination   Finger to nose coordination: normal  Heel to shin coordination: normal  Tandem walking coordination: normal    Tremor   Resting tremor: absent  Intention tremor: absent  Action  tremor: absent     Physical Exam  Vitals and nursing note reviewed.   Constitutional:       Appearance: Normal appearance.   HENT:      Head: Normocephalic.   Eyes:      Extraocular Movements: Extraocular movements intact and EOM normal.      Pupils: Pupils are equal, round, and reactive to light.   Cardiovascular:      Rate and Rhythm: Normal rate and regular rhythm.   Pulmonary:      Effort: Pulmonary effort is normal.      Breath sounds: Normal breath sounds.   Musculoskeletal:         General: No swelling or tenderness. Normal range of motion.      Cervical back: Normal range of motion and neck supple.      Right lower leg: No edema.      Left lower leg: No edema.   Skin:     General: Skin is warm and dry.      Coloration: Skin is not jaundiced.      Findings: No rash.   Neurological:      General: No focal deficit present.      Mental Status: She is alert and oriented to person, place, and time.      GCS: GCS eye subscore is 4. GCS verbal subscore is 5. GCS motor subscore is 6.      Cranial Nerves: No cranial nerve deficit.      Sensory: No sensory deficit.      Motor: Motor function is intact. No weakness.      Coordination: Coordination is intact. Coordination normal. Finger-Nose-Finger Test, Heel to Shin Test and Romberg Test normal.      Gait: Gait is intact. Gait and tandem walk normal.      Deep Tendon Reflexes: Reflexes normal.      Reflex Scores:       Tricep reflexes are 2+ on the right side and 2+ on the left side.       Bicep reflexes are 2+ on the right side and 2+ on the left side.       Brachioradialis reflexes are 2+ on the right side and 2+ on the left side.       Patellar reflexes are 2+ on the right side and 2+ on the left side.       Achilles reflexes are 2+ on the right side and 2+ on the left side.  Psychiatric:         Mood and Affect: Mood normal.         Speech: Speech normal.         Behavior: Behavior normal.        Assessment:     Problem List Items Addressed This Visit          Neuro     Seizure (Chronic)    Relevant Medications    levETIRAcetam (KEPPRA) 500 MG Tab    Cerebrovascular accident (CVA) - Primary (Chronic)       Other    Dizziness        Primary Diagnosis and ICD10  Cerebrovascular accident (CVA), unspecified mechanism [I63.9]    Plan:     Patient Instructions   1.  Continue current medication including keppra as directed    2. Notify clinic if any seizures    3. Antivert as needed for dizziness    4. Naproxen as directed as needed for lower back pain    Seizure Precautions:  1. Take medications as directed  2. Avoid open flames and hot surfaces such as fires and grills  3. Avoid elevations such as ladders or stools  4. Avoid swimming alone  5. Make sure getting plenty of sleep, recommend 7-8 hours per day  6. Avoid alcohol and illicit drugs  7. No driving or operating heavy machinery for 6 months after last known seizure    Stroke risk modifiers:    1. Good sleep habits, recommend at least 7 hours total sleep daily  2. Continue management of blood pressure and cholesterol including medications, exercise, and good eating habits  3. Exercise as much as possible, recommend 5 days of the week for 30 minutes each day  4. Avoid smoking and alcohol  5. Go to the nearest emergency department immediately if any new or worsening weakness, speech difficulty, or numbness    Medications Discontinued During This Encounter   Medication Reason    levETIRAcetam (KEPPRA) 500 MG Tab Reorder         Requested Prescriptions     Signed Prescriptions Disp Refills    levETIRAcetam (KEPPRA) 500 MG Tab 180 tablet 1     Sig: Take 1 tablet (500 mg total) by mouth 2 (two) times daily.       No orders of the defined types were placed in this encounter.

## 2022-10-31 NOTE — PROGRESS NOTES
[facility]     PATIENT NAME: Zbigniew Ventura   : 1940    AGE: 82 y.o. DATE: 2022   MRN: [id number]        Reason for Visit / Chief Complaint: Medicare AWV (Initial Medicare AWV visit )        Zbigniew Ventura presents for an Initial Medicare AWV today. She is an established pt of Dr. Culver. Her  is present during the visit to assist with translation. Both denies recent seizure activity or transient weakness.     Review of Systems   Constitutional:  Negative for fever, malaise/fatigue and weight loss.   HENT:  Negative for congestion, hearing loss and sore throat.    Eyes:  Negative for blurred vision.   Respiratory:  Negative for cough and shortness of breath.    Cardiovascular:  Negative for chest pain, palpitations and leg swelling.   Gastrointestinal:  Negative for abdominal pain and nausea.   Genitourinary:  Negative for dysuria and flank pain.   Musculoskeletal:  Negative for falls.   Neurological:  Negative for dizziness, weakness and headaches.   Psychiatric/Behavioral:  Negative for depression. The patient is not nervous/anxious.       The following components were reviewed and updated:      Medical/Social/Family History:  Past Medical History:   Diagnosis Date    CVA (cerebral vascular accident)     Diabetes mellitus     Epilepsy, unspecified, not intractable, without status epilepticus     Hyperlipidemia     Hypertension         History reviewed. No pertinent family history.     History reviewed. No pertinent surgical history.    Social History     Tobacco Use   Smoking Status Never   Smokeless Tobacco Never       Social History     Substance and Sexual Activity   Alcohol Use Never         Allergies and Current Medications   Review of patient's allergies indicates:  No Known Allergies    Current Outpatient Medications:     amLODIPine (NORVASC) 5 MG tablet, Take 1 tablet (5 mg total) by mouth once daily., Disp: 90 tablet, Rfl: 1    apixaban (ELIQUIS) 5  mg Tab, Take 1 tablet (5 mg total) by mouth 2 (two) times daily., Disp: 60 tablet, Rfl: 6    atorvastatin (LIPITOR) 20 MG tablet, Take 1 tablet (20 mg total) by mouth once daily., Disp: 90 tablet, Rfl: 1    FEROSUL 325 mg (65 mg iron) Tab tablet, TAKE ONE (1) TABLET BY MOUTH TWICE DAILY, Disp: 180 tablet, Rfl: 1    levETIRAcetam (KEPPRA) 500 MG Tab, Take 1 tablet (500 mg total) by mouth 2 (two) times daily., Disp: 180 tablet, Rfl: 1    meclizine (ANTIVERT) 25 MG tablet, TAKE ONE (1) TABLET BY MOUTH EVERY 12 HOURS AS NEEDED, Disp: 180 tablet, Rfl: 1    naproxen (NAPROSYN) 375 MG tablet, TAKE ONE (1) TABLET (375 MG TOTAL) BY MOUTH  TWICE DAILY WITH FOOD AS NEEDED FOR KNEE PAIN, Disp: 180 tablet, Rfl: 1    omeprazole (PRILOSEC) 20 MG capsule, Take 1 capsule (20 mg total) by mouth once daily., Disp: 90 capsule, Rfl: 1    pioglitazone (ACTOS) 30 MG tablet, Take 1 tablet (30 mg total) by mouth once daily., Disp: 90 tablet, Rfl: 1    metoprolol succinate (TOPROL-XL) 25 MG 24 hr tablet, Take 0.5 tablets (12.5 mg total) by mouth 2 (two) times daily. (Patient not taking: Reported on 11/7/2022), Disp: 15 tablet, Rfl: 1      Health Risk Assessment   Fall Risk:  not at risk   Obesity: BMI Body mass index is 25.97 kg/m².   Advance Directive: no but information given   Depression: PHQ9- 5   HTN:  DASH diet, exercise, weight management, med compliance, home BP monitoring, and follow-up discussed.   T2DM: no  STI: not at risk   Statin Use: yes      Health Maintenance   Last eye exam: referral sent   Last CV screen with lipids: drawn this visit   Diabetes screening with fasting glucose or A1c: 10/7/22   Colonoscopy: colorguard ordered   Flu Vaccine: 10/6/22   Pneumonia vaccines: declined   COVID vaccine: IRON to [facility]   Hep B vaccine: na   DEXA: declined   Last pap/pelvic: advanced age -declined  Last Mammogram: declined  AAA screening: na   HIV Screening: not at risk  Hepatitis C Screen: drawn this visit  Low Dose CT Scan:  na    The patient has no Health Maintenance topics of status Not Due  Health Maintenance Due   Topic Date Due    COVID-19 Vaccine (1) Never done    Diabetes Urine Screening  Never done    Eye Exam  Never done    TETANUS VACCINE  Never done    Shingles Vaccine (1 of 2) Never done    Hemoglobin A1c  12/16/2021    Lipid Panel  06/16/2022         Lab results available in Epic or see dates from [facility] above:   Lab Results   Component Value Date    CHOL 147 06/16/2021    CHOL 167 06/29/2020     Lab Results   Component Value Date    HDL 58 06/16/2021    HDL 47 06/29/2020     Lab Results   Component Value Date    LDLCALC 69 06/16/2021    LDLCALC 95 06/29/2020     Lab Results   Component Value Date    TRIG 102 06/16/2021    TRIG 126 06/29/2020     Lab Results   Component Value Date    CHOLHDL 2.5 06/16/2021       Lab Results   Component Value Date    HGBA1C 6.0 06/16/2021       Sodium   Date Value Ref Range Status   10/07/2022 138 136 - 145 mmol/L Final     Potassium   Date Value Ref Range Status   10/07/2022 4.2 3.5 - 5.1 mmol/L Final     Chloride   Date Value Ref Range Status   10/07/2022 106 98 - 107 mmol/L Final     CO2   Date Value Ref Range Status   10/07/2022 28 21 - 32 mmol/L Final     Glucose   Date Value Ref Range Status   10/07/2022 106 74 - 106 mg/dL Final     BUN   Date Value Ref Range Status   10/07/2022 15 7 - 18 mg/dL Final     Creatinine   Date Value Ref Range Status   10/07/2022 1.08 (H) 0.55 - 1.02 mg/dL Final     Calcium   Date Value Ref Range Status   10/07/2022 9.2 8.5 - 10.1 mg/dL Final     Total Protein   Date Value Ref Range Status   10/06/2022 7.8 6.4 - 8.2 g/dL Final     Albumin   Date Value Ref Range Status   10/06/2022 4.3 3.5 - 5.0 g/dL Final     Bilirubin, Total   Date Value Ref Range Status   10/06/2022 0.4 >0.0 - 1.2 mg/dL Final     Alk Phos   Date Value Ref Range Status   10/06/2022 76 55 - 142 U/L Final     AST   Date Value Ref Range Status   10/06/2022 27 15 - 37 U/L Final     ALT  "  Date Value Ref Range Status   10/06/2022 25 13 - 56 U/L Final     Anion Gap   Date Value Ref Range Status   10/07/2022 8 7 - 16 mmol/L Final     eGFR    Date Value Ref Range Status   06/29/2020 43       Comment:     The above 19 analytes were performed by [facility] [id number] [place] [address],[place] [id number]     eGFR   Date Value Ref Range Status   06/21/2022 54 (L) >=60 mL/min/1.73m² Final         Incontinence  Bowel: no  Bladder: no      Care Team  Dr. Culver -PCP                 Dr. Alcazar-cardiology                 FRANCINE Baker FNP -neurology    **See Completed Assessments for Annual Wellness visit within the encounter summary    The following assessments were completed & reviewed:  Depression Screening  Cognitive function Screening  Timed Get Up Test  Whisper Test  Vision Screen  Health Risk Assessment  Checklist of ADLs and IADLs        Objective  Vitals:    11/07/22 1032   BP: 118/62   Pulse: 68   Resp: 16   Temp: 98.6 °F (37 °C)   TempSrc: Oral   SpO2: 97%   Weight: 64.4 kg (142 lb)   Height: 5' 2" (1.575 m)   PainSc:   3   PainLoc: Back      Body mass index is 25.97 kg/m².  Ideal body weight: 50.1 kg (110 lb 7.2 oz)       Physical Exam  Constitutional:       General: She is not in acute distress.     Appearance: Normal appearance.   HENT:      Head: Normocephalic.      Mouth/Throat:      Mouth: Mucous membranes are moist.   Eyes:      Conjunctiva/sclera: Conjunctivae normal.   Cardiovascular:      Rate and Rhythm: Normal rate and regular rhythm.      Pulses: Normal pulses.      Heart sounds: Normal heart sounds.   Pulmonary:      Effort: Pulmonary effort is normal. No respiratory distress.      Breath sounds: Normal breath sounds.   Abdominal:      Palpations: Abdomen is soft.      Tenderness: There is no abdominal tenderness.   Musculoskeletal:         General: Normal range of motion.      Cervical back: Neck supple.   Skin:     General: Skin is warm and dry.   Neurological:      General: " No focal deficit present.      Mental Status: She is alert and oriented to person, place, and time.      Motor: No weakness.   Psychiatric:         Mood and Affect: Mood normal.         Behavior: Behavior normal.       Assessment:     1. Primary hypertension    2. Paroxysmal atrial fibrillation    3. Cerebrovascular accident (CVA), unspecified mechanism    4. Seizure    5. Hyperlipidemia, unspecified hyperlipidemia type    6. Type 2 diabetes mellitus without complication, without long-term current use of insulin    7. Refraction disorder    8. Screening for colon cancer    9. Screening for viral disease         Plan:    Referrals:   Declined BMD  Referral for Cologuard     Advised to call office if does not hear from anyone with referral appt within 2-3 weeks to check on status of referral. Voiced understanding.      Discussed and provided with a screening schedule and personal prevention plan in accordance with USPSTF age appropriate recommendations and Medicare screening guidelines.   Education, counseling, and referrals were provided as needed.  After Visit Summary printed and given to patient which includes written education and a list of any referrals if indicated.     Education including diet, exercise, falls, prevenative health care for older adults and advanced directives discussed with patient and patient verbalized understanding.      F/u plan for yearly AWV.    Signature: Karla Morgan [facility]

## 2022-11-07 ENCOUNTER — OFFICE VISIT (OUTPATIENT)
Dept: FAMILY MEDICINE | Facility: CLINIC | Age: 82
End: 2022-11-07
Payer: MEDICARE

## 2022-11-07 VITALS
HEART RATE: 68 BPM | RESPIRATION RATE: 16 BRPM | SYSTOLIC BLOOD PRESSURE: 118 MMHG | WEIGHT: 142 LBS | HEIGHT: 62 IN | TEMPERATURE: 99 F | OXYGEN SATURATION: 97 % | BODY MASS INDEX: 26.13 KG/M2 | DIASTOLIC BLOOD PRESSURE: 62 MMHG

## 2022-11-07 DIAGNOSIS — Z00.00 ENCOUNTER FOR INITIAL ANNUAL WELLNESS VISIT (AWV) IN MEDICARE PATIENT: Primary | ICD-10-CM

## 2022-11-07 DIAGNOSIS — I10 PRIMARY HYPERTENSION: ICD-10-CM

## 2022-11-07 DIAGNOSIS — R56.9 SEIZURE: Chronic | ICD-10-CM

## 2022-11-07 DIAGNOSIS — Z12.11 SCREENING FOR COLON CANCER: ICD-10-CM

## 2022-11-07 DIAGNOSIS — Z11.59 SCREENING FOR VIRAL DISEASE: ICD-10-CM

## 2022-11-07 DIAGNOSIS — I63.9 CEREBROVASCULAR ACCIDENT (CVA), UNSPECIFIED MECHANISM: Chronic | ICD-10-CM

## 2022-11-07 DIAGNOSIS — E11.9 TYPE 2 DIABETES MELLITUS WITHOUT COMPLICATION, WITHOUT LONG-TERM CURRENT USE OF INSULIN: ICD-10-CM

## 2022-11-07 DIAGNOSIS — E78.5 HYPERLIPIDEMIA, UNSPECIFIED HYPERLIPIDEMIA TYPE: ICD-10-CM

## 2022-11-07 DIAGNOSIS — H52.7 REFRACTION DISORDER: ICD-10-CM

## 2022-11-07 DIAGNOSIS — I48.0 PAROXYSMAL ATRIAL FIBRILLATION: ICD-10-CM

## 2022-11-07 LAB
CHOLEST SERPL-MCNC: 152 MG/DL (ref 0–200)
CHOLEST/HDLC SERPL: 3.2 {RATIO}
CREAT UR-MCNC: 52 MG/DL (ref 28–219)
EST. AVERAGE GLUCOSE BLD GHB EST-MCNC: 100 MG/DL
HBA1C MFR BLD HPLC: 5.6 % (ref 4.5–6.6)
HCV AB SER QL: NORMAL
HDLC SERPL-MCNC: 47 MG/DL (ref 40–60)
LDLC SERPL CALC-MCNC: 66 MG/DL
LDLC/HDLC SERPL: 1.4 {RATIO}
MICROALBUMIN UR-MCNC: 1.1 MG/DL (ref 0–2.8)
MICROALBUMIN/CREAT RATIO PNL UR: 21.2 MG/G (ref 0–30)
NONHDLC SERPL-MCNC: 105 MG/DL
TRIGL SERPL-MCNC: 193 MG/DL (ref 35–150)
VLDLC SERPL-MCNC: 39 MG/DL

## 2022-11-07 PROCEDURE — 86803 HEPATITIS C AB TEST: CPT | Mod: ,,, | Performed by: CLINICAL MEDICAL LABORATORY

## 2022-11-07 PROCEDURE — 80061 LIPID PANEL: ICD-10-PCS | Mod: ,,, | Performed by: CLINICAL MEDICAL LABORATORY

## 2022-11-07 PROCEDURE — 83036 HEMOGLOBIN A1C: ICD-10-PCS | Mod: ,,, | Performed by: CLINICAL MEDICAL LABORATORY

## 2022-11-07 PROCEDURE — 80061 LIPID PANEL: CPT | Mod: ,,, | Performed by: CLINICAL MEDICAL LABORATORY

## 2022-11-07 PROCEDURE — 83036 HEMOGLOBIN GLYCOSYLATED A1C: CPT | Mod: ,,, | Performed by: CLINICAL MEDICAL LABORATORY

## 2022-11-07 PROCEDURE — 86803 HEPATITIS C ANTIBODY: ICD-10-PCS | Mod: ,,, | Performed by: CLINICAL MEDICAL LABORATORY

## 2022-11-07 PROCEDURE — 82570 ASSAY OF URINE CREATININE: CPT | Mod: ,,, | Performed by: CLINICAL MEDICAL LABORATORY

## 2022-11-07 PROCEDURE — 82570 MICROALBUMIN / CREATININE RATIO URINE: ICD-10-PCS | Mod: ,,, | Performed by: CLINICAL MEDICAL LABORATORY

## 2022-11-07 PROCEDURE — G0438 PR WELCOME MEDICARE ANNUAL WELLNESS INITIAL VISIT: ICD-10-PCS | Mod: ,,, | Performed by: NURSE PRACTITIONER

## 2022-11-07 PROCEDURE — G0438 PPPS, INITIAL VISIT: HCPCS | Mod: ,,, | Performed by: NURSE PRACTITIONER

## 2022-11-07 PROCEDURE — 82043 UR ALBUMIN QUANTITATIVE: CPT | Mod: ,,, | Performed by: CLINICAL MEDICAL LABORATORY

## 2022-11-07 PROCEDURE — 82043 MICROALBUMIN / CREATININE RATIO URINE: ICD-10-PCS | Mod: ,,, | Performed by: CLINICAL MEDICAL LABORATORY

## 2022-11-07 NOTE — PATIENT INSTRUCTIONS
Counseling and Referral of Other Preventative  (Italic type indicates deductible and co-insurance are waived)    Patient Name: Zbigniew Ventura  Today's Date: 11/7/2022    Health Maintenance         Date Due Completion Date    COVID-19 Vaccine (1) Never done ---IRON to [facility]    Diabetes Urine Screening Never done ---done this visit    Pneumococcal Vaccines (Age 65+) (1 - PCV) Never done ---declined    Eye Exam Never done ---referral sent    TETANUS VACCINE Never done ---    DEXA Scan Never done ---declined    Shingles Vaccine (1 of 2) Never done ---    Hemoglobin A1c 12/16/2021 6/16/2021    Lipid Panel 06/16/2022 6/16/2021          No orders of the defined types were placed in this encounter.

## 2022-11-09 DIAGNOSIS — Z71.89 COMPLEX CARE COORDINATION: ICD-10-CM

## 2022-11-22 LAB — NONINV COLON CA DNA+OCC BLD SCRN STL QL: NEGATIVE

## 2022-11-27 ENCOUNTER — HOSPITAL ENCOUNTER (EMERGENCY)
Facility: HOSPITAL | Age: 82
Discharge: HOME | End: 2022-11-27
Attending: EMERGENCY MEDICINE
Payer: MEDICARE

## 2022-11-27 VITALS
TEMPERATURE: 98 F | WEIGHT: 139 LBS | RESPIRATION RATE: 16 BRPM | OXYGEN SATURATION: 98 % | SYSTOLIC BLOOD PRESSURE: 142 MMHG | BODY MASS INDEX: 25.58 KG/M2 | HEIGHT: 62 IN | HEART RATE: 78 BPM | DIASTOLIC BLOOD PRESSURE: 72 MMHG

## 2022-11-27 DIAGNOSIS — A08.4 VIRAL GASTROENTERITIS: Primary | ICD-10-CM

## 2022-11-27 LAB
ALBUMIN SERPL BCP-MCNC: 3.9 G/DL (ref 3.5–5)
ALBUMIN/GLOB SERPL: 1.2 {RATIO}
ALP SERPL-CCNC: 70 U/L (ref 55–142)
ALT SERPL W P-5'-P-CCNC: 18 U/L (ref 13–56)
ANION GAP SERPL CALCULATED.3IONS-SCNC: 16 MMOL/L (ref 7–16)
AST SERPL W P-5'-P-CCNC: 21 U/L (ref 15–37)
BASOPHILS # BLD AUTO: 0.04 K/UL (ref 0–0.2)
BASOPHILS NFR BLD AUTO: 0.7 % (ref 0–1)
BILIRUB SERPL-MCNC: 0.5 MG/DL (ref ?–1.2)
BUN SERPL-MCNC: 16 MG/DL (ref 7–18)
BUN/CREAT SERPL: 11 (ref 6–20)
CALCIUM SERPL-MCNC: 8.7 MG/DL (ref 8.5–10.1)
CHLORIDE SERPL-SCNC: 99 MMOL/L (ref 98–107)
CO2 SERPL-SCNC: 22 MMOL/L (ref 21–32)
CREAT SERPL-MCNC: 1.47 MG/DL (ref 0.55–1.02)
DIFFERENTIAL METHOD BLD: ABNORMAL
EGFR (NO RACE VARIABLE) (RUSH/TITUS): 36 ML/MIN/1.73M²
EOSINOPHIL # BLD AUTO: 0.15 K/UL (ref 0–0.5)
EOSINOPHIL NFR BLD AUTO: 2.5 % (ref 1–4)
ERYTHROCYTE [DISTWIDTH] IN BLOOD BY AUTOMATED COUNT: 12.7 % (ref 11.5–14.5)
GLOBULIN SER-MCNC: 3.2 G/DL (ref 2–4)
GLUCOSE SERPL-MCNC: 125 MG/DL (ref 74–106)
HCT VFR BLD AUTO: 28.4 % (ref 38–47)
HGB BLD-MCNC: 9 G/DL (ref 12–16)
IMM GRANULOCYTES # BLD AUTO: 0.02 K/UL (ref 0–0.04)
IMM GRANULOCYTES NFR BLD: 0.3 % (ref 0–0.4)
LACTATE SERPL-SCNC: 1.5 MMOL/L (ref 0.4–2)
LYMPHOCYTES # BLD AUTO: 1.15 K/UL (ref 1–4.8)
LYMPHOCYTES NFR BLD AUTO: 19.1 % (ref 27–41)
MCH RBC QN AUTO: 30.4 PG (ref 27–31)
MCHC RBC AUTO-ENTMCNC: 31.7 G/DL (ref 32–36)
MCV RBC AUTO: 95.9 FL (ref 80–96)
MONOCYTES # BLD AUTO: 0.37 K/UL (ref 0–0.8)
MONOCYTES NFR BLD AUTO: 6.1 % (ref 2–6)
MPC BLD CALC-MCNC: 8.5 FL (ref 9.4–12.4)
NEUTROPHILS # BLD AUTO: 4.29 K/UL (ref 1.8–7.7)
NEUTROPHILS NFR BLD AUTO: 71.3 % (ref 53–65)
NRBC # BLD AUTO: 0 X10E3/UL
NRBC, AUTO (.00): 0 %
PLATELET # BLD AUTO: 254 K/UL (ref 150–400)
POTASSIUM SERPL-SCNC: 4 MMOL/L (ref 3.5–5.1)
PROT SERPL-MCNC: 7.1 G/DL (ref 6.4–8.2)
RBC # BLD AUTO: 2.96 M/UL (ref 4.2–5.4)
SODIUM SERPL-SCNC: 133 MMOL/L (ref 136–145)
WBC # BLD AUTO: 6.02 K/UL (ref 4.5–11)

## 2022-11-27 PROCEDURE — 25000003 PHARM REV CODE 250: Performed by: EMERGENCY MEDICINE

## 2022-11-27 PROCEDURE — 99284 PR EMERGENCY DEPT VISIT,LEVEL IV: ICD-10-PCS | Mod: ,,, | Performed by: EMERGENCY MEDICINE

## 2022-11-27 PROCEDURE — 36415 COLL VENOUS BLD VENIPUNCTURE: CPT | Performed by: EMERGENCY MEDICINE

## 2022-11-27 PROCEDURE — 83605 ASSAY OF LACTIC ACID: CPT | Performed by: EMERGENCY MEDICINE

## 2022-11-27 PROCEDURE — 96361 HYDRATE IV INFUSION ADD-ON: CPT

## 2022-11-27 PROCEDURE — 96374 THER/PROPH/DIAG INJ IV PUSH: CPT

## 2022-11-27 PROCEDURE — 99284 EMERGENCY DEPT VISIT MOD MDM: CPT | Mod: 25

## 2022-11-27 PROCEDURE — 85025 COMPLETE CBC W/AUTO DIFF WBC: CPT | Performed by: EMERGENCY MEDICINE

## 2022-11-27 PROCEDURE — 80053 COMPREHEN METABOLIC PANEL: CPT | Performed by: EMERGENCY MEDICINE

## 2022-11-27 PROCEDURE — 99284 EMERGENCY DEPT VISIT MOD MDM: CPT | Mod: ,,, | Performed by: EMERGENCY MEDICINE

## 2022-11-27 PROCEDURE — 63600175 PHARM REV CODE 636 W HCPCS: Performed by: EMERGENCY MEDICINE

## 2022-11-27 RX ORDER — ONDANSETRON 4 MG/1
4 TABLET, FILM COATED ORAL EVERY 6 HOURS
Qty: 12 TABLET | Refills: 0 | Status: SHIPPED | OUTPATIENT
Start: 2022-11-27 | End: 2023-12-14 | Stop reason: ALTCHOICE

## 2022-11-27 RX ORDER — ONDANSETRON 2 MG/ML
4 INJECTION INTRAMUSCULAR; INTRAVENOUS
Status: COMPLETED | OUTPATIENT
Start: 2022-11-27 | End: 2022-11-27

## 2022-11-27 RX ADMIN — ONDANSETRON HYDROCHLORIDE 4 MG: 2 SOLUTION INTRAMUSCULAR; INTRAVENOUS at 08:11

## 2022-11-27 RX ADMIN — SODIUM CHLORIDE 1000 ML: 9 INJECTION, SOLUTION INTRAVENOUS at 08:11

## 2022-11-28 ENCOUNTER — OFFICE VISIT (OUTPATIENT)
Dept: FAMILY MEDICINE | Facility: CLINIC | Age: 82
End: 2022-11-28
Payer: MEDICARE

## 2022-11-28 VITALS
RESPIRATION RATE: 18 BRPM | HEART RATE: 76 BPM | DIASTOLIC BLOOD PRESSURE: 60 MMHG | SYSTOLIC BLOOD PRESSURE: 118 MMHG | TEMPERATURE: 97 F | WEIGHT: 138.81 LBS | BODY MASS INDEX: 25.54 KG/M2 | OXYGEN SATURATION: 95 % | HEIGHT: 62 IN

## 2022-11-28 DIAGNOSIS — I48.0 PAROXYSMAL ATRIAL FIBRILLATION: ICD-10-CM

## 2022-11-28 DIAGNOSIS — R19.7 DIARRHEA, UNSPECIFIED TYPE: Primary | ICD-10-CM

## 2022-11-28 DIAGNOSIS — I10 PRIMARY HYPERTENSION: ICD-10-CM

## 2022-11-28 PROCEDURE — 99214 OFFICE O/P EST MOD 30 MIN: CPT | Mod: ,,, | Performed by: INTERNAL MEDICINE

## 2022-11-28 PROCEDURE — 99214 PR OFFICE/OUTPT VISIT, EST, LEVL IV, 30-39 MIN: ICD-10-PCS | Mod: ,,, | Performed by: INTERNAL MEDICINE

## 2022-11-28 RX ORDER — DIPHENOXYLATE HYDROCHLORIDE AND ATROPINE SULFATE 2.5; .025 MG/1; MG/1
2 TABLET ORAL 2 TIMES DAILY PRN
Qty: 30 TABLET | Refills: 0 | Status: SHIPPED | OUTPATIENT
Start: 2022-11-28 | End: 2023-07-26

## 2022-11-28 NOTE — DISCHARGE INSTRUCTIONS
DRINK PLENTY OF FLUIDS.  TAKE NAUSEA MEDICATION AS DIRECTED.  FOLLOW UP IF SYMPTOMS PERSIST OR WORSEN OR OTHERWISE AS NEEDED.

## 2022-11-28 NOTE — ED PROVIDER NOTES
Encounter Date: 11/27/2022    SCRIBE #1 NOTE: I, Nataliia Wang, am scribing for, and in the presence of,  Juan Antonio Lutz MD. I have scribed the entire note.     History     Chief Complaint   Patient presents with    Diarrhea    Weakness     82 y.o. female presents to the emergency department with complaints of tachycardia, abdominal pain, diarrhea, and weakness. Patient's chief complaint is tachycardia. Patient's  stated the patient has had diarrhea since Wednesday. Patient stated her abdominal pain has subsided for now. No other symptoms were reported.     The history is provided by the patient and the spouse.   Review of patient's allergies indicates:  No Known Allergies  Past Medical History:   Diagnosis Date    CVA (cerebral vascular accident)     Diabetes mellitus     Epilepsy, unspecified, not intractable, without status epilepticus     Hyperlipidemia     Hypertension      History reviewed. No pertinent surgical history.  History reviewed. No pertinent family history.  Social History     Tobacco Use    Smoking status: Never    Smokeless tobacco: Never   Substance Use Topics    Alcohol use: Never    Drug use: Never     Review of Systems   Constitutional: Negative.    HENT: Negative.     Eyes: Negative.    Respiratory: Negative.     Cardiovascular:         Tachycardia    Gastrointestinal:  Positive for abdominal pain and diarrhea. Negative for blood in stool.   Endocrine: Negative.    Genitourinary: Negative.    Musculoskeletal: Negative.    Skin: Negative.    Allergic/Immunologic: Negative.    Neurological:  Positive for weakness.   Hematological: Negative.    Psychiatric/Behavioral: Negative.     All other systems reviewed and are negative.    Physical Exam     Initial Vitals [11/27/22 1952]   BP Pulse Resp Temp SpO2   (!) 171/70 86 18 98.2 °F (36.8 °C) 99 %      MAP       --         Physical Exam    Vitals reviewed.  Constitutional: She appears well-developed and well-nourished. No distress.    HENT:   Head: Normocephalic.   Eyes: Conjunctivae are normal. Pupils are equal, round, and reactive to light.   Cardiovascular:  Normal rate, regular rhythm, normal heart sounds and intact distal pulses.           Pulmonary/Chest: Breath sounds normal.   Abdominal: Abdomen is soft. Bowel sounds are normal.     Neurological: She is alert and oriented to person, place, and time.   Skin: Skin is warm and dry.   Psychiatric: She has a normal mood and affect.       ED Course   Procedures  Labs Reviewed   COMPREHENSIVE METABOLIC PANEL - Abnormal; Notable for the following components:       Result Value    Sodium 133 (*)     Glucose 125 (*)     Creatinine 1.47 (*)     eGFR 36 (*)     All other components within normal limits   CBC WITH DIFFERENTIAL - Abnormal; Notable for the following components:    RBC 2.96 (*)     Hemoglobin 9.0 (*)     Hematocrit 28.4 (*)     MCHC 31.7 (*)     MPV 8.5 (*)     Neutrophils % 71.3 (*)     Lymphocytes % 19.1 (*)     Monocytes % 6.1 (*)     All other components within normal limits   LACTIC ACID, PLASMA - Normal   CBC W/ AUTO DIFFERENTIAL    Narrative:     The following orders were created for panel order CBC auto differential.  Procedure                               Abnormality         Status                     ---------                               -----------         ------                     CBC with Differential[[id number]]        Abnormal            Final result                 Please view results for these tests on the individual orders.   EXTRA TUBES    Narrative:     The following orders were created for panel order EXTRA TUBES.  Procedure                               Abnormality         Status                     ---------                               -----------         ------                     Light Green Top Hold[[id number]]                             In process                   Please view results for these tests on the individual orders.   LIGHT GREEN TOP HOLD           Imaging Results    None          Medications   sodium chloride 0.9% bolus 1,000 mL (1,000 mLs Intravenous New Bag 11/27/22 2056)   ondansetron injection 4 mg (4 mg Intravenous Given 11/27/22 2057)                Attending Attestation:           Physician Attestation for Scribe:  Physician Attestation Statement for Scribe #1: I, Juan Antonio Lutz MD, reviewed documentation, as scribed by Nataliia Wang in my presence, and it is both accurate and complete.                        Clinical Impression:   Final diagnoses:  [A08.4] Viral gastroenteritis (Primary)      ED Disposition Condition    Discharge Stable          ED Prescriptions       Medication Sig Dispense Start Date End Date Auth. Provider    ondansetron (ZOFRAN) 4 MG tablet Take 1 tablet (4 mg total) by mouth every 6 (six) hours. 12 tablet 11/27/2022 -- Juan Antonio Lutz MD          Follow-up Information       Follow up With Specialties Details Why Contact Info    Farhad Culver MD Internal Medicine, Family Medicine  As needed [address] [place] [address] [address] [facility] [address]  [phone]               Juan Antonio Lutz MD  11/27/22 4876

## 2022-11-28 NOTE — PATIENT INSTRUCTIONS
Zbigniew was seen today for hypertension.    Diagnoses and all orders for this visit:    Diarrhea, unspecified type    Paroxysmal atrial fibrillation    Primary hypertension    Other orders  The following orders have not been finalized:  -     diphenoxylate-atropine 2.5-0.025 mg (LOMOTIL) 2.5-0.025 mg per tablet

## 2022-11-28 NOTE — ED TRIAGE NOTES
Pt c/o diarrhea since Wednesday - pt family states pt has appt tomorrow with her doctor - pt family member translates

## 2022-11-28 NOTE — PROGRESS NOTES
Subjective:       Patient ID: Zbigniew Ventura is a 82 y.o. female.    Chief Complaint: Hypertension    Patient is here today for check up evaluation. Patient is with her  who is helping to translate. Patient's  states she went to the ER yesterday for diarrhea, nausea, and vomiting. States was sent home with Zofran. States still having diarrhea. Will prescribe Lomotil 2 PO BID prn diarrhea. Will follow in 2 months.     Current Medications:    Current Outpatient Medications:     amLODIPine (NORVASC) 5 MG tablet, Take 1 tablet (5 mg total) by mouth once daily., Disp: 90 tablet, Rfl: 1    apixaban (ELIQUIS) 5 mg Tab, Take 1 tablet (5 mg total) by mouth 2 (two) times daily., Disp: 60 tablet, Rfl: 6    atorvastatin (LIPITOR) 20 MG tablet, Take 1 tablet (20 mg total) by mouth once daily., Disp: 90 tablet, Rfl: 1    FEROSUL 325 mg (65 mg iron) Tab tablet, TAKE ONE (1) TABLET BY MOUTH TWICE DAILY, Disp: 180 tablet, Rfl: 1    levETIRAcetam (KEPPRA) 500 MG Tab, Take 1 tablet (500 mg total) by mouth 2 (two) times daily., Disp: 180 tablet, Rfl: 1    meclizine (ANTIVERT) 25 MG tablet, TAKE ONE (1) TABLET BY MOUTH EVERY 12 HOURS AS NEEDED, Disp: 180 tablet, Rfl: 1    naproxen (NAPROSYN) 375 MG tablet, TAKE ONE (1) TABLET (375 MG TOTAL) BY MOUTH  TWICE DAILY WITH FOOD AS NEEDED FOR KNEE PAIN, Disp: 180 tablet, Rfl: 1    omeprazole (PRILOSEC) 20 MG capsule, Take 1 capsule (20 mg total) by mouth once daily., Disp: 90 capsule, Rfl: 1    pioglitazone (ACTOS) 30 MG tablet, Take 1 tablet (30 mg total) by mouth once daily., Disp: 90 tablet, Rfl: 1    metoprolol succinate (TOPROL-XL) 25 MG 24 hr tablet, Take 0.5 tablets (12.5 mg total) by mouth 2 (two) times daily. (Patient not taking: Reported on 11/7/2022), Disp: 15 tablet, Rfl: 1    ondansetron (ZOFRAN) 4 MG tablet, Take 1 tablet (4 mg total) by mouth every 6 (six) hours. (Patient not taking: Reported on 11/28/2022), Disp: 12 tablet, Rfl: 0  No current facility-administered  medications for this visit.    Last Labs:     Admission on 11/27/2022, Discharged on 11/27/2022   Component Date Value    Sodium 11/27/2022 133 (L)     Potassium 11/27/2022 4.0     Chloride 11/27/2022 99     CO2 11/27/2022 22     Anion Gap 11/27/2022 16     Glucose 11/27/2022 125 (H)     BUN 11/27/2022 16     Creatinine 11/27/2022 1.47 (H)     BUN/Creatinine Ratio 11/27/2022 11     Calcium 11/27/2022 8.7     Total Protein 11/27/2022 7.1     Albumin 11/27/2022 3.9     Globulin 11/27/2022 3.2     A/G Ratio 11/27/2022 1.2     Bilirubin, Total 11/27/2022 0.5     Alk Phos 11/27/2022 70     ALT 11/27/2022 18     AST 11/27/2022 21     eGFR 11/27/2022 36 (L)     Lactic Acid 11/27/2022 1.5     WBC 11/27/2022 6.02     RBC 11/27/2022 2.96 (L)     Hemoglobin 11/27/2022 9.0 (L)     Hematocrit 11/27/2022 28.4 (L)     MCV 11/27/2022 95.9     MCH 11/27/2022 30.4     MCHC 11/27/2022 31.7 (L)     RDW 11/27/2022 12.7     Platelet Count 11/27/2022 254     MPV 11/27/2022 8.5 (L)     Neutrophils % 11/27/2022 71.3 (H)     Lymphocytes % 11/27/2022 19.1 (L)     Monocytes % 11/27/2022 6.1 (H)     Eosinophils % 11/27/2022 2.5     Basophils % 11/27/2022 0.7     Immature Granulocytes % 11/27/2022 0.3     nRBC, Auto 11/27/2022 0.0     Neutrophils, Abs 11/27/2022 4.29     Lymphocytes, Absolute 11/27/2022 1.15     Monocytes, Absolute 11/27/2022 0.37     Eosinophils, Absolute 11/27/2022 0.15     Basophils, Absolute 11/27/2022 0.04     Immature Granulocytes, A* 11/27/2022 0.02     nRBC, Absolute 11/27/2022 0.00     Diff Type 11/27/2022 Auto    Office Visit on 11/07/2022   Component Date Value    Cologuard Result 11/16/2022 Negative     Hepatitis C Ab 11/07/2022 Non-Reactive     Creatinine, Urine 11/07/2022 52     Microalbumin 11/07/2022 1.1     Microalbumin/Creatinine * 11/07/2022 21.2     Hemoglobin A1C 11/07/2022 5.6     Estimated Average Glucose 11/07/2022 100     Triglycerides 11/07/2022 193 (H)     Cholesterol 11/07/2022 152     HDL Cholesterol  11/07/2022 47     Cholesterol/HDL Ratio (R* 11/07/2022 3.2     Non-HDL 11/07/2022 105     LDL Calculated 11/07/2022 66     LDL/HDL 11/07/2022 1.4     VLDL 11/07/2022 39        Last Imaging:  Echo  · The left ventricle is normal in size with low normal systolic function.  · The estimated ejection fraction is 50%.  · Normal left ventricular diastolic function.  · Normal right ventricular size.  · Mild mitral regurgitation.  · Mild tricuspid regurgitation.  · Normal central venous pressure (3 mmHg).  · The estimated PA systolic pressure is 30 mmHg.  · There is left ventricular global hypokinesis.            Review of Systems   Gastrointestinal:  Positive for diarrhea, nausea and vomiting.   All other systems reviewed and are negative.      Objective:      Physical Exam  Vitals reviewed.   Constitutional:       Appearance: Normal appearance.   Cardiovascular:      Rate and Rhythm: Normal rate and regular rhythm.      Pulses: Normal pulses.      Heart sounds: Normal heart sounds.   Pulmonary:      Effort: Pulmonary effort is normal.      Breath sounds: Normal breath sounds.   Abdominal:      General: Abdomen is flat. Bowel sounds are normal.      Palpations: Abdomen is soft.   Musculoskeletal:         General: Normal range of motion.      Cervical back: Normal range of motion and neck supple.   Skin:     General: Skin is warm and dry.   Neurological:      General: No focal deficit present.      Mental Status: She is alert and oriented to person, place, and time. Mental status is at baseline.       Assessment:       1. Diarrhea, unspecified type        2. Paroxysmal atrial fibrillation        3. Primary hypertension             Plan:         Zbigniew was seen today for hypertension.    Diagnoses and all orders for this visit:    Diarrhea, unspecified type    Paroxysmal atrial fibrillation    Primary hypertension    Other orders  The following orders have not been finalized:  -     diphenoxylate-atropine 2.5-0.025 mg (LOMOTIL)  2.5-0.025 mg per tablet

## 2023-01-09 PROBLEM — I63.9 CEREBROVASCULAR ACCIDENT (CVA): Chronic | Status: RESOLVED | Noted: 2021-05-10 | Resolved: 2023-01-09

## 2023-01-18 ENCOUNTER — OFFICE VISIT (OUTPATIENT)
Dept: FAMILY MEDICINE | Facility: CLINIC | Age: 83
End: 2023-01-18
Payer: MEDICARE

## 2023-01-18 VITALS
DIASTOLIC BLOOD PRESSURE: 69 MMHG | OXYGEN SATURATION: 97 % | WEIGHT: 136 LBS | HEIGHT: 62 IN | RESPIRATION RATE: 16 BRPM | TEMPERATURE: 98 F | HEART RATE: 60 BPM | BODY MASS INDEX: 25.03 KG/M2 | SYSTOLIC BLOOD PRESSURE: 110 MMHG

## 2023-01-18 DIAGNOSIS — I10 PRIMARY HYPERTENSION: Primary | ICD-10-CM

## 2023-01-18 DIAGNOSIS — R56.9 SEIZURE: Chronic | ICD-10-CM

## 2023-01-18 DIAGNOSIS — E78.00 ELEVATED CHOLESTEROL: ICD-10-CM

## 2023-01-18 DIAGNOSIS — D64.9 ANEMIA, UNSPECIFIED TYPE: ICD-10-CM

## 2023-01-18 PROCEDURE — 99214 PR OFFICE/OUTPT VISIT, EST, LEVL IV, 30-39 MIN: ICD-10-PCS | Mod: ,,, | Performed by: INTERNAL MEDICINE

## 2023-01-18 PROCEDURE — 99214 OFFICE O/P EST MOD 30 MIN: CPT | Mod: ,,, | Performed by: INTERNAL MEDICINE

## 2023-01-18 RX ORDER — FERROUS SULFATE TAB 325 MG (65 MG ELEMENTAL FE) 325 (65 FE) MG
TAB ORAL
Qty: 180 TABLET | Refills: 1 | Status: CANCELLED | OUTPATIENT
Start: 2023-01-18

## 2023-01-18 RX ORDER — OMEPRAZOLE 20 MG/1
20 CAPSULE, DELAYED RELEASE ORAL DAILY
Qty: 90 CAPSULE | Refills: 1 | Status: CANCELLED | OUTPATIENT
Start: 2023-01-18

## 2023-01-18 RX ORDER — AMLODIPINE BESYLATE 5 MG/1
5 TABLET ORAL DAILY
Qty: 90 TABLET | Refills: 1 | Status: CANCELLED | OUTPATIENT
Start: 2023-01-18

## 2023-01-18 RX ORDER — ATORVASTATIN CALCIUM 20 MG/1
20 TABLET, FILM COATED ORAL DAILY
Qty: 90 TABLET | Refills: 1 | Status: CANCELLED | OUTPATIENT
Start: 2023-01-18

## 2023-01-18 RX ORDER — PIOGLITAZONEHYDROCHLORIDE 30 MG/1
30 TABLET ORAL DAILY
Qty: 90 TABLET | Refills: 1 | Status: CANCELLED | OUTPATIENT
Start: 2023-01-18

## 2023-01-18 RX ORDER — LEVETIRACETAM 500 MG/1
500 TABLET ORAL 2 TIMES DAILY
Qty: 180 TABLET | Refills: 1 | Status: CANCELLED | OUTPATIENT
Start: 2023-01-18

## 2023-01-18 RX ORDER — NAPROXEN 375 MG/1
TABLET ORAL
Qty: 180 TABLET | Refills: 1 | Status: CANCELLED | OUTPATIENT
Start: 2023-01-18

## 2023-01-18 RX ORDER — MECLIZINE HCL 25MG 25 MG/1
TABLET, CHEWABLE ORAL
Qty: 180 TABLET | Refills: 1 | Status: CANCELLED | OUTPATIENT
Start: 2023-01-18

## 2023-01-19 NOTE — PATIENT INSTRUCTIONS
Zbigniew was seen today for hypertension and medication refill.    Diagnoses and all orders for this visit:    Primary hypertension    Seizure    Anemia, unspecified type    Elevated cholesterol    Other orders  The following orders have not been finalized:  -     Cancel: amLODIPine (NORVASC) 5 MG tablet  -     Cancel: atorvastatin (LIPITOR) 20 MG tablet  -     Cancel: FEROSUL 325 mg (65 mg iron) Tab tablet  -     Cancel: levETIRAcetam (KEPPRA) 500 MG Tab  -     Cancel: meclizine (ANTIVERT) 25 MG tablet  -     Cancel: naproxen (NAPROSYN) 375 MG tablet  -     Cancel: omeprazole (PRILOSEC) 20 MG capsule  -     Cancel: pioglitazone (ACTOS) 30 MG tablet

## 2023-01-19 NOTE — PROGRESS NOTES
Subjective:       Patient ID: Zbigniew Ventura is a 82 y.o. female.    Chief Complaint: Hypertension and Medication Refill    Patient is here today for a follow up evaluation. Patient blood pressure is stable today on intake, 110/69. Patient is accompanied by .  states patient has no new complaints. Recent labs reviewed and discussed with patient. Labs within normal range. Patient is requesting medication refills. Will refill today. Will follow with patient in 3 months.     Current Medications:    Current Outpatient Medications:     amLODIPine (NORVASC) 5 MG tablet, Take 1 tablet (5 mg total) by mouth once daily., Disp: 90 tablet, Rfl: 1    apixaban (ELIQUIS) 5 mg Tab, Take 1 tablet (5 mg total) by mouth 2 (two) times daily., Disp: 60 tablet, Rfl: 6    atorvastatin (LIPITOR) 20 MG tablet, Take 1 tablet (20 mg total) by mouth once daily., Disp: 90 tablet, Rfl: 1    diphenoxylate-atropine 2.5-0.025 mg (LOMOTIL) 2.5-0.025 mg per tablet, Take 2 tablets by mouth 2 (two) times daily as needed for Diarrhea., Disp: 30 tablet, Rfl: 0    FEROSUL 325 mg (65 mg iron) Tab tablet, TAKE ONE (1) TABLET BY MOUTH TWICE DAILY, Disp: 180 tablet, Rfl: 1    levETIRAcetam (KEPPRA) 500 MG Tab, Take 1 tablet (500 mg total) by mouth 2 (two) times daily., Disp: 180 tablet, Rfl: 1    meclizine (ANTIVERT) 25 MG tablet, TAKE ONE (1) TABLET BY MOUTH EVERY 12 HOURS AS NEEDED, Disp: 180 tablet, Rfl: 1    naproxen (NAPROSYN) 375 MG tablet, TAKE ONE (1) TABLET (375 MG TOTAL) BY MOUTH  TWICE DAILY WITH FOOD AS NEEDED FOR KNEE PAIN, Disp: 180 tablet, Rfl: 1    omeprazole (PRILOSEC) 20 MG capsule, Take 1 capsule (20 mg total) by mouth once daily., Disp: 90 capsule, Rfl: 1    pioglitazone (ACTOS) 30 MG tablet, Take 1 tablet (30 mg total) by mouth once daily., Disp: 90 tablet, Rfl: 1    metoprolol succinate (TOPROL-XL) 25 MG 24 hr tablet, Take 0.5 tablets (12.5 mg total) by mouth 2 (two) times daily. (Patient not taking: Reported on 11/7/2022),  Disp: 15 tablet, Rfl: 1    ondansetron (ZOFRAN) 4 MG tablet, Take 1 tablet (4 mg total) by mouth every 6 (six) hours. (Patient not taking: Reported on 11/28/2022), Disp: 12 tablet, Rfl: 0    Last Labs:     No visits with results within 1 Month(s) from this visit.   Latest known visit with results is:   Admission on 11/27/2022, Discharged on 11/27/2022   Component Date Value    Sodium 11/27/2022 133 (L)     Potassium 11/27/2022 4.0     Chloride 11/27/2022 99     CO2 11/27/2022 22     Anion Gap 11/27/2022 16     Glucose 11/27/2022 125 (H)     BUN 11/27/2022 16     Creatinine 11/27/2022 1.47 (H)     BUN/Creatinine Ratio 11/27/2022 11     Calcium 11/27/2022 8.7     Total Protein 11/27/2022 7.1     Albumin 11/27/2022 3.9     Globulin 11/27/2022 3.2     A/G Ratio 11/27/2022 1.2     Bilirubin, Total 11/27/2022 0.5     Alk Phos 11/27/2022 70     ALT 11/27/2022 18     AST 11/27/2022 21     eGFR 11/27/2022 36 (L)     Lactic Acid 11/27/2022 1.5     WBC 11/27/2022 6.02     RBC 11/27/2022 2.96 (L)     Hemoglobin 11/27/2022 9.0 (L)     Hematocrit 11/27/2022 28.4 (L)     MCV 11/27/2022 95.9     MCH 11/27/2022 30.4     MCHC 11/27/2022 31.7 (L)     RDW 11/27/2022 12.7     Platelet Count 11/27/2022 254     MPV 11/27/2022 8.5 (L)     Neutrophils % 11/27/2022 71.3 (H)     Lymphocytes % 11/27/2022 19.1 (L)     Monocytes % 11/27/2022 6.1 (H)     Eosinophils % 11/27/2022 2.5     Basophils % 11/27/2022 0.7     Immature Granulocytes % 11/27/2022 0.3     nRBC, Auto 11/27/2022 0.0     Neutrophils, Abs 11/27/2022 4.29     Lymphocytes, Absolute 11/27/2022 1.15     Monocytes, Absolute 11/27/2022 0.37     Eosinophils, Absolute 11/27/2022 0.15     Basophils, Absolute 11/27/2022 0.04     Immature Granulocytes, A* 11/27/2022 0.02     nRBC, Absolute 11/27/2022 0.00     Diff Type 11/27/2022 Auto        Last Imaging:  Echo  · The left ventricle is normal in size with low normal systolic function.  · The estimated ejection fraction is 50%.  · Normal  left ventricular diastolic function.  · Normal right ventricular size.  · Mild mitral regurgitation.  · Mild tricuspid regurgitation.  · Normal central venous pressure (3 mmHg).  · The estimated PA systolic pressure is 30 mmHg.  · There is left ventricular global hypokinesis.            Review of Systems   All other systems reviewed and are negative.      Objective:      Physical Exam  Vitals reviewed.   Constitutional:       Appearance: Normal appearance. She is normal weight.   Cardiovascular:      Rate and Rhythm: Normal rate and regular rhythm.      Pulses: Normal pulses.      Heart sounds: Normal heart sounds.   Pulmonary:      Effort: Pulmonary effort is normal.      Breath sounds: Normal breath sounds.   Abdominal:      General: Abdomen is flat. Bowel sounds are normal.      Palpations: Abdomen is soft.   Musculoskeletal:         General: Normal range of motion.      Cervical back: Normal range of motion and neck supple.   Skin:     General: Skin is warm and dry.   Neurological:      General: No focal deficit present.      Mental Status: She is alert and oriented to person, place, and time. Mental status is at baseline.       Assessment:       1. Primary hypertension        2. Seizure        3. Anemia, unspecified type        4. Elevated cholesterol             Plan:         Zbigniew was seen today for hypertension and medication refill.    Diagnoses and all orders for this visit:    Primary hypertension    Seizure    Anemia, unspecified type    Elevated cholesterol    Other orders  The following orders have not been finalized:  -     Cancel: amLODIPine (NORVASC) 5 MG tablet  -     Cancel: atorvastatin (LIPITOR) 20 MG tablet  -     Cancel: FEROSUL 325 mg (65 mg iron) Tab tablet  -     Cancel: levETIRAcetam (KEPPRA) 500 MG Tab  -     Cancel: meclizine (ANTIVERT) 25 MG tablet  -     Cancel: naproxen (NAPROSYN) 375 MG tablet  -     Cancel: omeprazole (PRILOSEC) 20 MG capsule  -     Cancel: pioglitazone (ACTOS) 30 MG  tablet

## 2023-01-30 ENCOUNTER — OFFICE VISIT (OUTPATIENT)
Dept: NEUROLOGY | Facility: CLINIC | Age: 83
End: 2023-01-30
Payer: MEDICARE

## 2023-01-30 VITALS
BODY MASS INDEX: 25.03 KG/M2 | WEIGHT: 136 LBS | HEART RATE: 74 BPM | OXYGEN SATURATION: 99 % | DIASTOLIC BLOOD PRESSURE: 68 MMHG | SYSTOLIC BLOOD PRESSURE: 110 MMHG | HEIGHT: 62 IN | RESPIRATION RATE: 18 BRPM

## 2023-01-30 DIAGNOSIS — R42 DIZZINESS: ICD-10-CM

## 2023-01-30 DIAGNOSIS — R56.9 SEIZURE: Chronic | ICD-10-CM

## 2023-01-30 DIAGNOSIS — I63.9 CEREBROVASCULAR ACCIDENT (CVA), UNSPECIFIED MECHANISM: ICD-10-CM

## 2023-01-30 DIAGNOSIS — G40.909 NONINTRACTABLE EPILEPSY WITHOUT STATUS EPILEPTICUS, UNSPECIFIED EPILEPSY TYPE: Primary | Chronic | ICD-10-CM

## 2023-01-30 PROCEDURE — 99214 OFFICE O/P EST MOD 30 MIN: CPT | Mod: S$PBB,,, | Performed by: NURSE PRACTITIONER

## 2023-01-30 PROCEDURE — 99214 OFFICE O/P EST MOD 30 MIN: CPT | Mod: PBBFAC | Performed by: NURSE PRACTITIONER

## 2023-01-30 PROCEDURE — 99214 PR OFFICE/OUTPT VISIT, EST, LEVL IV, 30-39 MIN: ICD-10-PCS | Mod: S$PBB,,, | Performed by: NURSE PRACTITIONER

## 2023-01-30 RX ORDER — LEVETIRACETAM 500 MG/1
500 TABLET ORAL 2 TIMES DAILY
Qty: 180 TABLET | Refills: 1 | Status: SHIPPED | OUTPATIENT
Start: 2023-01-30 | End: 2023-04-19 | Stop reason: SDUPTHER

## 2023-01-30 RX ORDER — MECLIZINE HCL 25MG 25 MG/1
TABLET, CHEWABLE ORAL
Qty: 180 TABLET | Refills: 1 | Status: SHIPPED | OUTPATIENT
Start: 2023-01-30 | End: 2023-04-19 | Stop reason: SDUPTHER

## 2023-01-30 NOTE — PROGRESS NOTES
Subjective:       Patient ID: Zbigniew Ventura is a 82 y.o. female     Chief Complaint:    Chief Complaint   Patient presents with    Follow-up    Stroke          Allergies:  Patient has no known allergies.    Current Medications:    Outpatient Encounter Medications as of 1/30/2023   Medication Sig Dispense Refill    amLODIPine (NORVASC) 5 MG tablet Take 1 tablet (5 mg total) by mouth once daily. 90 tablet 1    apixaban (ELIQUIS) 5 mg Tab Take 1 tablet (5 mg total) by mouth 2 (two) times daily. 60 tablet 6    atorvastatin (LIPITOR) 20 MG tablet Take 1 tablet (20 mg total) by mouth once daily. 90 tablet 1    diphenoxylate-atropine 2.5-0.025 mg (LOMOTIL) 2.5-0.025 mg per tablet Take 2 tablets by mouth 2 (two) times daily as needed for Diarrhea. 30 tablet 0    FEROSUL 325 mg (65 mg iron) Tab tablet TAKE ONE (1) TABLET BY MOUTH TWICE DAILY 180 tablet 1    metoprolol succinate (TOPROL-XL) 25 MG 24 hr tablet Take 0.5 tablets (12.5 mg total) by mouth 2 (two) times daily. 15 tablet 1    omeprazole (PRILOSEC) 20 MG capsule Take 1 capsule (20 mg total) by mouth once daily. 90 capsule 1    pioglitazone (ACTOS) 30 MG tablet Take 1 tablet (30 mg total) by mouth once daily. 90 tablet 1    [DISCONTINUED] levETIRAcetam (KEPPRA) 500 MG Tab Take 1 tablet (500 mg total) by mouth 2 (two) times daily. 180 tablet 1    [DISCONTINUED] meclizine (ANTIVERT) 25 MG tablet TAKE ONE (1) TABLET BY MOUTH EVERY 12 HOURS AS NEEDED 180 tablet 1    [DISCONTINUED] naproxen (NAPROSYN) 375 MG tablet TAKE ONE (1) TABLET (375 MG TOTAL) BY MOUTH  TWICE DAILY WITH FOOD AS NEEDED FOR KNEE PAIN 180 tablet 1    levETIRAcetam (KEPPRA) 500 MG Tab Take 1 tablet (500 mg total) by mouth 2 (two) times daily. 180 tablet 1    meclizine (ANTIVERT) 25 MG tablet TAKE ONE (1) TABLET BY MOUTH EVERY 12 HOURS AS NEEDED 180 tablet 1    ondansetron (ZOFRAN) 4 MG tablet Take 1 tablet (4 mg total) by mouth every 6 (six) hours. (Patient not taking: Reported on 11/28/2022) 12 tablet 0      No facility-administered encounter medications on file as of 1/30/2023.       History of Present Illness  This Is a 82-year-old past history of diabetes and right hemispheric stroke and seizure.    Her family in room assists with communication, her .    They deny any seizures since her last visit with us and no new CVA symptoms.  She is on triple therapy including ASA 81mg as well as keppra 500mg BID which is filled by her primary care and denies any side effects.    She does have residual left-sided weakness from stroke but it is minimal. In     They do report that she has occasional episodes of dizziness, this since her prior CVA.  Her  has let her use his prescribed meclizine which worked very well and she inquires about her own prescription.         Review of Systems  Review of Systems   Constitutional:  Negative for diaphoresis and fever.   HENT:  Negative for congestion, hearing loss and tinnitus.    Eyes:  Negative for blurred vision, double vision, photophobia, discharge and redness.   Respiratory:  Negative for cough and shortness of breath.    Cardiovascular:  Negative for chest pain.   Gastrointestinal:  Negative for abdominal pain, nausea and vomiting.   Musculoskeletal:  Negative for back pain, joint pain, myalgias and neck pain.   Skin:  Negative for itching and rash.   Neurological:  Positive for dizziness. Negative for tremors, sensory change, speech change, focal weakness, seizures, loss of consciousness, weakness and headaches.   Psychiatric/Behavioral:  Negative for depression, hallucinations and memory loss. The patient does not have insomnia.    All other systems reviewed and are negative.   Objective:     NEUROLOGICAL EXAMINATION:     MENTAL STATUS   Oriented to person, place, and time.   Registration: recalls 3 of 3 objects. Recall at 5 minutes: recalls 3 of 3 objects.   Attention: normal. Concentration: normal.   Speech: speech is normal   Level of consciousness:  alert  Knowledge: good and consistent with education.   Normal comprehension.     CRANIAL NERVES     CN II   Visual fields full to confrontation.   Visual acuity: normal  Right visual field deficit: none  Left visual field deficit: none     CN III, IV, VI   Pupils are equal, round, and reactive to light.  Extraocular motions are normal.   Right pupil: Size: 3 mm. Shape: regular. Reactivity: brisk. Consensual response: intact. Accommodation: intact.   Left pupil: Size: 3 mm. Shape: regular. Reactivity: brisk. Consensual response: intact. Accommodation: intact.   CN III: no CN III palsy  CN VI: no CN VI palsy  Nystagmus: none   Diplopia: none  Upgaze: normal  Downgaze: normal  Conjugate gaze: present  Vestibulo-ocular reflex: present    CN V   Facial sensation intact.   Right facial sensation deficit: none  Left facial sensation deficit: none  Right corneal reflex: normal  Left corneal reflex: normal    CN VII   Facial expression full, symmetric.   Right facial weakness: none  Left facial weakness: none  Right taste: normal  Left taste: normal    CN VIII   CN VIII normal.   Hearing: intact    CN IX, X   CN IX normal.   CN X normal.   Palate: symmetric    CN XI   CN XI normal.   Right sternocleidomastoid strength: normal  Left sternocleidomastoid strength: normal  Right trapezius strength: normal  Left trapezius strength: normal    CN XII   CN XII normal.   Tongue: not atrophic  Fasciculations: absent  Tongue deviation: none    MOTOR EXAM   Muscle bulk: normal  Overall muscle tone: normal  Right arm tone: normal  Left arm tone: normal  Right arm pronator drift: absent  Left arm pronator drift: absent  Right leg tone: normal  Left leg tone: normal    Strength   Right neck flexion: 5/5  Left neck flexion: 5/5  Right neck extension: 5/5  Left neck extension: 5/5  Right deltoid: 5/5  Left deltoid: 5/5  Right biceps: 5/5  Left biceps: 5/5  Right triceps: 5/5  Left triceps: 5/5  Right wrist flexion: 5/5  Left wrist flexion:  5/5  Right wrist extension: 5/5  Left wrist extension: 5/5  Right interossei: 5/5  Left interossei: 5/5  Right iliopsoas: 5/5  Left iliopsoas: 5/5  Right quadriceps: 5/5  Left quadriceps: 5/5  Right hamstrin/5  Left hamstrin/5  Right anterior tibial: 5/5  Left anterior tibial: 5/5  Right posterior tibial: 5/5  Left posterior tibial: 5/5  Right gastroc: 5/5  Left gastroc: 5/5    REFLEXES     Reflexes   Right brachioradialis: 2+  Left brachioradialis: 2+  Right biceps: 2+  Left biceps: 2+  Right triceps: 2+  Left triceps: 2+  Right patellar: 2+  Left patellar: 2+  Right achilles: 2+  Left achilles: 2+  Right plantar: normal  Left plantar: normal  Right Calvin: absent  Left Calvin: absent  Right ankle clonus: absent  Left ankle clonus: absent  Right pendular knee jerk: absent  Left pendular knee jerk: absent    SENSORY EXAM   Light touch normal.   Right arm light touch: normal  Left arm light touch: normal  Right leg light touch: normal  Left leg light touch: normal  Vibration normal.   Right arm vibration: normal  Left arm vibration: normal  Right leg vibration: normal  Left leg vibration: normal  Proprioception normal.   Right arm proprioception: normal  Left arm proprioception: normal  Right leg proprioception: normal  Left leg proprioception: normal  Pinprick normal.   Right arm pinprick: normal  Left arm pinprick: normal  Right leg pinprick: normal  Left leg pinprick: normal  Graphesthesia: normal  Romberg: negative  Stereognosis: normal    GAIT AND COORDINATION     Gait  Gait: normal     Coordination   Finger to nose coordination: normal  Heel to shin coordination: normal  Tandem walking coordination: normal    Tremor   Resting tremor: absent  Intention tremor: absent  Action tremor: absent     Physical Exam  Vitals and nursing note reviewed.   Constitutional:       Appearance: Normal appearance.   HENT:      Head: Normocephalic.   Eyes:      Extraocular Movements: Extraocular movements intact and EOM  normal.      Pupils: Pupils are equal, round, and reactive to light.   Cardiovascular:      Rate and Rhythm: Normal rate and regular rhythm.   Pulmonary:      Effort: Pulmonary effort is normal.      Breath sounds: Normal breath sounds.   Musculoskeletal:         General: No swelling or tenderness. Normal range of motion.      Cervical back: Normal range of motion and neck supple.      Right lower leg: No edema.      Left lower leg: No edema.   Skin:     General: Skin is warm and dry.      Coloration: Skin is not jaundiced.      Findings: No rash.   Neurological:      General: No focal deficit present.      Mental Status: She is alert and oriented to person, place, and time.      GCS: GCS eye subscore is 4. GCS verbal subscore is 5. GCS motor subscore is 6.      Cranial Nerves: No cranial nerve deficit.      Sensory: No sensory deficit.      Motor: Motor function is intact. No weakness.      Coordination: Coordination is intact. Coordination normal. Finger-Nose-Finger Test, Heel to Shin Test and Romberg Test normal.      Gait: Gait is intact. Gait and tandem walk normal.      Deep Tendon Reflexes: Reflexes normal.      Reflex Scores:       Tricep reflexes are 2+ on the right side and 2+ on the left side.       Bicep reflexes are 2+ on the right side and 2+ on the left side.       Brachioradialis reflexes are 2+ on the right side and 2+ on the left side.       Patellar reflexes are 2+ on the right side and 2+ on the left side.       Achilles reflexes are 2+ on the right side and 2+ on the left side.  Psychiatric:         Mood and Affect: Mood normal.         Speech: Speech normal.         Behavior: Behavior normal.        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  Continue current medication including keppra as directed    2. Notify clinic if any seizures    3. Antivert as needed for dizziness      Seizure Precautions:  1. Take medications as directed  2. Avoid open flames and hot surfaces such as fires and grills  3. Avoid elevations such as ladders or stools  4. Avoid swimming alone  5. Make sure getting plenty of sleep, recommend 7-8 hours per day  6. Avoid alcohol and illicit drugs  7. No driving or operating heavy machinery for 6 months after last known seizure    Stroke risk modifiers:    1. Good sleep habits, recommend at least 7 hours total sleep daily  2. Continue management of blood pressure and cholesterol including medications, exercise, and good eating habits  3. Exercise as much as possible, recommend 5 days of the week for 30 minutes each day  4. Avoid smoking and alcohol  5. Go to the nearest emergency department immediately if any new or worsening weakness, speech difficulty, or numbness    Medications Discontinued During This Encounter   Medication Reason    meclizine (ANTIVERT) 25 MG tablet Reorder    levETIRAcetam (KEPPRA) 500 MG Tab Reorder    naproxen (NAPROSYN) 375 MG tablet            Requested Prescriptions     Signed Prescriptions Disp Refills    levETIRAcetam (KEPPRA) 500 MG Tab 180 tablet 1     Sig: Take 1 tablet (500 mg total) by mouth 2 (two) times daily.    meclizine (ANTIVERT) 25 MG tablet 180 tablet 1     Sig: TAKE ONE (1) TABLET BY MOUTH EVERY 12 HOURS AS NEEDED       No orders of the defined types were placed in this encounter.

## 2023-01-30 NOTE — PATIENT INSTRUCTIONS
1.  Continue current medication including keppra as directed    2. Notify clinic if any seizures    3. Antivert as needed for dizziness      Seizure Precautions:  1. Take medications as directed  2. Avoid open flames and hot surfaces such as fires and grills  3. Avoid elevations such as ladders or stools  4. Avoid swimming alone  5. Make sure getting plenty of sleep, recommend 7-8 hours per day  6. Avoid alcohol and illicit drugs  7. No driving or operating heavy machinery for 6 months after last known seizure    Stroke risk modifiers:    1. Good sleep habits, recommend at least 7 hours total sleep daily  2. Continue management of blood pressure and cholesterol including medications, exercise, and good eating habits  3. Exercise as much as possible, recommend 5 days of the week for 30 minutes each day  4. Avoid smoking and alcohol  5. Go to the nearest emergency department immediately if any new or worsening weakness, speech difficulty, or numbness

## 2023-02-06 PROBLEM — Z00.00 ENCOUNTER FOR INITIAL ANNUAL WELLNESS VISIT (AWV) IN MEDICARE PATIENT: Status: RESOLVED | Noted: 2022-11-07 | Resolved: 2023-02-06

## 2023-04-19 ENCOUNTER — OFFICE VISIT (OUTPATIENT)
Dept: FAMILY MEDICINE | Facility: CLINIC | Age: 83
End: 2023-04-19
Payer: MEDICARE

## 2023-04-19 VITALS
BODY MASS INDEX: 25.14 KG/M2 | HEIGHT: 62 IN | OXYGEN SATURATION: 95 % | RESPIRATION RATE: 18 BRPM | HEART RATE: 76 BPM | SYSTOLIC BLOOD PRESSURE: 123 MMHG | DIASTOLIC BLOOD PRESSURE: 76 MMHG | WEIGHT: 136.63 LBS | TEMPERATURE: 98 F

## 2023-04-19 DIAGNOSIS — K21.9 GASTROESOPHAGEAL REFLUX DISEASE, UNSPECIFIED WHETHER ESOPHAGITIS PRESENT: ICD-10-CM

## 2023-04-19 DIAGNOSIS — E78.5 DYSLIPIDEMIA: ICD-10-CM

## 2023-04-19 DIAGNOSIS — I10 HYPERTENSION, ESSENTIAL: Primary | ICD-10-CM

## 2023-04-19 DIAGNOSIS — R56.9 SEIZURE: Chronic | ICD-10-CM

## 2023-04-19 PROCEDURE — 99214 OFFICE O/P EST MOD 30 MIN: CPT | Mod: ,,, | Performed by: INTERNAL MEDICINE

## 2023-04-19 PROCEDURE — 99214 PR OFFICE/OUTPT VISIT, EST, LEVL IV, 30-39 MIN: ICD-10-PCS | Mod: ,,, | Performed by: INTERNAL MEDICINE

## 2023-04-19 RX ORDER — NAPROXEN 500 MG/1
500 TABLET ORAL 2 TIMES DAILY WITH MEALS
COMMUNITY
End: 2023-08-02

## 2023-04-20 PROBLEM — K21.9 GASTROESOPHAGEAL REFLUX DISEASE: Status: ACTIVE | Noted: 2023-04-20

## 2023-04-20 RX ORDER — OMEPRAZOLE 20 MG/1
20 CAPSULE, DELAYED RELEASE ORAL DAILY
Qty: 90 CAPSULE | Refills: 1 | Status: SHIPPED | OUTPATIENT
Start: 2023-04-20 | End: 2023-07-19 | Stop reason: SDUPTHER

## 2023-04-20 RX ORDER — AMLODIPINE BESYLATE 5 MG/1
5 TABLET ORAL DAILY
Qty: 90 TABLET | Refills: 1 | Status: SHIPPED | OUTPATIENT
Start: 2023-04-20 | End: 2023-07-19 | Stop reason: SDUPTHER

## 2023-04-20 RX ORDER — MECLIZINE HCL 25MG 25 MG/1
TABLET, CHEWABLE ORAL
Qty: 90 TABLET | Refills: 1 | Status: SHIPPED | OUTPATIENT
Start: 2023-04-20 | End: 2023-07-19 | Stop reason: SDUPTHER

## 2023-04-20 RX ORDER — PIOGLITAZONEHYDROCHLORIDE 30 MG/1
30 TABLET ORAL DAILY
Qty: 90 TABLET | Refills: 1 | Status: SHIPPED | OUTPATIENT
Start: 2023-04-20 | End: 2023-07-19 | Stop reason: SDUPTHER

## 2023-04-20 RX ORDER — ATORVASTATIN CALCIUM 20 MG/1
20 TABLET, FILM COATED ORAL DAILY
Qty: 90 TABLET | Refills: 1 | Status: SHIPPED | OUTPATIENT
Start: 2023-04-20 | End: 2023-07-19 | Stop reason: SDUPTHER

## 2023-04-20 RX ORDER — LEVETIRACETAM 500 MG/1
500 TABLET ORAL 2 TIMES DAILY
Qty: 120 TABLET | Refills: 1 | Status: SHIPPED | OUTPATIENT
Start: 2023-04-20 | End: 2023-07-19 | Stop reason: SDUPTHER

## 2023-04-20 NOTE — PROGRESS NOTES
Subjective:       Patient ID: Zbigniew Ventura is a 83 y.o. female.    Chief Complaint: Medication Refill    Medication Refill  Pertinent negatives include no abdominal pain, chest pain, fatigue or fever.   .  Patient presents with a history of GERD seizure disorder dyslipidemia and hypertension patient has been doing overall well.  However 1 several medicines refilled.  Will refill the on amlodipine for the hypertension refill Prilosec for the GERD he still has intermittent acid reflux.  Will also refill his Keppra and meclizine.  Refilled lovastatin atorvastatin in the on Eliquis for atrial fibrillation.    Current Medications:    Current Outpatient Medications:     FEROSUL 325 mg (65 mg iron) Tab tablet, TAKE ONE (1) TABLET BY MOUTH TWICE DAILY, Disp: 180 tablet, Rfl: 1    naproxen (NAPROSYN) 500 MG tablet, Take 500 mg by mouth 2 (two) times daily with meals. Take by mouth twice daily, Disp: , Rfl:     amLODIPine (NORVASC) 5 MG tablet, Take 1 tablet (5 mg total) by mouth once daily., Disp: 90 tablet, Rfl: 1    apixaban (ELIQUIS) 5 mg Tab, Take 1 tablet (5 mg total) by mouth 2 (two) times daily., Disp: 60 tablet, Rfl: 6    atorvastatin (LIPITOR) 20 MG tablet, Take 1 tablet (20 mg total) by mouth once daily., Disp: 90 tablet, Rfl: 1    diphenoxylate-atropine 2.5-0.025 mg (LOMOTIL) 2.5-0.025 mg per tablet, Take 2 tablets by mouth 2 (two) times daily as needed for Diarrhea. (Patient not taking: Reported on 4/19/2023), Disp: 30 tablet, Rfl: 0    levETIRAcetam (KEPPRA) 500 MG Tab, Take 1 tablet (500 mg total) by mouth 2 (two) times daily., Disp: 120 tablet, Rfl: 1    meclizine (ANTIVERT) 25 MG tablet, TAKE ONE (1) TABLET BY MOUTH EVERY 12 HOURS AS NEEDED, Disp: 90 tablet, Rfl: 1    metoprolol succinate (TOPROL-XL) 25 MG 24 hr tablet, Take 0.5 tablets (12.5 mg total) by mouth 2 (two) times daily. (Patient not taking: Reported on 4/19/2023), Disp: 15 tablet, Rfl: 1    omeprazole (PRILOSEC) 20 MG capsule, Take 1 capsule (20 mg  "total) by mouth once daily., Disp: 90 capsule, Rfl: 1    ondansetron (ZOFRAN) 4 MG tablet, Take 1 tablet (4 mg total) by mouth every 6 (six) hours. (Patient not taking: Reported on 11/28/2022), Disp: 12 tablet, Rfl: 0    pioglitazone (ACTOS) 30 MG tablet, Take 1 tablet (30 mg total) by mouth once daily., Disp: 90 tablet, Rfl: 1           Review of Systems   Constitutional:  Negative for appetite change, fatigue and fever.   Respiratory:  Negative for shortness of breath.    Cardiovascular:  Negative for chest pain.   Gastrointestinal:  Negative for abdominal pain and constipation.   Endocrine: Negative for polydipsia, polyphagia and polyuria.   Genitourinary:  Negative for difficulty urinating, frequency and hot flashes.   Allergic/Immunologic: Negative for environmental allergies.   Neurological:  Negative for dizziness and light-headedness.   Psychiatric/Behavioral:  Negative for agitation.               Vitals:    04/19/23 0958   BP: 123/76   BP Location: Left arm   Patient Position: Sitting   BP Method: Large (Automatic)   Pulse: 76   Resp: 18   Temp: 97.8 °F (36.6 °C)   TempSrc: Temporal   SpO2: 95%   Weight: 62 kg (136 lb 9.6 oz)   Height: 5' 2" (1.575 m)        Physical Exam  Vitals and nursing note reviewed.   Constitutional:       Appearance: Normal appearance.   Cardiovascular:      Rate and Rhythm: Normal rate and regular rhythm.      Pulses: Normal pulses.      Heart sounds: Normal heart sounds.   Pulmonary:      Effort: Pulmonary effort is normal.      Breath sounds: Normal breath sounds.   Abdominal:      General: Abdomen is flat. Bowel sounds are normal.      Palpations: Abdomen is soft.   Musculoskeletal:         General: Normal range of motion.   Skin:     General: Skin is warm and dry.   Neurological:      General: No focal deficit present.      Mental Status: She is alert and oriented to person, place, and time. Mental status is at baseline.         Last Labs:     No visits with results within 1 " Month(s) from this visit.   Latest known visit with results is:   Admission on 11/27/2022, Discharged on 11/27/2022   Component Date Value    Sodium 11/27/2022 133 (L)     Potassium 11/27/2022 4.0     Chloride 11/27/2022 99     CO2 11/27/2022 22     Anion Gap 11/27/2022 16     Glucose 11/27/2022 125 (H)     BUN 11/27/2022 16     Creatinine 11/27/2022 1.47 (H)     BUN/Creatinine Ratio 11/27/2022 11     Calcium 11/27/2022 8.7     Total Protein 11/27/2022 7.1     Albumin 11/27/2022 3.9     Globulin 11/27/2022 3.2     A/G Ratio 11/27/2022 1.2     Bilirubin, Total 11/27/2022 0.5     Alk Phos 11/27/2022 70     ALT 11/27/2022 18     AST 11/27/2022 21     eGFR 11/27/2022 36 (L)     Lactic Acid 11/27/2022 1.5     WBC 11/27/2022 6.02     RBC 11/27/2022 2.96 (L)     Hemoglobin 11/27/2022 9.0 (L)     Hematocrit 11/27/2022 28.4 (L)     MCV 11/27/2022 95.9     MCH 11/27/2022 30.4     MCHC 11/27/2022 31.7 (L)     RDW 11/27/2022 12.7     Platelet Count 11/27/2022 254     MPV 11/27/2022 8.5 (L)     Neutrophils % 11/27/2022 71.3 (H)     Lymphocytes % 11/27/2022 19.1 (L)     Monocytes % 11/27/2022 6.1 (H)     Eosinophils % 11/27/2022 2.5     Basophils % 11/27/2022 0.7     Immature Granulocytes % 11/27/2022 0.3     nRBC, Auto 11/27/2022 0.0     Neutrophils, Abs 11/27/2022 4.29     Lymphocytes, Absolute 11/27/2022 1.15     Monocytes, Absolute 11/27/2022 0.37     Eosinophils, Absolute 11/27/2022 0.15     Basophils, Absolute 11/27/2022 0.04     Immature Granulocytes, A* 11/27/2022 0.02     nRBC, Absolute 11/27/2022 0.00     Diff Type 11/27/2022 Auto        Last Imaging:  Echo  · The left ventricle is normal in size with low normal systolic function.  · The estimated ejection fraction is 50%.  · Normal left ventricular diastolic function.  · Normal right ventricular size.  · Mild mitral regurgitation.  · Mild tricuspid regurgitation.  · Normal central venous pressure (3 mmHg).  · The estimated PA systolic pressure is 30 mmHg.  · There is  left ventricular global hypokinesis.            **Labs and x-rays personally reviewed by me    ** reviewed      Objective:        Assessment:       1. Hypertension, essential        2. Seizure  levETIRAcetam (KEPPRA) 500 MG Tab      3. Gastroesophageal reflux disease, unspecified whether esophagitis present        4. Dyslipidemia             Plan:         [unfilled]

## 2023-05-17 ENCOUNTER — OFFICE VISIT (OUTPATIENT)
Dept: NEUROLOGY | Facility: CLINIC | Age: 83
End: 2023-05-17
Payer: MEDICARE

## 2023-05-17 VITALS
SYSTOLIC BLOOD PRESSURE: 112 MMHG | HEART RATE: 72 BPM | DIASTOLIC BLOOD PRESSURE: 60 MMHG | OXYGEN SATURATION: 95 % | WEIGHT: 136 LBS | BODY MASS INDEX: 25.03 KG/M2 | HEIGHT: 62 IN

## 2023-05-17 DIAGNOSIS — R56.9 SEIZURE: ICD-10-CM

## 2023-05-17 DIAGNOSIS — G81.94 LEFT HEMIPARESIS: ICD-10-CM

## 2023-05-17 DIAGNOSIS — I63.9 CEREBROVASCULAR ACCIDENT (CVA), UNSPECIFIED MECHANISM: ICD-10-CM

## 2023-05-17 DIAGNOSIS — G40.909 NONINTRACTABLE EPILEPSY WITHOUT STATUS EPILEPTICUS, UNSPECIFIED EPILEPSY TYPE: Primary | ICD-10-CM

## 2023-05-17 PROCEDURE — 99214 OFFICE O/P EST MOD 30 MIN: CPT | Mod: PBBFAC | Performed by: NURSE PRACTITIONER

## 2023-05-17 PROCEDURE — 99212 PR OFFICE/OUTPT VISIT, EST, LEVL II, 10-19 MIN: ICD-10-PCS | Mod: S$PBB,,, | Performed by: NURSE PRACTITIONER

## 2023-05-17 PROCEDURE — 99212 OFFICE O/P EST SF 10 MIN: CPT | Mod: S$PBB,,, | Performed by: NURSE PRACTITIONER

## 2023-05-17 NOTE — PROGRESS NOTES
Subjective:       Patient ID: Zbigniew Ventura is a 83 y.o. female     Chief Complaint:    Chief Complaint   Patient presents with    Follow-up          Allergies:  Patient has no known allergies.    Current Medications:    Outpatient Encounter Medications as of 5/17/2023   Medication Sig Dispense Refill    amLODIPine (NORVASC) 5 MG tablet Take 1 tablet (5 mg total) by mouth once daily. 90 tablet 1    apixaban (ELIQUIS) 5 mg Tab Take 1 tablet (5 mg total) by mouth 2 (two) times daily. 60 tablet 6    atorvastatin (LIPITOR) 20 MG tablet Take 1 tablet (20 mg total) by mouth once daily. 90 tablet 1    FEROSUL 325 mg (65 mg iron) Tab tablet TAKE ONE (1) TABLET BY MOUTH TWICE DAILY 180 tablet 1    levETIRAcetam (KEPPRA) 500 MG Tab Take 1 tablet (500 mg total) by mouth 2 (two) times daily. 120 tablet 1    meclizine (ANTIVERT) 25 MG tablet TAKE ONE (1) TABLET BY MOUTH EVERY 12 HOURS AS NEEDED 90 tablet 1    naproxen (NAPROSYN) 500 MG tablet Take 500 mg by mouth 2 (two) times daily with meals. Take by mouth twice daily      omeprazole (PRILOSEC) 20 MG capsule Take 1 capsule (20 mg total) by mouth once daily. 90 capsule 1    pioglitazone (ACTOS) 30 MG tablet Take 1 tablet (30 mg total) by mouth once daily. 90 tablet 1    diphenoxylate-atropine 2.5-0.025 mg (LOMOTIL) 2.5-0.025 mg per tablet Take 2 tablets by mouth 2 (two) times daily as needed for Diarrhea. (Patient not taking: Reported on 4/19/2023) 30 tablet 0    metoprolol succinate (TOPROL-XL) 25 MG 24 hr tablet Take 0.5 tablets (12.5 mg total) by mouth 2 (two) times daily. (Patient not taking: Reported on 4/19/2023) 15 tablet 1    ondansetron (ZOFRAN) 4 MG tablet Take 1 tablet (4 mg total) by mouth every 6 (six) hours. (Patient not taking: Reported on 11/28/2022) 12 tablet 0    [DISCONTINUED] amLODIPine (NORVASC) 5 MG tablet Take 1 tablet (5 mg total) by mouth once daily. 90 tablet 1    [DISCONTINUED] apixaban (ELIQUIS) 5 mg Tab Take 1 tablet (5 mg total) by mouth 2 (two)  times daily. 60 tablet 6    [DISCONTINUED] atorvastatin (LIPITOR) 20 MG tablet Take 1 tablet (20 mg total) by mouth once daily. 90 tablet 1    [DISCONTINUED] levETIRAcetam (KEPPRA) 500 MG Tab Take 1 tablet (500 mg total) by mouth 2 (two) times daily. 180 tablet 1    [DISCONTINUED] meclizine (ANTIVERT) 25 MG tablet TAKE ONE (1) TABLET BY MOUTH EVERY 12 HOURS AS NEEDED 180 tablet 1    [DISCONTINUED] omeprazole (PRILOSEC) 20 MG capsule Take 1 capsule (20 mg total) by mouth once daily. 90 capsule 1    [DISCONTINUED] pioglitazone (ACTOS) 30 MG tablet Take 1 tablet (30 mg total) by mouth once daily. 90 tablet 1     No facility-administered encounter medications on file as of 5/17/2023.       History of Present Illness  This Is a 82-year-old past history of diabetes and right hemispheric stroke and seizure.    Her family in room assists with communication, her .    They deny any seizures since her last visit with us and no new CVA symptoms.  She is on triple therapy including ASA 81mg as well as keppra 500mg BID which is filled by her primary care and denies any side effects.    She does have residual left-sided weakness from stroke but it is minimal. In     They do report that she has occasional episodes of dizziness, this since her prior CVA.  Her  has let her use his prescribed meclizine which worked very well and she inquires about her own prescription.         Review of Systems  Review of Systems   Constitutional:  Negative for diaphoresis and fever.   HENT:  Negative for congestion, hearing loss and tinnitus.    Eyes:  Negative for blurred vision, double vision, photophobia, discharge and redness.   Respiratory:  Negative for cough and shortness of breath.    Cardiovascular:  Negative for chest pain.   Gastrointestinal:  Negative for abdominal pain, nausea and vomiting.   Musculoskeletal:  Negative for back pain, joint pain, myalgias and neck pain.   Skin:  Negative for itching and rash.   Neurological:   Positive for dizziness. Negative for tremors, sensory change, speech change, focal weakness, seizures, loss of consciousness, weakness and headaches.   Psychiatric/Behavioral:  Negative for depression, hallucinations and memory loss. The patient does not have insomnia.    All other systems reviewed and are negative.   Objective:     NEUROLOGICAL EXAMINATION:     MENTAL STATUS   Oriented to person, place, and time.   Registration: recalls 3 of 3 objects. Recall at 5 minutes: recalls 3 of 3 objects.   Attention: normal. Concentration: normal.   Speech: speech is normal   Level of consciousness: alert  Knowledge: good and consistent with education.   Normal comprehension.     CRANIAL NERVES     CN II   Visual fields full to confrontation.   Visual acuity: normal  Right visual field deficit: none  Left visual field deficit: none     CN III, IV, VI   Pupils are equal, round, and reactive to light.  Extraocular motions are normal.   Right pupil: Size: 3 mm. Shape: regular. Reactivity: brisk. Consensual response: intact. Accommodation: intact.   Left pupil: Size: 3 mm. Shape: regular. Reactivity: brisk. Consensual response: intact. Accommodation: intact.   CN III: no CN III palsy  CN VI: no CN VI palsy  Nystagmus: none   Diplopia: none  Upgaze: normal  Downgaze: normal  Conjugate gaze: present  Vestibulo-ocular reflex: present    CN V   Facial sensation intact.   Right facial sensation deficit: none  Left facial sensation deficit: none  Right corneal reflex: normal  Left corneal reflex: normal    CN VII   Facial expression full, symmetric.   Right facial weakness: none  Left facial weakness: none  Right taste: normal  Left taste: normal    CN VIII   CN VIII normal.   Hearing: intact    CN IX, X   CN IX normal.   CN X normal.   Palate: symmetric    CN XI   CN XI normal.   Right sternocleidomastoid strength: normal  Left sternocleidomastoid strength: normal  Right trapezius strength: normal  Left trapezius strength:  normal    CN XII   CN XII normal.   Tongue: not atrophic  Fasciculations: absent  Tongue deviation: none    MOTOR EXAM   Muscle bulk: normal  Overall muscle tone: normal  Right arm tone: normal  Left arm tone: normal  Right arm pronator drift: absent  Left arm pronator drift: absent  Right leg tone: normal  Left leg tone: normal    Strength   Right neck flexion: 5/5  Left neck flexion: 5/5  Right neck extension: 5/5  Left neck extension: 5/5  Right deltoid: 5/5  Left deltoid: 5/5  Right biceps: 5/5  Left biceps: 5/5  Right triceps: 5/5  Left triceps: 5/5  Right wrist flexion: 5/5  Left wrist flexion: 5/5  Right wrist extension: 5/5  Left wrist extension: 5/5  Right interossei: 5/5  Left interossei: 5/5  Right iliopsoas: 5/5  Left iliopsoas: /  Right quadriceps: /  Left quadriceps: /  Right hamstrin/5  Left hamstrin/5  Right anterior tibial:   Left anterior tibial:   Right posterior tibial: /  Left posterior tibial: /5  Right gastroc: /  Left gastroc: 5/5    REFLEXES     Reflexes   Right brachioradialis: 2+  Left brachioradialis: 2+  Right biceps: 2+  Left biceps: 2+  Right triceps: 2+  Left triceps: 2+  Right patellar: 2+  Left patellar: 2+  Right achilles: 2+  Left achilles: 2+  Right plantar: normal  Left plantar: normal  Right Calvin: absent  Left Calvin: absent  Right ankle clonus: absent  Left ankle clonus: absent  Right pendular knee jerk: absent  Left pendular knee jerk: absent    SENSORY EXAM   Light touch normal.   Right arm light touch: normal  Left arm light touch: normal  Right leg light touch: normal  Left leg light touch: normal  Vibration normal.   Right arm vibration: normal  Left arm vibration: normal  Right leg vibration: normal  Left leg vibration: normal  Proprioception normal.   Right arm proprioception: normal  Left arm proprioception: normal  Right leg proprioception: normal  Left leg proprioception: normal  Pinprick normal.   Right arm pinprick: normal  Left arm  pinprick: normal  Right leg pinprick: normal  Left leg pinprick: normal  Graphesthesia: normal  Romberg: negative  Stereognosis: normal    GAIT AND COORDINATION     Gait  Gait: normal     Coordination   Finger to nose coordination: normal  Heel to shin coordination: normal  Tandem walking coordination: normal    Tremor   Resting tremor: absent  Intention tremor: absent  Action tremor: absent     Physical Exam  Vitals and nursing note reviewed.   Constitutional:       Appearance: Normal appearance.   HENT:      Head: Normocephalic.   Eyes:      Extraocular Movements: Extraocular movements intact and EOM normal.      Pupils: Pupils are equal, round, and reactive to light.   Cardiovascular:      Rate and Rhythm: Normal rate and regular rhythm.   Pulmonary:      Effort: Pulmonary effort is normal.      Breath sounds: Normal breath sounds.   Musculoskeletal:         General: No swelling or tenderness. Normal range of motion.      Cervical back: Normal range of motion and neck supple.      Right lower leg: No edema.      Left lower leg: No edema.   Skin:     General: Skin is warm and dry.      Coloration: Skin is not jaundiced.      Findings: No rash.   Neurological:      General: No focal deficit present.      Mental Status: She is alert and oriented to person, place, and time.      GCS: GCS eye subscore is 4. GCS verbal subscore is 5. GCS motor subscore is 6.      Cranial Nerves: No cranial nerve deficit.      Sensory: No sensory deficit.      Motor: Motor function is intact. No weakness.      Coordination: Coordination is intact. Coordination normal. Finger-Nose-Finger Test, Heel to Shin Test and Romberg Test normal.      Gait: Gait is intact. Gait and tandem walk normal.      Deep Tendon Reflexes: Reflexes normal.      Reflex Scores:       Tricep reflexes are 2+ on the right side and 2+ on the left side.       Bicep reflexes are 2+ on the right side and 2+ on the left side.       Brachioradialis reflexes are 2+ on the  right side and 2+ on the left side.       Patellar reflexes are 2+ on the right side and 2+ on the left side.       Achilles reflexes are 2+ on the right side and 2+ on the left side.  Psychiatric:         Mood and Affect: Mood normal.         Speech: Speech normal.         Behavior: Behavior normal.        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  Continue current medication including keppra as directed    2. Notify clinic if any seizures    3. Antivert as needed for dizziness      Seizure Precautions:  1. Take medications as directed  2. Avoid open flames and hot surfaces such as fires and grills  3. Avoid elevations such as ladders or stools  4. Avoid swimming alone  5. Make sure getting plenty of sleep, recommend 7-8 hours per day  6. Avoid alcohol and illicit drugs  7. No driving or operating heavy machinery for 6 months after last known seizure    Stroke risk modifiers:    1. Good sleep habits, recommend at least 7 hours total sleep daily  2. Continue management of blood pressure and cholesterol including medications, exercise, and good eating habits  3. Exercise as much as possible, recommend 5 days of the week for 30 minutes each day  4. Avoid smoking and alcohol  5. Go to the nearest emergency department immediately if any new or worsening weakness, speech difficulty, or numbness    There are no discontinued medications.          Requested Prescriptions      No prescriptions requested or ordered in this encounter       No orders of the defined types were placed in this  encounter.

## 2023-05-31 RX ORDER — APIXABAN 5 MG/1
TABLET, FILM COATED ORAL
Qty: 60 TABLET | Refills: 3 | Status: SHIPPED | OUTPATIENT
Start: 2023-05-31 | End: 2023-10-19

## 2023-06-09 DIAGNOSIS — Z71.89 COMPLEX CARE COORDINATION: ICD-10-CM

## 2023-07-19 ENCOUNTER — OFFICE VISIT (OUTPATIENT)
Dept: FAMILY MEDICINE | Facility: CLINIC | Age: 83
End: 2023-07-19
Payer: MEDICARE

## 2023-07-19 VITALS
RESPIRATION RATE: 17 BRPM | OXYGEN SATURATION: 97 % | WEIGHT: 137 LBS | HEART RATE: 76 BPM | HEIGHT: 66 IN | TEMPERATURE: 97 F | DIASTOLIC BLOOD PRESSURE: 65 MMHG | BODY MASS INDEX: 22.02 KG/M2 | SYSTOLIC BLOOD PRESSURE: 107 MMHG

## 2023-07-19 DIAGNOSIS — R42 VERTIGO: ICD-10-CM

## 2023-07-19 DIAGNOSIS — I10 HYPERTENSION, ESSENTIAL: Primary | ICD-10-CM

## 2023-07-19 LAB
ANION GAP SERPL CALCULATED.3IONS-SCNC: 11 MMOL/L (ref 7–16)
BUN SERPL-MCNC: 22 MG/DL (ref 7–18)
BUN/CREAT SERPL: 15 (ref 6–20)
CALCIUM SERPL-MCNC: 8.8 MG/DL (ref 8.5–10.1)
CHLORIDE SERPL-SCNC: 96 MMOL/L (ref 98–107)
CO2 SERPL-SCNC: 26 MMOL/L (ref 21–32)
CREAT SERPL-MCNC: 1.48 MG/DL (ref 0.55–1.02)
EGFR (NO RACE VARIABLE) (RUSH/TITUS): 35 ML/MIN/1.73M2
GLUCOSE SERPL-MCNC: 96 MG/DL (ref 74–106)
POTASSIUM SERPL-SCNC: 5.3 MMOL/L (ref 3.5–5.1)
SODIUM SERPL-SCNC: 128 MMOL/L (ref 136–145)

## 2023-07-19 PROCEDURE — 99214 OFFICE O/P EST MOD 30 MIN: CPT | Mod: ,,, | Performed by: INTERNAL MEDICINE

## 2023-07-19 PROCEDURE — 80048 BASIC METABOLIC PANEL: ICD-10-PCS | Mod: ,,, | Performed by: CLINICAL MEDICAL LABORATORY

## 2023-07-19 PROCEDURE — 80048 BASIC METABOLIC PNL TOTAL CA: CPT | Mod: ,,, | Performed by: CLINICAL MEDICAL LABORATORY

## 2023-07-19 PROCEDURE — 99214 PR OFFICE/OUTPT VISIT, EST, LEVL IV, 30-39 MIN: ICD-10-PCS | Mod: ,,, | Performed by: INTERNAL MEDICINE

## 2023-07-20 ENCOUNTER — TELEPHONE (OUTPATIENT)
Dept: FAMILY MEDICINE | Facility: CLINIC | Age: 83
End: 2023-07-20
Payer: MEDICARE

## 2023-07-20 RX ORDER — ATORVASTATIN CALCIUM 20 MG/1
20 TABLET, FILM COATED ORAL DAILY
Qty: 90 TABLET | Refills: 1 | Status: SHIPPED | OUTPATIENT
Start: 2023-07-20 | End: 2023-09-13 | Stop reason: SDUPTHER

## 2023-07-20 RX ORDER — OMEPRAZOLE 20 MG/1
20 CAPSULE, DELAYED RELEASE ORAL DAILY
Qty: 90 CAPSULE | Refills: 1 | Status: SHIPPED | OUTPATIENT
Start: 2023-07-20 | End: 2023-09-13 | Stop reason: SDUPTHER

## 2023-07-20 RX ORDER — PIOGLITAZONEHYDROCHLORIDE 30 MG/1
30 TABLET ORAL DAILY
Qty: 90 TABLET | Refills: 1 | Status: SHIPPED | OUTPATIENT
Start: 2023-07-20 | End: 2023-09-13 | Stop reason: SDUPTHER

## 2023-07-20 RX ORDER — AMLODIPINE BESYLATE 5 MG/1
5 TABLET ORAL DAILY
Qty: 90 TABLET | Refills: 1 | Status: SHIPPED | OUTPATIENT
Start: 2023-07-20 | End: 2023-09-13 | Stop reason: SDUPTHER

## 2023-07-20 RX ORDER — MECLIZINE HCL 25MG 25 MG/1
TABLET, CHEWABLE ORAL
Qty: 90 TABLET | Refills: 1 | Status: SHIPPED | OUTPATIENT
Start: 2023-07-20 | End: 2023-09-13 | Stop reason: SDUPTHER

## 2023-07-20 RX ORDER — LEVETIRACETAM 500 MG/1
500 TABLET ORAL 2 TIMES DAILY
Qty: 120 TABLET | Refills: 1 | Status: SHIPPED | OUTPATIENT
Start: 2023-07-20 | End: 2023-09-13 | Stop reason: SDUPTHER

## 2023-07-20 NOTE — TELEPHONE ENCOUNTER
----- Message from Farhad Culver MD sent at 7/20/2023 10:02 AM CDT -----  Need to see in  1 week please  abnl results     1004 Pt is scheduled to come in on 7/27/23

## 2023-07-26 RX ORDER — DIPHENOXYLATE HYDROCHLORIDE AND ATROPINE SULFATE 2.5; .025 MG/1; MG/1
TABLET ORAL
Qty: 30 TABLET | Refills: 0 | Status: SHIPPED | OUTPATIENT
Start: 2023-07-26 | End: 2023-09-14

## 2023-07-26 NOTE — PROGRESS NOTES
Subjective:       Patient ID: Zbigniew Ventura is a 83 y.o. female.    Chief Complaint: Hypertension and Back Pain  Patient seen and evaluated patient does complain of vertigo which is chronic patient has chronic hypertension today.  Also the plan today was to refill several medications which will be Norvasc will refill  Hypertension  Pertinent negatives include no chest pain or shortness of breath.   Back Pain  Pertinent negatives include no abdominal pain, chest pain or fever.   .    Current Medications:    Current Outpatient Medications:     ELIQUIS 5 mg Tab, TAKE 1 TABLET BY MOUTH TWICE DAILY AS DIRECTED, Disp: 60 tablet, Rfl: 3    FEROSUL 325 mg (65 mg iron) Tab tablet, TAKE ONE (1) TABLET BY MOUTH TWICE DAILY, Disp: 180 tablet, Rfl: 1    naproxen (NAPROSYN) 500 MG tablet, Take 500 mg by mouth 2 (two) times daily with meals. Take by mouth twice daily, Disp: , Rfl:     amLODIPine (NORVASC) 5 MG tablet, Take 1 tablet (5 mg total) by mouth once daily., Disp: 90 tablet, Rfl: 1    atorvastatin (LIPITOR) 20 MG tablet, Take 1 tablet (20 mg total) by mouth once daily., Disp: 90 tablet, Rfl: 1    diphenoxylate-atropine 2.5-0.025 mg (LOMOTIL) 2.5-0.025 mg per tablet, Take 2 tablets by mouth 2 (two) times daily as needed for Diarrhea. (Patient not taking: Reported on 4/19/2023), Disp: 30 tablet, Rfl: 0    levETIRAcetam (KEPPRA) 500 MG Tab, Take 1 tablet (500 mg total) by mouth 2 (two) times daily., Disp: 120 tablet, Rfl: 1    meclizine (ANTIVERT) 25 MG tablet, TAKE ONE (1) TABLET BY MOUTH EVERY 12 HOURS AS NEEDED, Disp: 90 tablet, Rfl: 1    metoprolol succinate (TOPROL-XL) 25 MG 24 hr tablet, Take 0.5 tablets (12.5 mg total) by mouth 2 (two) times daily. (Patient not taking: Reported on 4/19/2023), Disp: 15 tablet, Rfl: 1    omeprazole (PRILOSEC) 20 MG capsule, Take 1 capsule (20 mg total) by mouth once daily., Disp: 90 capsule, Rfl: 1    ondansetron (ZOFRAN) 4 MG tablet, Take 1 tablet (4 mg total) by mouth every 6 (six) hours.  "(Patient not taking: Reported on 11/28/2022), Disp: 12 tablet, Rfl: 0    pioglitazone (ACTOS) 30 MG tablet, Take 1 tablet (30 mg total) by mouth once daily., Disp: 90 tablet, Rfl: 1           Review of Systems   Constitutional:  Negative for appetite change, fatigue and fever.   Respiratory:  Negative for shortness of breath.    Cardiovascular:  Negative for chest pain.   Gastrointestinal:  Negative for abdominal pain and constipation.   Endocrine: Negative for polydipsia, polyphagia and polyuria.   Genitourinary:  Negative for difficulty urinating, frequency and hot flashes.   Musculoskeletal:  Positive for back pain.   Allergic/Immunologic: Negative for environmental allergies.   Neurological:  Positive for vertigo. Negative for light-headedness.   Psychiatric/Behavioral:  Negative for agitation.               Vitals:    07/19/23 1000   BP: 107/65   BP Location: Right arm   Patient Position: Sitting   BP Method: Large (Automatic)   Pulse: 76   Resp: 17   Temp: 97.1 °F (36.2 °C)   TempSrc: Temporal   SpO2: 97%   Weight: 62.1 kg (137 lb)   Height: 5' 6" (1.676 m)        Physical Exam  Vitals and nursing note reviewed.   Constitutional:       Appearance: Normal appearance.   Cardiovascular:      Rate and Rhythm: Normal rate and regular rhythm.      Pulses: Normal pulses.      Heart sounds: Normal heart sounds.   Pulmonary:      Effort: Pulmonary effort is normal.      Breath sounds: Normal breath sounds.   Abdominal:      General: Abdomen is flat. Bowel sounds are normal.      Palpations: Abdomen is soft.   Musculoskeletal:         General: Normal range of motion.   Skin:     General: Skin is warm and dry.   Neurological:      General: No focal deficit present.      Mental Status: She is alert and oriented to person, place, and time. Mental status is at baseline.         Last Labs:     Office Visit on 07/19/2023   Component Date Value    Sodium 07/19/2023 128 (L)     Potassium 07/19/2023 5.3 (H)     Chloride " 07/19/2023 96 (L)     CO2 07/19/2023 26     Anion Gap 07/19/2023 11     Glucose 07/19/2023 96     BUN 07/19/2023 22 (H)     Creatinine 07/19/2023 1.48 (H)     BUN/Creatinine Ratio 07/19/2023 15     Calcium 07/19/2023 8.8     eGFR 07/19/2023 35 (L)        Last Imaging:  Echo  · The left ventricle is normal in size with low normal systolic function.  · The estimated ejection fraction is 50%.  · Normal left ventricular diastolic function.  · Normal right ventricular size.  · Mild mitral regurgitation.  · Mild tricuspid regurgitation.  · Normal central venous pressure (3 mmHg).  · The estimated PA systolic pressure is 30 mmHg.  · There is left ventricular global hypokinesis.            **Labs and x-rays personally reviewed by me    ** reviewed      Objective:        Assessment:       1. Hypertension, essential  Basic Metabolic Panel    Basic Metabolic Panel      2. Vertigo  levETIRAcetam (KEPPRA) 500 MG Tab           Plan:         [unfilled]

## 2023-08-02 ENCOUNTER — OFFICE VISIT (OUTPATIENT)
Dept: FAMILY MEDICINE | Facility: CLINIC | Age: 83
End: 2023-08-02
Payer: MEDICARE

## 2023-08-02 VITALS
HEIGHT: 66 IN | SYSTOLIC BLOOD PRESSURE: 116 MMHG | OXYGEN SATURATION: 99 % | DIASTOLIC BLOOD PRESSURE: 60 MMHG | WEIGHT: 137.19 LBS | TEMPERATURE: 97 F | BODY MASS INDEX: 22.05 KG/M2 | HEART RATE: 80 BPM | RESPIRATION RATE: 18 BRPM

## 2023-08-02 DIAGNOSIS — R00.2 PALPITATIONS: ICD-10-CM

## 2023-08-02 DIAGNOSIS — N18.9 CHRONIC KIDNEY DISEASE, UNSPECIFIED CKD STAGE: Primary | ICD-10-CM

## 2023-08-02 DIAGNOSIS — R07.9 CHEST PAIN, UNSPECIFIED TYPE: ICD-10-CM

## 2023-08-02 DIAGNOSIS — Z13.1 SCREENING FOR DIABETES MELLITUS (DM): ICD-10-CM

## 2023-08-02 DIAGNOSIS — E11.9 DM (DIABETES MELLITUS): ICD-10-CM

## 2023-08-02 LAB
ANION GAP SERPL CALCULATED.3IONS-SCNC: 11 MMOL/L (ref 7–16)
BUN SERPL-MCNC: 19 MG/DL (ref 7–18)
BUN/CREAT SERPL: 14 (ref 6–20)
CALCIUM SERPL-MCNC: 8.7 MG/DL (ref 8.5–10.1)
CHLORIDE SERPL-SCNC: 101 MMOL/L (ref 98–107)
CO2 SERPL-SCNC: 25 MMOL/L (ref 21–32)
CREAT SERPL-MCNC: 1.38 MG/DL (ref 0.55–1.02)
EGFR (NO RACE VARIABLE) (RUSH/TITUS): 39 ML/MIN/1.73M2
EKG 12-LEAD: NORMAL
EST. AVERAGE GLUCOSE BLD GHB EST-MCNC: 100 MG/DL
GLUCOSE SERPL-MCNC: 100 MG/DL (ref 74–106)
HBA1C MFR BLD HPLC: 5.6 % (ref 4.5–6.6)
HEART RATE: 65
Lab: NORMAL
MAGNESIUM SERPL-MCNC: 1.9 MG/DL (ref 1.7–2.3)
POTASSIUM SERPL-SCNC: 5 MMOL/L (ref 3.5–5.1)
PR INTERVAL: 207
PRT AXES: NORMAL
QRS DURATION: 70
QT/QTC: NORMAL
SODIUM SERPL-SCNC: 132 MMOL/L (ref 136–145)
VENTRICULAR RATE: NORMAL

## 2023-08-02 PROCEDURE — 99214 PR OFFICE/OUTPT VISIT, EST, LEVL IV, 30-39 MIN: ICD-10-PCS | Mod: ,,, | Performed by: INTERNAL MEDICINE

## 2023-08-02 PROCEDURE — 99214 OFFICE O/P EST MOD 30 MIN: CPT | Mod: ,,, | Performed by: INTERNAL MEDICINE

## 2023-08-02 PROCEDURE — 83735 MAGNESIUM: ICD-10-PCS | Mod: ,,, | Performed by: CLINICAL MEDICAL LABORATORY

## 2023-08-02 PROCEDURE — 83036 HEMOGLOBIN GLYCOSYLATED A1C: CPT | Mod: ,,, | Performed by: CLINICAL MEDICAL LABORATORY

## 2023-08-02 PROCEDURE — 83735 ASSAY OF MAGNESIUM: CPT | Mod: ,,, | Performed by: CLINICAL MEDICAL LABORATORY

## 2023-08-02 PROCEDURE — 83036 HEMOGLOBIN A1C: ICD-10-PCS | Mod: ,,, | Performed by: CLINICAL MEDICAL LABORATORY

## 2023-08-02 PROCEDURE — 80048 BASIC METABOLIC PANEL: ICD-10-PCS | Mod: ,,, | Performed by: CLINICAL MEDICAL LABORATORY

## 2023-08-02 PROCEDURE — 80048 BASIC METABOLIC PNL TOTAL CA: CPT | Mod: ,,, | Performed by: CLINICAL MEDICAL LABORATORY

## 2023-08-02 PROCEDURE — 93005 ELECTROCARDIOGRAM TRACING: CPT | Mod: RHCUB | Performed by: INTERNAL MEDICINE

## 2023-08-08 PROBLEM — R07.9 CHEST PAIN: Status: ACTIVE | Noted: 2023-08-08

## 2023-08-08 PROBLEM — R00.2 PALPITATIONS: Status: ACTIVE | Noted: 2023-08-08

## 2023-08-08 PROBLEM — N18.9 CHRONIC KIDNEY DISEASE: Status: ACTIVE | Noted: 2023-08-08

## 2023-08-08 PROBLEM — Z13.1 SCREENING FOR DIABETES MELLITUS (DM): Status: ACTIVE | Noted: 2022-11-07

## 2023-08-08 NOTE — PROGRESS NOTES
Subjective:       Patient ID: Zbigniew Ventura is a 83 y.o. female.    Chief Complaint: Hypertension    Hypertension  Pertinent negatives include no chest pain or shortness of breath.   Patient seen and evaluated patient does have chronic kidney disease patient is complaining of chest pain which has been chronic and also complains of palpitations.    The plan refer to cardiology check a BNP hemoglobin A1c for health maintenance also check a magnesium level.  Order EKG today in the clinic.    Patient does have chronic kidney disease we will refer to nephrology.  And I am going to DC the NSAIDs.  .    Current Medications:    Current Outpatient Medications:     amLODIPine (NORVASC) 5 MG tablet, Take 1 tablet (5 mg total) by mouth once daily., Disp: 90 tablet, Rfl: 1    atorvastatin (LIPITOR) 20 MG tablet, Take 1 tablet (20 mg total) by mouth once daily., Disp: 90 tablet, Rfl: 1    diphenoxylate-atropine 2.5-0.025 mg (LOMOTIL) 2.5-0.025 mg per tablet, TAKE TWO (2) TABLETS BY MOUTH TWO (2) TIMES DAILY AS NEEDED FOR DIARRHEA., Disp: 30 tablet, Rfl: 0    ELIQUIS 5 mg Tab, TAKE 1 TABLET BY MOUTH TWICE DAILY AS DIRECTED, Disp: 60 tablet, Rfl: 3    FEROSUL 325 mg (65 mg iron) Tab tablet, TAKE ONE (1) TABLET BY MOUTH TWICE DAILY, Disp: 180 tablet, Rfl: 1    levETIRAcetam (KEPPRA) 500 MG Tab, Take 1 tablet (500 mg total) by mouth 2 (two) times daily., Disp: 120 tablet, Rfl: 1    meclizine (ANTIVERT) 25 MG tablet, TAKE ONE (1) TABLET BY MOUTH EVERY 12 HOURS AS NEEDED, Disp: 90 tablet, Rfl: 1    omeprazole (PRILOSEC) 20 MG capsule, Take 1 capsule (20 mg total) by mouth once daily., Disp: 90 capsule, Rfl: 1    pioglitazone (ACTOS) 30 MG tablet, Take 1 tablet (30 mg total) by mouth once daily., Disp: 90 tablet, Rfl: 1    metoprolol succinate (TOPROL-XL) 25 MG 24 hr tablet, Take 0.5 tablets (12.5 mg total) by mouth 2 (two) times daily. (Patient not taking: Reported on 4/19/2023), Disp: 15 tablet, Rfl: 1    ondansetron (ZOFRAN) 4 MG tablet,  "Take 1 tablet (4 mg total) by mouth every 6 (six) hours. (Patient not taking: Reported on 11/28/2022), Disp: 12 tablet, Rfl: 0           Review of Systems   Constitutional:  Negative for appetite change, fatigue and fever.   Respiratory:  Negative for shortness of breath.    Cardiovascular:  Negative for chest pain.   Gastrointestinal:  Negative for abdominal pain and constipation.   Endocrine: Negative for polydipsia, polyphagia and polyuria.   Genitourinary:  Negative for difficulty urinating, frequency and hot flashes.   Allergic/Immunologic: Negative for environmental allergies.   Neurological:  Negative for dizziness and light-headedness.   Psychiatric/Behavioral:  Negative for agitation.                 Vitals:    08/02/23 0850   BP: 116/60   BP Location: Right arm   Patient Position: Sitting   BP Method: Large (Automatic)   Pulse: 80   Resp: 18   Temp: 97.2 °F (36.2 °C)   TempSrc: Temporal   SpO2: 99%   Weight: 62.2 kg (137 lb 3.2 oz)   Height: 5' 6" (1.676 m)        Physical Exam  Vitals and nursing note reviewed.   Constitutional:       Appearance: Normal appearance.   Cardiovascular:      Rate and Rhythm: Normal rate and regular rhythm.      Pulses: Normal pulses.      Heart sounds: Normal heart sounds.   Pulmonary:      Effort: Pulmonary effort is normal.      Breath sounds: Normal breath sounds.   Abdominal:      General: Abdomen is flat. Bowel sounds are normal.      Palpations: Abdomen is soft.   Musculoskeletal:         General: Normal range of motion.   Skin:     General: Skin is warm and dry.   Neurological:      General: No focal deficit present.      Mental Status: She is alert and oriented to person, place, and time. Mental status is at baseline.           Last Labs:     Office Visit on 08/02/2023   Component Date Value    LA Interval 08/02/2023 207     QRS Duration 08/02/2023 70     QT/QTc 08/02/2023 420/437     PRT Axes 08/02/2023 -27/68/73     Heart Rate 08/02/2023 65     Results 08/02/2023   "    Sodium 08/02/2023 132 (L)     Potassium 08/02/2023 5.0     Chloride 08/02/2023 101     CO2 08/02/2023 25     Anion Gap 08/02/2023 11     Glucose 08/02/2023 100     BUN 08/02/2023 19 (H)     Creatinine 08/02/2023 1.38 (H)     BUN/Creatinine Ratio 08/02/2023 14     Calcium 08/02/2023 8.7     eGFR 08/02/2023 39 (L)     Hemoglobin A1C 08/02/2023 5.6     Estimated Average Glucose 08/02/2023 100     Magnesium 08/02/2023 1.9    Office Visit on 07/19/2023   Component Date Value    Sodium 07/19/2023 128 (L)     Potassium 07/19/2023 5.3 (H)     Chloride 07/19/2023 96 (L)     CO2 07/19/2023 26     Anion Gap 07/19/2023 11     Glucose 07/19/2023 96     BUN 07/19/2023 22 (H)     Creatinine 07/19/2023 1.48 (H)     BUN/Creatinine Ratio 07/19/2023 15     Calcium 07/19/2023 8.8     eGFR 07/19/2023 35 (L)        Last Imaging:  Echo  · The left ventricle is normal in size with low normal systolic function.  · The estimated ejection fraction is 50%.  · Normal left ventricular diastolic function.  · Normal right ventricular size.  · Mild mitral regurgitation.  · Mild tricuspid regurgitation.  · Normal central venous pressure (3 mmHg).  · The estimated PA systolic pressure is 30 mmHg.  · There is left ventricular global hypokinesis.            **Labs and x-rays personally reviewed by me    ** reviewed      Objective:        Assessment:       1. Chronic kidney disease, unspecified CKD stage  Ambulatory referral/consult to Nephrology      2. Palpitations  POCT EKG 12-LEAD (Manually Resulted by Ordering Provider)    Basic Metabolic Panel    Magnesium    Basic Metabolic Panel    Magnesium      3. Screening for diabetes mellitus (DM)  Hemoglobin A1C    Hemoglobin A1C      4. DM (diabetes mellitus)  Hemoglobin A1C    Hemoglobin A1C      5. Chest pain, unspecified type  Ambulatory referral/consult to Cardiology           Plan:         [unfilled]

## 2023-08-23 ENCOUNTER — OFFICE VISIT (OUTPATIENT)
Dept: NEUROLOGY | Facility: CLINIC | Age: 83
End: 2023-08-23
Payer: MEDICARE

## 2023-08-23 VITALS
BODY MASS INDEX: 22.02 KG/M2 | HEIGHT: 66 IN | HEART RATE: 64 BPM | DIASTOLIC BLOOD PRESSURE: 62 MMHG | OXYGEN SATURATION: 97 % | SYSTOLIC BLOOD PRESSURE: 118 MMHG | WEIGHT: 137 LBS

## 2023-08-23 DIAGNOSIS — G81.94 LEFT HEMIPARESIS: Chronic | ICD-10-CM

## 2023-08-23 DIAGNOSIS — I63.9 CEREBROVASCULAR ACCIDENT (CVA), UNSPECIFIED MECHANISM: Primary | ICD-10-CM

## 2023-08-23 DIAGNOSIS — G40.909 NONINTRACTABLE EPILEPSY WITHOUT STATUS EPILEPTICUS, UNSPECIFIED EPILEPSY TYPE: Chronic | ICD-10-CM

## 2023-08-23 PROCEDURE — 99212 OFFICE O/P EST SF 10 MIN: CPT | Mod: S$PBB,,, | Performed by: NURSE PRACTITIONER

## 2023-08-23 PROCEDURE — 99214 OFFICE O/P EST MOD 30 MIN: CPT | Mod: PBBFAC | Performed by: NURSE PRACTITIONER

## 2023-08-23 PROCEDURE — 99212 PR OFFICE/OUTPT VISIT, EST, LEVL II, 10-19 MIN: ICD-10-PCS | Mod: S$PBB,,, | Performed by: NURSE PRACTITIONER

## 2023-08-23 NOTE — PROGRESS NOTES
Subjective:       Patient ID: Zbigniwe Ventura is a 83 y.o. female     Chief Complaint:    Chief Complaint   Patient presents with    Follow-up          Allergies:  Patient has no known allergies.    Current Medications:    Outpatient Encounter Medications as of 8/23/2023   Medication Sig Dispense Refill    amLODIPine (NORVASC) 5 MG tablet Take 1 tablet (5 mg total) by mouth once daily. 90 tablet 1    atorvastatin (LIPITOR) 20 MG tablet Take 1 tablet (20 mg total) by mouth once daily. 90 tablet 1    diphenoxylate-atropine 2.5-0.025 mg (LOMOTIL) 2.5-0.025 mg per tablet TAKE TWO (2) TABLETS BY MOUTH TWO (2) TIMES DAILY AS NEEDED FOR DIARRHEA. 30 tablet 0    ELIQUIS 5 mg Tab TAKE 1 TABLET BY MOUTH TWICE DAILY AS DIRECTED 60 tablet 3    FEROSUL 325 mg (65 mg iron) Tab tablet TAKE ONE (1) TABLET BY MOUTH TWICE DAILY 180 tablet 1    levETIRAcetam (KEPPRA) 500 MG Tab Take 1 tablet (500 mg total) by mouth 2 (two) times daily. 120 tablet 1    meclizine (ANTIVERT) 25 MG tablet TAKE ONE (1) TABLET BY MOUTH EVERY 12 HOURS AS NEEDED 90 tablet 1    metoprolol succinate (TOPROL-XL) 25 MG 24 hr tablet Take 0.5 tablets (12.5 mg total) by mouth 2 (two) times daily. 15 tablet 1    omeprazole (PRILOSEC) 20 MG capsule Take 1 capsule (20 mg total) by mouth once daily. 90 capsule 1    ondansetron (ZOFRAN) 4 MG tablet Take 1 tablet (4 mg total) by mouth every 6 (six) hours. 12 tablet 0    pioglitazone (ACTOS) 30 MG tablet Take 1 tablet (30 mg total) by mouth once daily. 90 tablet 1    [DISCONTINUED] diphenoxylate-atropine 2.5-0.025 mg (LOMOTIL) 2.5-0.025 mg per tablet Take 2 tablets by mouth 2 (two) times daily as needed for Diarrhea. (Patient not taking: Reported on 4/19/2023) 30 tablet 0    [DISCONTINUED] naproxen (NAPROSYN) 500 MG tablet Take 500 mg by mouth 2 (two) times daily with meals. Take by mouth twice daily       No facility-administered encounter medications on file as of 8/23/2023.       History of Present Illness  This Is a  83-year-old past history of diabetes and right hemispheric stroke and seizure.    Her family in room assists with communication, her .    They deny any seizures since her last visit with us and no new CVA symptoms.  She is on triple therapy including ASA 81mg as well as keppra 500mg BID which is filled by her primary care and denies any side effects.    She does have residual left-sided weakness from stroke but it is minimal. In     They do report that she has occasional episodes of dizziness, this since her prior CVA.  Her  has let her use his prescribed meclizine which worked very well.           Review of Systems  Review of Systems   Constitutional:  Negative for diaphoresis and fever.   HENT:  Negative for congestion, hearing loss and tinnitus.    Eyes:  Negative for blurred vision, double vision, photophobia, discharge and redness.   Respiratory:  Negative for cough and shortness of breath.    Cardiovascular:  Negative for chest pain.   Gastrointestinal:  Negative for abdominal pain, nausea and vomiting.   Musculoskeletal:  Negative for back pain, joint pain, myalgias and neck pain.   Skin:  Negative for itching and rash.   Neurological:  Positive for dizziness and seizures. Negative for tremors, sensory change, speech change, focal weakness, loss of consciousness, weakness and headaches.   Psychiatric/Behavioral:  Negative for depression, hallucinations and memory loss. The patient does not have insomnia.    All other systems reviewed and are negative.     Objective:     NEUROLOGICAL EXAMINATION:     MENTAL STATUS   Oriented to person, place, and time.   Registration: recalls 3 of 3 objects. Recall at 5 minutes: recalls 3 of 3 objects.   Attention: normal. Concentration: normal.   Speech: speech is normal   Level of consciousness: alert  Knowledge: good and consistent with education.   Normal comprehension.     CRANIAL NERVES     CN II   Visual fields full to confrontation.   Visual acuity:  normal  Right visual field deficit: none  Left visual field deficit: none     CN III, IV, VI   Pupils are equal, round, and reactive to light.  Extraocular motions are normal.   Right pupil: Size: 3 mm. Shape: regular. Reactivity: brisk. Consensual response: intact. Accommodation: intact.   Left pupil: Size: 3 mm. Shape: regular. Reactivity: brisk. Consensual response: intact. Accommodation: intact.   CN III: no CN III palsy  CN VI: no CN VI palsy  Nystagmus: none   Diplopia: none  Upgaze: normal  Downgaze: normal  Conjugate gaze: present  Vestibulo-ocular reflex: present    CN V   Facial sensation intact.   Right facial sensation deficit: none  Left facial sensation deficit: none  Right corneal reflex: normal  Left corneal reflex: normal    CN VII   Facial expression full, symmetric.   Right facial weakness: none  Left facial weakness: none  Right taste: normal  Left taste: normal    CN VIII   CN VIII normal.   Hearing: intact    CN IX, X   CN IX normal.   CN X normal.   Palate: symmetric    CN XI   CN XI normal.   Right sternocleidomastoid strength: normal  Left sternocleidomastoid strength: normal  Right trapezius strength: normal  Left trapezius strength: normal    CN XII   CN XII normal.   Tongue: not atrophic  Fasciculations: absent  Tongue deviation: none    MOTOR EXAM   Muscle bulk: normal  Overall muscle tone: normal  Right arm tone: normal  Left arm tone: normal  Right arm pronator drift: absent  Left arm pronator drift: absent  Right leg tone: normal  Left leg tone: normal    Strength   Right neck flexion: 5/5  Left neck flexion: 5/5  Right neck extension: 5/5  Left neck extension: 5/5  Right deltoid: 5/5  Left deltoid: 5/5  Right biceps: 5/5  Left biceps: 5/5  Right triceps: 5/5  Left triceps: 5/5  Right wrist flexion: 5/5  Left wrist flexion: 5/5  Right wrist extension: 5/5  Left wrist extension: 5/5  Right interossei: 5/5  Left interossei: 5/5  Right iliopsoas: 5/5  Left iliopsoas: 5/5  Right  quadriceps: 5/5  Left quadriceps: 5/5  Right hamstrin/5  Left hamstrin/5  Right anterior tibial: 5/5  Left anterior tibial: 5/5  Right posterior tibial: 5/5  Left posterior tibial: 5/5  Right gastroc: 5/5  Left gastroc: 5/5    REFLEXES     Reflexes   Right brachioradialis: 2+  Left brachioradialis: 2+  Right biceps: 2+  Left biceps: 2+  Right triceps: 2+  Left triceps: 2+  Right patellar: 2+  Left patellar: 2+  Right achilles: 2+  Left achilles: 2+  Right plantar: normal  Left plantar: normal  Right Calvin: absent  Left Calvin: absent  Right ankle clonus: absent  Left ankle clonus: absent  Right pendular knee jerk: absent  Left pendular knee jerk: absent    SENSORY EXAM   Light touch normal.   Right arm light touch: normal  Left arm light touch: normal  Right leg light touch: normal  Left leg light touch: normal  Vibration normal.   Right arm vibration: normal  Left arm vibration: normal  Right leg vibration: normal  Left leg vibration: normal  Proprioception normal.   Right arm proprioception: normal  Left arm proprioception: normal  Right leg proprioception: normal  Left leg proprioception: normal  Pinprick normal.   Right arm pinprick: normal  Left arm pinprick: normal  Right leg pinprick: normal  Left leg pinprick: normal  Graphesthesia: normal  Romberg: negative  Stereognosis: normal    GAIT AND COORDINATION     Gait  Gait: normal     Coordination   Finger to nose coordination: normal  Heel to shin coordination: normal  Tandem walking coordination: normal    Tremor   Resting tremor: absent  Intention tremor: absent  Action tremor: absent       Physical Exam  Vitals and nursing note reviewed.   Constitutional:       Appearance: Normal appearance.   HENT:      Head: Normocephalic.   Eyes:      Extraocular Movements: Extraocular movements intact and EOM normal.      Pupils: Pupils are equal, round, and reactive to light.   Cardiovascular:      Rate and Rhythm: Normal rate and regular rhythm.   Pulmonary:       Effort: Pulmonary effort is normal.      Breath sounds: Normal breath sounds.   Musculoskeletal:         General: No swelling or tenderness. Normal range of motion.      Cervical back: Normal range of motion and neck supple.      Right lower leg: No edema.      Left lower leg: No edema.   Skin:     General: Skin is warm and dry.      Coloration: Skin is not jaundiced.      Findings: No rash.   Neurological:      General: No focal deficit present.      Mental Status: She is alert and oriented to person, place, and time.      GCS: GCS eye subscore is 4. GCS verbal subscore is 5. GCS motor subscore is 6.      Cranial Nerves: No cranial nerve deficit.      Sensory: No sensory deficit.      Motor: Motor function is intact. No weakness.      Coordination: Coordination is intact. Coordination normal. Finger-Nose-Finger Test, Heel to Shin Test and Romberg Test normal.      Gait: Gait is intact. Gait and tandem walk normal.      Deep Tendon Reflexes: Reflexes normal.      Reflex Scores:       Tricep reflexes are 2+ on the right side and 2+ on the left side.       Bicep reflexes are 2+ on the right side and 2+ on the left side.       Brachioradialis reflexes are 2+ on the right side and 2+ on the left side.       Patellar reflexes are 2+ on the right side and 2+ on the left side.       Achilles reflexes are 2+ on the right side and 2+ on the left side.  Psychiatric:         Mood and Affect: Mood normal.         Speech: Speech normal.         Behavior: Behavior normal.          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  Continue current medication including keppra as directed    2. Notify clinic if any  seizures    3. Antivert as needed for dizziness      Seizure Precautions:  1. Take medications as directed  2. Avoid open flames and hot surfaces such as fires and grills  3. Avoid elevations such as ladders or stools  4. Avoid swimming alone  5. Make sure getting plenty of sleep, recommend 7-8 hours per day  6. Avoid alcohol and illicit drugs  7. No driving or operating heavy machinery for 6 months after last known seizure    Stroke risk modifiers:    1. Good sleep habits, recommend at least 7 hours total sleep daily  2. Continue management of blood pressure and cholesterol including medications, exercise, and good eating habits  3. Exercise as much as possible, recommend 5 days of the week for 30 minutes each day  4. Avoid smoking and alcohol  5. Go to the nearest emergency department immediately if any new or worsening weakness, speech difficulty, or numbness    There are no discontinued medications.          Requested Prescriptions      No prescriptions requested or ordered in this encounter       No orders of the defined types were placed in this encounter.

## 2023-09-13 ENCOUNTER — OFFICE VISIT (OUTPATIENT)
Dept: FAMILY MEDICINE | Facility: CLINIC | Age: 83
End: 2023-09-13
Payer: MEDICARE

## 2023-09-13 VITALS
HEIGHT: 66 IN | OXYGEN SATURATION: 98 % | SYSTOLIC BLOOD PRESSURE: 125 MMHG | DIASTOLIC BLOOD PRESSURE: 81 MMHG | BODY MASS INDEX: 21.67 KG/M2 | TEMPERATURE: 97 F | HEART RATE: 67 BPM | WEIGHT: 134.81 LBS | RESPIRATION RATE: 18 BRPM

## 2023-09-13 DIAGNOSIS — I10 ESSENTIAL (PRIMARY) HYPERTENSION: Primary | ICD-10-CM

## 2023-09-13 DIAGNOSIS — E78.5 DYSLIPIDEMIA: ICD-10-CM

## 2023-09-13 DIAGNOSIS — R42 VERTIGO: ICD-10-CM

## 2023-09-13 PROCEDURE — 99214 PR OFFICE/OUTPT VISIT, EST, LEVL IV, 30-39 MIN: ICD-10-PCS | Mod: ,,, | Performed by: INTERNAL MEDICINE

## 2023-09-13 PROCEDURE — 99214 OFFICE O/P EST MOD 30 MIN: CPT | Mod: ,,, | Performed by: INTERNAL MEDICINE

## 2023-09-14 RX ORDER — FERROUS SULFATE TAB 325 MG (65 MG ELEMENTAL FE) 325 (65 FE) MG
TAB ORAL
Qty: 180 TABLET | Refills: 1 | Status: SHIPPED | OUTPATIENT
Start: 2023-09-14 | End: 2023-12-27 | Stop reason: SDUPTHER

## 2023-09-14 RX ORDER — DIPHENOXYLATE HYDROCHLORIDE AND ATROPINE SULFATE 2.5; .025 MG/1; MG/1
TABLET ORAL
Qty: 30 TABLET | Refills: 0 | Status: SHIPPED | OUTPATIENT
Start: 2023-09-14 | End: 2023-10-19

## 2023-09-19 RX ORDER — AMLODIPINE BESYLATE 5 MG/1
5 TABLET ORAL DAILY
Qty: 90 TABLET | Refills: 1 | Status: SHIPPED | OUTPATIENT
Start: 2023-09-19 | End: 2023-12-27 | Stop reason: SDUPTHER

## 2023-09-19 RX ORDER — ATORVASTATIN CALCIUM 20 MG/1
20 TABLET, FILM COATED ORAL DAILY
Qty: 90 TABLET | Refills: 1 | Status: SHIPPED | OUTPATIENT
Start: 2023-09-19 | End: 2023-12-27 | Stop reason: SDUPTHER

## 2023-09-19 RX ORDER — OMEPRAZOLE 20 MG/1
20 CAPSULE, DELAYED RELEASE ORAL DAILY
Qty: 90 CAPSULE | Refills: 1 | Status: SHIPPED | OUTPATIENT
Start: 2023-09-19 | End: 2023-12-27 | Stop reason: SDUPTHER

## 2023-09-19 RX ORDER — PIOGLITAZONEHYDROCHLORIDE 30 MG/1
30 TABLET ORAL DAILY
Qty: 90 TABLET | Refills: 1 | Status: SHIPPED | OUTPATIENT
Start: 2023-09-19 | End: 2023-12-27 | Stop reason: SDUPTHER

## 2023-09-19 RX ORDER — LEVETIRACETAM 500 MG/1
500 TABLET ORAL 2 TIMES DAILY
Qty: 120 TABLET | Refills: 1 | Status: SHIPPED | OUTPATIENT
Start: 2023-09-19 | End: 2023-11-27 | Stop reason: SDUPTHER

## 2023-09-19 RX ORDER — MECLIZINE HCL 25MG 25 MG/1
TABLET, CHEWABLE ORAL
Qty: 90 TABLET | Refills: 1 | Status: ON HOLD | OUTPATIENT
Start: 2023-09-19 | End: 2023-12-15 | Stop reason: HOSPADM

## 2023-09-19 NOTE — PROGRESS NOTES
Subjective:       Patient ID: Zbigniew Ventura is a 83 y.o. female.    Chief Complaint: Chronic Kidney Disease    HPI  .  Patient has chronic hypotension patient's clinic dyslipidemia overall no complaints except patient was medication refilled   For the hypertension refill Norvasc 5 mg 1 p.o. q.day.  For the dyslipidemia  Refill Lipitor 20 mg 1 p.o. q.day.    Patient is a seizure disorder will refill Keppra 500 mg 1 p.o. b.i.d..  Patient's vertigo   Refill Antivert 25 mg b.i.d. p.r.n. vertigo.    Patient has GERD will refill Prilosec 20 mg 1 q.day.  Current Medications:    Current Outpatient Medications:     ELIQUIS 5 mg Tab, TAKE 1 TABLET BY MOUTH TWICE DAILY AS DIRECTED, Disp: 60 tablet, Rfl: 3    metoprolol succinate (TOPROL-XL) 25 MG 24 hr tablet, Take 0.5 tablets (12.5 mg total) by mouth 2 (two) times daily., Disp: 15 tablet, Rfl: 1    ondansetron (ZOFRAN) 4 MG tablet, Take 1 tablet (4 mg total) by mouth every 6 (six) hours., Disp: 12 tablet, Rfl: 0    amLODIPine (NORVASC) 5 MG tablet, Take 1 tablet (5 mg total) by mouth once daily., Disp: 90 tablet, Rfl: 1    atorvastatin (LIPITOR) 20 MG tablet, Take 1 tablet (20 mg total) by mouth once daily., Disp: 90 tablet, Rfl: 1    diphenoxylate-atropine 2.5-0.025 mg (LOMOTIL) 2.5-0.025 mg per tablet, TAKE 1 TO 2 TABLETS  BY MOUTH TWO (2) TIMES DAILY AS NEEDED FOR DIARRHEA., Disp: 30 tablet, Rfl: 0    FEROSUL 325 mg (65 mg iron) Tab tablet, TAKE ONE (1) TABLET BY MOUTH TWICE DAILY, Disp: 180 tablet, Rfl: 1    levETIRAcetam (KEPPRA) 500 MG Tab, Take 1 tablet (500 mg total) by mouth 2 (two) times daily., Disp: 120 tablet, Rfl: 1    meclizine (ANTIVERT) 25 MG tablet, TAKE ONE (1) TABLET BY MOUTH EVERY 12 HOURS AS NEEDED, Disp: 90 tablet, Rfl: 1    omeprazole (PRILOSEC) 20 MG capsule, Take 1 capsule (20 mg total) by mouth once daily., Disp: 90 capsule, Rfl: 1    pioglitazone (ACTOS) 30 MG tablet, Take 1 tablet (30 mg total) by mouth once daily., Disp: 90 tablet, Rfl: 1    "        Review of Systems   Constitutional:  Negative for appetite change, fatigue and fever.   Respiratory:  Negative for shortness of breath.    Cardiovascular:  Negative for chest pain.   Gastrointestinal:  Negative for abdominal pain and constipation.   Endocrine: Negative for polydipsia, polyphagia and polyuria.   Genitourinary:  Negative for difficulty urinating, frequency and hot flashes.   Allergic/Immunologic: Negative for environmental allergies.   Neurological:  Negative for dizziness and light-headedness.   Psychiatric/Behavioral:  Negative for agitation.                 Vitals:    09/13/23 0845   BP: 125/81   BP Location: Right arm   Patient Position: Sitting   BP Method: Large (Automatic)   Pulse: 67   Resp: 18   Temp: 97.1 °F (36.2 °C)   TempSrc: Temporal   SpO2: 98%   Weight: 61.1 kg (134 lb 12.8 oz)   Height: 5' 6" (1.676 m)        Physical Exam  Vitals and nursing note reviewed.   Constitutional:       Appearance: Normal appearance.   Cardiovascular:      Rate and Rhythm: Normal rate and regular rhythm.      Pulses: Normal pulses.      Heart sounds: Normal heart sounds.   Pulmonary:      Effort: Pulmonary effort is normal.      Breath sounds: Normal breath sounds.   Abdominal:      General: Abdomen is flat. Bowel sounds are normal.      Palpations: Abdomen is soft.   Musculoskeletal:         General: Normal range of motion.   Skin:     General: Skin is warm and dry.   Neurological:      General: No focal deficit present.      Mental Status: She is alert and oriented to person, place, and time. Mental status is at baseline.           Last Labs:     No visits with results within 1 Month(s) from this visit.   Latest known visit with results is:   Office Visit on 08/02/2023   Component Date Value    MD Interval 08/02/2023 207     QRS Duration 08/02/2023 70     QT/QTc 08/02/2023 420/437     PRT Axes 08/02/2023 -27/68/73     Heart Rate 08/02/2023 65     Results 08/02/2023      Sodium 08/02/2023 132 (L)  "    Potassium 08/02/2023 5.0     Chloride 08/02/2023 101     CO2 08/02/2023 25     Anion Gap 08/02/2023 11     Glucose 08/02/2023 100     BUN 08/02/2023 19 (H)     Creatinine 08/02/2023 1.38 (H)     BUN/Creatinine Ratio 08/02/2023 14     Calcium 08/02/2023 8.7     eGFR 08/02/2023 39 (L)     Hemoglobin A1C 08/02/2023 5.6     Estimated Average Glucose 08/02/2023 100     Magnesium 08/02/2023 1.9        Last Imaging:  Echo  · The left ventricle is normal in size with low normal systolic function.  · The estimated ejection fraction is 50%.  · Normal left ventricular diastolic function.  · Normal right ventricular size.  · Mild mitral regurgitation.  · Mild tricuspid regurgitation.  · Normal central venous pressure (3 mmHg).  · The estimated PA systolic pressure is 30 mmHg.  · There is left ventricular global hypokinesis.            **Labs and x-rays personally reviewed by me    ** reviewed      Objective:        Assessment:       1. Essential (primary) hypertension        2. Vertigo  levETIRAcetam (KEPPRA) 500 MG Tab      3. Dyslipidemia             Plan:         [unfilled]

## 2023-10-17 ENCOUNTER — OFFICE VISIT (OUTPATIENT)
Dept: CARDIOLOGY | Facility: CLINIC | Age: 83
End: 2023-10-17
Payer: MEDICARE

## 2023-10-17 VITALS
BODY MASS INDEX: 24.68 KG/M2 | SYSTOLIC BLOOD PRESSURE: 110 MMHG | HEIGHT: 62 IN | WEIGHT: 134.13 LBS | DIASTOLIC BLOOD PRESSURE: 48 MMHG | HEART RATE: 86 BPM | OXYGEN SATURATION: 98 %

## 2023-10-17 DIAGNOSIS — I35.8 AORTIC HEART MURMUR: Primary | ICD-10-CM

## 2023-10-17 DIAGNOSIS — I63.9 CEREBROVASCULAR ACCIDENT (CVA), UNSPECIFIED MECHANISM: Chronic | ICD-10-CM

## 2023-10-17 DIAGNOSIS — I48.92 ATRIAL FLUTTER, UNSPECIFIED TYPE: Chronic | ICD-10-CM

## 2023-10-17 DIAGNOSIS — I48.0 PAROXYSMAL ATRIAL FIBRILLATION: Chronic | ICD-10-CM

## 2023-10-17 DIAGNOSIS — R56.9 SEIZURE: Chronic | ICD-10-CM

## 2023-10-17 PROCEDURE — 99214 OFFICE O/P EST MOD 30 MIN: CPT | Mod: PBBFAC | Performed by: INTERNAL MEDICINE

## 2023-10-17 PROCEDURE — 99215 OFFICE O/P EST HI 40 MIN: CPT | Mod: S$PBB,,, | Performed by: INTERNAL MEDICINE

## 2023-10-17 PROCEDURE — 93010 EKG 12-LEAD: ICD-10-PCS | Mod: S$PBB,,, | Performed by: INTERNAL MEDICINE

## 2023-10-17 PROCEDURE — 93005 ELECTROCARDIOGRAM TRACING: CPT | Mod: PBBFAC | Performed by: INTERNAL MEDICINE

## 2023-10-17 PROCEDURE — 99215 PR OFFICE/OUTPT VISIT, EST, LEVL V, 40-54 MIN: ICD-10-PCS | Mod: S$PBB,,, | Performed by: INTERNAL MEDICINE

## 2023-10-17 PROCEDURE — 93010 ELECTROCARDIOGRAM REPORT: CPT | Mod: S$PBB,,, | Performed by: INTERNAL MEDICINE

## 2023-10-18 NOTE — PROGRESS NOTES
PCP: Farhad Culver MD    Referring Provider:     Subjective:   Zbigniew Ventura is a 83 y.o. female with hx of NIDDM, HTN,  HLD, CVA, seizures, and paroxysmal atrial fibrillation who presents for chest pain.  Referred by Dr. Culver.  Patient denies chest pain, c/o occasional palpitations.       Fhx:No family history on file.  Shx:   Social History     Socioeconomic History    Marital status:    Occupational History    Occupation: disabled   Tobacco Use    Smoking status: Never    Smokeless tobacco: Never   Substance and Sexual Activity    Alcohol use: Never    Drug use: Never       EKG   10/17/23--NSR, 74 bpm  10/11/22--NSR, nonspecific T wave abn, 78 bpm.        ECHO Results for orders placed during the hospital encounter of 10/06/22    Echo    Interpretation Summary  · The left ventricle is normal in size with low normal systolic function.  · The estimated ejection fraction is 50%.  · Normal left ventricular diastolic function.  · Normal right ventricular size.  · Mild mitral regurgitation.  · Mild tricuspid regurgitation.  · Normal central venous pressure (3 mmHg).  · The estimated PA systolic pressure is 30 mmHg.  · There is left ventricular global hypokinesis.      Lab Results   Component Value Date     (L) 08/02/2023    K 5.0 08/02/2023     08/02/2023    CO2 25 08/02/2023    BUN 19 (H) 08/02/2023    CREATININE 1.38 (H) 08/02/2023    CALCIUM 8.7 08/02/2023    ANIONGAP 11 08/02/2023    ESTGFRAFRICA 43 06/29/2020    EGFRNONAA 54 (L) 06/21/2022       Lab Results   Component Value Date    CHOL 152 11/07/2022    CHOL 147 06/16/2021    CHOL 167 06/29/2020     Lab Results   Component Value Date    HDL 47 11/07/2022    HDL 58 06/16/2021    HDL 47 06/29/2020     Lab Results   Component Value Date    LDLCALC 66 11/07/2022    LDLCALC 69 06/16/2021    LDLCALC 95 06/29/2020     Lab Results   Component Value Date    TRIG 193 (H) 11/07/2022    TRIG 102 06/16/2021    TRIG 126 06/29/2020     Lab Results    Component Value Date    CHOLHDL 3.2 11/07/2022    CHOLHDL 2.5 06/16/2021       Lab Results   Component Value Date    WBC 6.02 11/27/2022    HGB 9.0 (L) 11/27/2022    HCT 28.4 (L) 11/27/2022    MCV 95.9 11/27/2022     11/27/2022           Current Outpatient Medications:     amLODIPine (NORVASC) 5 MG tablet, Take 1 tablet (5 mg total) by mouth once daily., Disp: 90 tablet, Rfl: 1    atorvastatin (LIPITOR) 20 MG tablet, Take 1 tablet (20 mg total) by mouth once daily., Disp: 90 tablet, Rfl: 1    ELIQUIS 5 mg Tab, TAKE 1 TABLET BY MOUTH TWICE DAILY AS DIRECTED, Disp: 60 tablet, Rfl: 3    FEROSUL 325 mg (65 mg iron) Tab tablet, TAKE ONE (1) TABLET BY MOUTH TWICE DAILY, Disp: 180 tablet, Rfl: 1    levETIRAcetam (KEPPRA) 500 MG Tab, Take 1 tablet (500 mg total) by mouth 2 (two) times daily., Disp: 120 tablet, Rfl: 1    meclizine (ANTIVERT) 25 MG tablet, TAKE ONE (1) TABLET BY MOUTH EVERY 12 HOURS AS NEEDED, Disp: 90 tablet, Rfl: 1    omeprazole (PRILOSEC) 20 MG capsule, Take 1 capsule (20 mg total) by mouth once daily., Disp: 90 capsule, Rfl: 1    pioglitazone (ACTOS) 30 MG tablet, Take 1 tablet (30 mg total) by mouth once daily., Disp: 90 tablet, Rfl: 1    diphenoxylate-atropine 2.5-0.025 mg (LOMOTIL) 2.5-0.025 mg per tablet, TAKE 1 TO 2 TABLETS  BY MOUTH TWO (2) TIMES DAILY AS NEEDED FOR DIARRHEA., Disp: 30 tablet, Rfl: 0    metoprolol succinate (TOPROL-XL) 25 MG 24 hr tablet, Take 0.5 tablets (12.5 mg total) by mouth 2 (two) times daily., Disp: 15 tablet, Rfl: 1    ondansetron (ZOFRAN) 4 MG tablet, Take 1 tablet (4 mg total) by mouth every 6 (six) hours., Disp: 12 tablet, Rfl: 0  Medications reviewed and reconciled.    Review of Systems   Respiratory:  Negative for shortness of breath.    Cardiovascular:  Positive for palpitations. Negative for chest pain and leg swelling.   Neurological:  Positive for dizziness. Negative for loss of consciousness.           Objective:   BP (!) 110/48 (BP Location: Left arm,  "Patient Position: Sitting)   Pulse 86   Ht 5' 2" (1.575 m)   Wt 60.8 kg (134 lb 1.9 oz)   SpO2 98%   BMI 24.53 kg/m²     Physical Exam  Vitals reviewed.   Constitutional:       Appearance: Normal appearance.   HENT:      Head: Normocephalic and atraumatic.   Neck:      Vascular: No carotid bruit or JVD.      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      Pulses: Normal pulses.           Radial pulses are 2+ on the right side and 2+ on the left side.        Dorsalis pedis pulses are 2+ on the right side and 2+ on the left side.      Heart sounds: Murmur heard.      Systolic murmur is present with a grade of 1/6.      Comments: I-II/ VI ZACH  Pulmonary:      Effort: Pulmonary effort is normal.      Breath sounds: Normal breath sounds.   Musculoskeletal:      Right lower leg: No edema.      Left lower leg: No edema.   Skin:     General: Skin is warm and dry.   Neurological:      Mental Status: She is alert and oriented to person, place, and time.           Assessment:     1. Aortic heart murmur  Echo      2. Atrial flutter, unspecified type  EKG 12-lead    EKG 12-lead      3. Paroxysmal atrial fibrillation  Ambulatory referral/consult to Cardiology      4. Cerebrovascular accident (CVA), unspecified mechanism        5. Seizure              Plan:   Increase po water intake  Echo, call with results  F/u in 6 months.         "

## 2023-10-19 PROBLEM — I35.8 AORTIC HEART MURMUR: Status: ACTIVE | Noted: 2023-10-19

## 2023-10-19 RX ORDER — APIXABAN 5 MG/1
TABLET, FILM COATED ORAL
Qty: 60 TABLET | Refills: 11 | Status: SHIPPED | OUTPATIENT
Start: 2023-10-19 | End: 2024-01-10 | Stop reason: SDUPTHER

## 2023-10-19 RX ORDER — DIPHENOXYLATE HYDROCHLORIDE AND ATROPINE SULFATE 2.5; .025 MG/1; MG/1
TABLET ORAL
Qty: 30 TABLET | Refills: 0 | Status: ON HOLD | OUTPATIENT
Start: 2023-10-19 | End: 2023-12-15 | Stop reason: HOSPADM

## 2023-10-20 ENCOUNTER — HOSPITAL ENCOUNTER (OUTPATIENT)
Dept: CARDIOLOGY | Facility: HOSPITAL | Age: 83
Discharge: HOME | End: 2023-10-20
Attending: INTERNAL MEDICINE
Payer: MEDICARE

## 2023-10-20 DIAGNOSIS — I35.8 AORTIC HEART MURMUR: ICD-10-CM

## 2023-10-20 PROCEDURE — 93306 TTE W/DOPPLER COMPLETE: CPT

## 2023-10-20 PROCEDURE — 93306 ECHO (CUPID ONLY): ICD-10-PCS | Mod: 26,,, | Performed by: INTERNAL MEDICINE

## 2023-10-20 PROCEDURE — 93306 TTE W/DOPPLER COMPLETE: CPT | Mod: 26,,, | Performed by: INTERNAL MEDICINE

## 2023-11-03 NOTE — PROGRESS NOTES
"  Zbigniew Ventura presented for a  Medicare AWV and comprehensive Health Risk Assessment today.     The following components were reviewed and updated:    Medical history  Family History  Social history  Allergies and Current Medications  Health Risk Assessment  Health Maintenance  Care Team         ** See Completed Assessments for Annual Wellness Visit within the encounter summary.**           The following assessments were completed:  Living Situation  CAGE  Depression Screening  Timed Get Up and Go  Whisper Test  Cognitive Function Screening  Nutrition Screening  ADL Screening  PAQ Screening        Vitals:    11/10/23 1003 11/10/23 1010 11/10/23 1113   BP: 118/64     BP Location: Right arm     Patient Position: Sitting     Pulse: (!) 121 (!) 123 110   Resp: 18     Temp: 98.8 °F (37.1 °C)     TempSrc: Oral     SpO2: 96%     Weight: 60.3 kg (133 lb)     Height: 4' 9" (1.448 m)       Body mass index is 28.78 kg/m².    Physical Exam  Constitutional:       General: She is not in acute distress.     Appearance: Normal appearance.   HENT:      Head: Normocephalic.      Right Ear: External ear normal.      Left Ear: External ear normal.   Cardiovascular:      Rate and Rhythm: Normal rate. Rhythm regularly irregular.      Pulses: Normal pulses.           Dorsalis pedis pulses are 2+ on the right side and 2+ on the left side.      Heart sounds: Normal heart sounds.   Pulmonary:      Effort: Pulmonary effort is normal.      Breath sounds: Normal breath sounds.   Abdominal:      Palpations: Abdomen is soft.      Tenderness: There is no abdominal tenderness.   Musculoskeletal:        Feet:    Skin:     General: Skin is warm and dry.   Neurological:      Mental Status: She is alert and oriented to person, place, and time.   Psychiatric:         Mood and Affect: Mood normal.         Behavior: Behavior normal.           Diagnoses and health risks identified today and associated recommendations/orders:    1. Encounter for subsequent " annual wellness visit (AWV) in Medicare patient  Return for repeat Annual Wellness Visit in 12 months    2. Essential (primary) hypertension  - Lipid Panel; Chol 166 Trig 154 LDL 62 HDL 73   - Microalbumin/Creatinine Ratio, Urine; positive for microalbuminuria   - CBC Auto Differential; Hgb 9.4 (12.0-16.0) Hct 27.2 (38.0-47.0) chronic due to CKD  - Continue Amlodipine 5 mg one tablet daily   - Continue Metoprolol 25 mg 1/2 tablet by mouth twice daily     3. Dyslipidemia  - Lipid Panel; Chol 166 Trig 154 LDL 62 HDL 73   - Continue Atorvastatin 20 mg one tablet by mouth daily   - Heart Healthy Diet & Daily Exercise recommended     4. Hemiparesis affecting left side as late effect of cerebrovascular accident  - Followed by Neurology  - Uses cane to assist with ambulation     5. Nonintractable epilepsy without status epilepticus, unspecified epilepsy type  - Followed by Neurology  - Continue Levetiracetam (Keppra) 500 mg one tablet by mouth twice daily       6. Stage 3b chronic kidney disease  - CBC Auto Differential; Hgb 9.4 (12.0-16.0) Hct 27.2 (38.0-47.0) chronic due to CKD  - Continue Ferosul 325 one table by mouth twice daily     7. Need for vaccination  - Influenza (FLUAD) - Quadrivalent (Adjuvanted) *Preferred* (65+) (PF) administered today in clinic   - (In Office Administered) Pneumococcal Conjugate Vaccine (20 Valent) (IM) (Preferred) administered today in clinic     8. Gastroesophageal reflux disease, unspecified whether esophagitis present  - stable  -Continue Omeprazole 20 mg one capsule by mouth daily     9. Paroxysmal atrial fibrillation  - Followed by Cardiology  - Continue Eliquis 5 mg once tablet twice daily as directed     10. Overweight (BMI 25.0-29.9)  - Heart Healthy Diet recommended     11. Anemia of Chronic Disease  - CBC Auto Differential; Hgb 9.4 (12.0-16.0) Hct 27.2 (38.0-47.0) chronic due to CKD  - Continue Ferosul 325 one table by mouth twice daily         Influenza vaccine - given this  visit, Pneumonia vaccine - given this visit,Tetanus vaccine - declined, Shingles vaccine - declined, Dexa scan - declined, RSV vaccine - VIS (10/19/2023)given with instructions to take at pharmacy, Lipid panel - ordered this visit, Diabetic urine screening - ordered this visit, Eye exam - declined, mammogram -declined.    I offered to discuss advanced care planning, including how to pick a person who would make decisions for you if you were unable to make them for yourself, called a health care power of , and what kind of decisions you might make such as use of life sustaining treatments such as ventilators and tube feeding when faced with a life limiting illness recorded on a living will that they will need to know. (How you want to be cared for as you near the end of your natural life)     X Patient is interested in learning more about how to make advanced directives.  I provided them paperwork and offered to discuss this with them.  Provided Zbigniew with a 5-10 year written screening schedule and personal prevention plan. Recommendations were developed using the USPSTF age appropriate recommendations. Education, counseling, and referrals were provided as needed. After Visit Summary printed and given to patient which includes a list of additional screenings\tests needed.    Follow up in about 1 year (around 11/10/2024).      TD Martinez NP

## 2023-11-10 ENCOUNTER — OFFICE VISIT (OUTPATIENT)
Dept: FAMILY MEDICINE | Facility: CLINIC | Age: 83
End: 2023-11-10
Payer: MEDICARE

## 2023-11-10 VITALS
WEIGHT: 133 LBS | BODY MASS INDEX: 28.69 KG/M2 | HEIGHT: 57 IN | RESPIRATION RATE: 18 BRPM | TEMPERATURE: 99 F | OXYGEN SATURATION: 96 % | SYSTOLIC BLOOD PRESSURE: 118 MMHG | HEART RATE: 110 BPM | DIASTOLIC BLOOD PRESSURE: 64 MMHG

## 2023-11-10 DIAGNOSIS — I10 ESSENTIAL (PRIMARY) HYPERTENSION: ICD-10-CM

## 2023-11-10 DIAGNOSIS — E78.5 DYSLIPIDEMIA: ICD-10-CM

## 2023-11-10 DIAGNOSIS — G40.909 NONINTRACTABLE EPILEPSY WITHOUT STATUS EPILEPTICUS, UNSPECIFIED EPILEPSY TYPE: Chronic | ICD-10-CM

## 2023-11-10 DIAGNOSIS — Z00.00 ENCOUNTER FOR SUBSEQUENT ANNUAL WELLNESS VISIT (AWV) IN MEDICARE PATIENT: Primary | ICD-10-CM

## 2023-11-10 DIAGNOSIS — Z23 NEED FOR VACCINATION: ICD-10-CM

## 2023-11-10 DIAGNOSIS — K21.9 GASTROESOPHAGEAL REFLUX DISEASE, UNSPECIFIED WHETHER ESOPHAGITIS PRESENT: ICD-10-CM

## 2023-11-10 DIAGNOSIS — D63.8 ANEMIA OF CHRONIC DISEASE: ICD-10-CM

## 2023-11-10 DIAGNOSIS — I48.0 PAROXYSMAL ATRIAL FIBRILLATION: ICD-10-CM

## 2023-11-10 DIAGNOSIS — N18.32 STAGE 3B CHRONIC KIDNEY DISEASE: ICD-10-CM

## 2023-11-10 DIAGNOSIS — I69.354 HEMIPARESIS AFFECTING LEFT SIDE AS LATE EFFECT OF CEREBROVASCULAR ACCIDENT: ICD-10-CM

## 2023-11-10 DIAGNOSIS — E66.3 OVERWEIGHT (BMI 25.0-29.9): ICD-10-CM

## 2023-11-10 LAB
BASOPHILS # BLD AUTO: 0.04 K/UL (ref 0–0.2)
BASOPHILS NFR BLD AUTO: 0.9 % (ref 0–1)
CHOLEST SERPL-MCNC: 166 MG/DL (ref 0–200)
CHOLEST/HDLC SERPL: 2.3 {RATIO}
CREAT UR-MCNC: <13 MG/DL (ref 28–219)
DIFFERENTIAL METHOD BLD: ABNORMAL
EOSINOPHIL # BLD AUTO: 0.06 K/UL (ref 0–0.5)
EOSINOPHIL NFR BLD AUTO: 1.3 % (ref 1–4)
ERYTHROCYTE [DISTWIDTH] IN BLOOD BY AUTOMATED COUNT: 12.8 % (ref 11.5–14.5)
HCT VFR BLD AUTO: 27.2 % (ref 38–47)
HDLC SERPL-MCNC: 73 MG/DL (ref 40–60)
HGB BLD-MCNC: 9.4 G/DL (ref 12–16)
IMM GRANULOCYTES # BLD AUTO: 0 K/UL (ref 0–0.04)
IMM GRANULOCYTES NFR BLD: 0 % (ref 0–0.4)
LDLC SERPL CALC-MCNC: 62 MG/DL
LYMPHOCYTES # BLD AUTO: 0.92 K/UL (ref 1–4.8)
LYMPHOCYTES NFR BLD AUTO: 20.4 % (ref 27–41)
MCH RBC QN AUTO: 29.9 PG (ref 27–31)
MCHC RBC AUTO-ENTMCNC: 34.6 G/DL (ref 32–36)
MCV RBC AUTO: 86.6 FL (ref 80–96)
MICROALBUMIN UR-MCNC: 6 MG/DL (ref 0–2.8)
MICROALBUMIN/CREAT RATIO PNL UR: ABNORMAL
MONOCYTES # BLD AUTO: 0.29 K/UL (ref 0–0.8)
MONOCYTES NFR BLD AUTO: 6.4 % (ref 2–6)
MPC BLD CALC-MCNC: 8.7 FL (ref 9.4–12.4)
NEUTROPHILS # BLD AUTO: 3.2 K/UL (ref 1.8–7.7)
NEUTROPHILS NFR BLD AUTO: 71 % (ref 53–65)
NONHDLC SERPL-MCNC: 93 MG/DL
NRBC # BLD AUTO: 0 X10E3/UL
NRBC, AUTO (.00): 0 %
PLATELET # BLD AUTO: 329 K/UL (ref 150–400)
RBC # BLD AUTO: 3.14 M/UL (ref 4.2–5.4)
TRIGL SERPL-MCNC: 154 MG/DL (ref 35–150)
VLDLC SERPL-MCNC: 31 MG/DL
WBC # BLD AUTO: 4.51 K/UL (ref 4.5–11)

## 2023-11-10 PROCEDURE — G0009 PNEUMOCOCCAL CONJUGATE VACCINE 20-VALENT: ICD-10-PCS | Mod: ,,, | Performed by: NURSE PRACTITIONER

## 2023-11-10 PROCEDURE — 90677 PCV20 VACCINE IM: CPT | Mod: ,,, | Performed by: NURSE PRACTITIONER

## 2023-11-10 PROCEDURE — 82570 ASSAY OF URINE CREATININE: CPT | Mod: ,,, | Performed by: CLINICAL MEDICAL LABORATORY

## 2023-11-10 PROCEDURE — 82043 UR ALBUMIN QUANTITATIVE: CPT | Mod: ,,, | Performed by: CLINICAL MEDICAL LABORATORY

## 2023-11-10 PROCEDURE — 85025 CBC WITH DIFFERENTIAL: ICD-10-PCS | Mod: ,,, | Performed by: CLINICAL MEDICAL LABORATORY

## 2023-11-10 PROCEDURE — G0008 FLU VACCINE - QUADRIVALENT - ADJUVANTED: ICD-10-PCS | Mod: ,,, | Performed by: NURSE PRACTITIONER

## 2023-11-10 PROCEDURE — 80061 LIPID PANEL: ICD-10-PCS | Mod: ,,, | Performed by: CLINICAL MEDICAL LABORATORY

## 2023-11-10 PROCEDURE — G0008 ADMIN INFLUENZA VIRUS VAC: HCPCS | Mod: ,,, | Performed by: NURSE PRACTITIONER

## 2023-11-10 PROCEDURE — G0009 ADMIN PNEUMOCOCCAL VACCINE: HCPCS | Mod: ,,, | Performed by: NURSE PRACTITIONER

## 2023-11-10 PROCEDURE — 82570 MICROALBUMIN / CREATININE RATIO URINE: ICD-10-PCS | Mod: ,,, | Performed by: CLINICAL MEDICAL LABORATORY

## 2023-11-10 PROCEDURE — 90694 VACC AIIV4 NO PRSRV 0.5ML IM: CPT | Mod: ,,, | Performed by: NURSE PRACTITIONER

## 2023-11-10 PROCEDURE — 85025 COMPLETE CBC W/AUTO DIFF WBC: CPT | Mod: ,,, | Performed by: CLINICAL MEDICAL LABORATORY

## 2023-11-10 PROCEDURE — G0439 PR MEDICARE ANNUAL WELLNESS SUBSEQUENT VISIT: ICD-10-PCS | Mod: ,,, | Performed by: NURSE PRACTITIONER

## 2023-11-10 PROCEDURE — 90694 FLU VACCINE - QUADRIVALENT - ADJUVANTED: ICD-10-PCS | Mod: ,,, | Performed by: NURSE PRACTITIONER

## 2023-11-10 PROCEDURE — 82043 MICROALBUMIN / CREATININE RATIO URINE: ICD-10-PCS | Mod: ,,, | Performed by: CLINICAL MEDICAL LABORATORY

## 2023-11-10 PROCEDURE — 80061 LIPID PANEL: CPT | Mod: ,,, | Performed by: CLINICAL MEDICAL LABORATORY

## 2023-11-10 PROCEDURE — G0439 PPPS, SUBSEQ VISIT: HCPCS | Mod: ,,, | Performed by: NURSE PRACTITIONER

## 2023-11-10 PROCEDURE — 90677 PNEUMOCOCCAL CONJUGATE VACCINE 20-VALENT: ICD-10-PCS | Mod: ,,, | Performed by: NURSE PRACTITIONER

## 2023-11-10 NOTE — LETTER
November 24, 2023    VALERIE Ventura  [address]  [place] [address]             [facility] [address] [facility]  [address]  [place] [address]  Phone: [phone]  Fax: [phone] Dear Mrs. Ventura:    Please contact office at [phone] to review lab results from previous visit.       Sincerely,        Marla Deluna LPN

## 2023-11-10 NOTE — PATIENT INSTRUCTIONS
Counseling and Referral of Other Preventative  (Italic type indicates deductible and co-insurance are waived)    Patient Name: Zbigniew Ventura  Today's Date: 11/10/2023    Health Maintenance       Date Due Completion Date    COVID-19 Vaccine (1) Never done ---    Pneumococcal Vaccines (Age 65+) (1 - PCV) Never done ---    TETANUS VACCINE Never done ---    DEXA Scan Never done ---    Shingles Vaccine (1 of 2) Never done ---    RSV Vaccine (Age 60+) (1 - 1-dose 60+ series) Never done ---    Eye Exam 07/01/2021 7/1/2020    Influenza Vaccine (1) 09/01/2023 10/6/2022    Lipid Panel 11/07/2023 11/7/2022    Diabetes Urine Screening 11/07/2023 11/7/2022    Hemoglobin A1c 02/02/2024 8/2/2023        No orders of the defined types were placed in this encounter.    The following information is provided to all patients.  This information is to help you find resources for any of the problems found today that may be affecting your health:                Living healthy guide: www.[facility].[place].gov      Understanding Diabetes: www.diabetes.org      Eating healthy: www.cdc.gov/healthyweight      CDC home safety checklist: www.cdc.gov/steadi/patient.html      Agency on Aging: www.goea.[place].gov      Alcoholics anonymous (AA): www.aa.org      Physical Activity: www.vivi.nih.gov/tk8remi      Tobacco use: www.quitwithusla.org

## 2023-11-13 PROBLEM — Z13.1 SCREENING FOR DIABETES MELLITUS (DM): Status: RESOLVED | Noted: 2022-11-07 | Resolved: 2023-11-13

## 2023-11-27 ENCOUNTER — OFFICE VISIT (OUTPATIENT)
Dept: NEUROLOGY | Facility: CLINIC | Age: 83
End: 2023-11-27
Payer: MEDICARE

## 2023-11-27 VITALS
BODY MASS INDEX: 28.26 KG/M2 | WEIGHT: 131 LBS | RESPIRATION RATE: 18 BRPM | OXYGEN SATURATION: 99 % | HEART RATE: 129 BPM | SYSTOLIC BLOOD PRESSURE: 122 MMHG | DIASTOLIC BLOOD PRESSURE: 70 MMHG | HEIGHT: 57 IN

## 2023-11-27 DIAGNOSIS — G81.94 LEFT HEMIPARESIS: ICD-10-CM

## 2023-11-27 DIAGNOSIS — G40.909 NONINTRACTABLE EPILEPSY WITHOUT STATUS EPILEPTICUS, UNSPECIFIED EPILEPSY TYPE: Primary | ICD-10-CM

## 2023-11-27 DIAGNOSIS — I63.9 CEREBROVASCULAR ACCIDENT (CVA), UNSPECIFIED MECHANISM: ICD-10-CM

## 2023-11-27 DIAGNOSIS — R42 VERTIGO: ICD-10-CM

## 2023-11-27 PROCEDURE — 99213 OFFICE O/P EST LOW 20 MIN: CPT | Mod: S$PBB,,, | Performed by: NURSE PRACTITIONER

## 2023-11-27 PROCEDURE — 99213 PR OFFICE/OUTPT VISIT, EST, LEVL III, 20-29 MIN: ICD-10-PCS | Mod: S$PBB,,, | Performed by: NURSE PRACTITIONER

## 2023-11-27 PROCEDURE — 99215 OFFICE O/P EST HI 40 MIN: CPT | Mod: PBBFAC | Performed by: NURSE PRACTITIONER

## 2023-11-27 RX ORDER — LEVETIRACETAM 500 MG/1
500 TABLET ORAL 2 TIMES DAILY
Qty: 120 TABLET | Refills: 1 | Status: SHIPPED | OUTPATIENT
Start: 2023-11-27 | End: 2023-12-27 | Stop reason: SDUPTHER

## 2023-11-27 NOTE — PROGRESS NOTES
Subjective:       Patient ID: Zbigniew Ventura is a 83 y.o. female     Chief Complaint:    Chief Complaint   Patient presents with    Follow-up          Allergies:  Patient has no known allergies.    Current Medications:    Outpatient Encounter Medications as of 11/27/2023   Medication Sig Dispense Refill    amLODIPine (NORVASC) 5 MG tablet Take 1 tablet (5 mg total) by mouth once daily. 90 tablet 1    atorvastatin (LIPITOR) 20 MG tablet Take 1 tablet (20 mg total) by mouth once daily. 90 tablet 1    diphenoxylate-atropine 2.5-0.025 mg (LOMOTIL) 2.5-0.025 mg per tablet TAKE ONE (1) TO TWO (2) TABLETS BY MOUTH TWO  TIMES DAILY AS NEEDED FOR DIARRHEA. 30 tablet 0    ELIQUIS 5 mg Tab TAKE ONE (1) TABLET BY MOUTH TWICE DAILY AS DIRECTED 60 tablet 11    FEROSUL 325 mg (65 mg iron) Tab tablet TAKE ONE (1) TABLET BY MOUTH TWICE DAILY 180 tablet 1    meclizine (ANTIVERT) 25 MG tablet TAKE ONE (1) TABLET BY MOUTH EVERY 12 HOURS AS NEEDED 90 tablet 1    omeprazole (PRILOSEC) 20 MG capsule Take 1 capsule (20 mg total) by mouth once daily. 90 capsule 1    ondansetron (ZOFRAN) 4 MG tablet Take 1 tablet (4 mg total) by mouth every 6 (six) hours. 12 tablet 0    pioglitazone (ACTOS) 30 MG tablet Take 1 tablet (30 mg total) by mouth once daily. 90 tablet 1    [DISCONTINUED] levETIRAcetam (KEPPRA) 500 MG Tab Take 1 tablet (500 mg total) by mouth 2 (two) times daily. 120 tablet 1    levETIRAcetam (KEPPRA) 500 MG Tab Take 1 tablet (500 mg total) by mouth 2 (two) times daily. 120 tablet 1    metoprolol succinate (TOPROL-XL) 25 MG 24 hr tablet Take 0.5 tablets (12.5 mg total) by mouth 2 (two) times daily. 15 tablet 1     No facility-administered encounter medications on file as of 11/27/2023.       History of Present Illness  This Is a 83-year-old past history of diabetes and right hemispheric stroke and seizure.    Her family in room assists with communication, her .    They deny any seizures since her last visit with us and no new  CVA symptoms.  She is on triple therapy including ASA 81mg as well as keppra 500mg BID which is filled by her primary care and denies any side effects.    She does have residual left-sided weakness from stroke but it is minimal. In     They do report that she has occasional episodes of dizziness, this since her prior CVA.  Her  has let her use his prescribed meclizine which worked very well.           Review of Systems  Review of Systems   Constitutional:  Negative for diaphoresis and fever.   HENT:  Negative for congestion, hearing loss and tinnitus.    Eyes:  Negative for blurred vision, double vision, photophobia, discharge and redness.   Respiratory:  Negative for cough and shortness of breath.    Cardiovascular:  Negative for chest pain.   Gastrointestinal:  Negative for abdominal pain, nausea and vomiting.   Musculoskeletal:  Negative for back pain, joint pain, myalgias and neck pain.   Skin:  Negative for itching and rash.   Neurological:  Positive for dizziness and seizures. Negative for tremors, sensory change, speech change, focal weakness, loss of consciousness, weakness and headaches.   Psychiatric/Behavioral:  Negative for depression, hallucinations and memory loss. The patient does not have insomnia.    All other systems reviewed and are negative.     Objective:     NEUROLOGICAL EXAMINATION:     MENTAL STATUS   Oriented to person, place, and time.   Attention: normal. Concentration: normal.   Speech: speech is normal   Level of consciousness: alert  Knowledge: good and consistent with education.   Normal comprehension.     CRANIAL NERVES     CN II   Visual fields full to confrontation.   Visual acuity: normal  Right visual field deficit: none  Left visual field deficit: none     CN III, IV, VI   Pupils are equal, round, and reactive to light.  Extraocular motions are normal.   Right pupil: Size: 3 mm. Shape: regular. Reactivity: brisk. Consensual response: intact. Accommodation: intact.   Left  pupil: Size: 3 mm. Shape: regular. Reactivity: brisk. Consensual response: intact. Accommodation: intact.   CN III: no CN III palsy  CN VI: no CN VI palsy  Nystagmus: none   Diplopia: none  Upgaze: normal  Downgaze: normal  Conjugate gaze: present  Vestibulo-ocular reflex: present    CN V   Facial sensation intact.   Right facial sensation deficit: none  Left facial sensation deficit: none  Right corneal reflex: normal  Left corneal reflex: normal    CN VII   Facial expression full, symmetric.   Right facial weakness: none  Left facial weakness: none  Right taste: normal  Left taste: normal    CN VIII   CN VIII normal.   Hearing: intact    CN IX, X   CN IX normal.   CN X normal.   Palate: symmetric    CN XI   CN XI normal.   Right sternocleidomastoid strength: normal  Left sternocleidomastoid strength: normal  Right trapezius strength: normal  Left trapezius strength: normal    CN XII   CN XII normal.   Tongue: not atrophic  Fasciculations: absent  Tongue deviation: none    MOTOR EXAM   Muscle bulk: normal  Overall muscle tone: normal  Right arm tone: normal  Left arm tone: normal  Right arm pronator drift: absent  Left arm pronator drift: absent  Right leg tone: normal  Left leg tone: normal    Strength   Right neck flexion: 5/5  Left neck flexion: 5/5  Right neck extension: 5/5  Left neck extension: 5/5  Right deltoid: 5/5  Left deltoid: 5/5  Right biceps: 5/5  Left biceps: 5/5  Right triceps: 5/5  Left triceps: 5/5  Right wrist flexion: 5/5  Left wrist flexion: 5/5  Right wrist extension: 5/5  Left wrist extension: 5/5  Right interossei: 5/5  Left interossei: 5/5  Right iliopsoas: 5/5  Left iliopsoas: 5/5  Right quadriceps: 5/5  Left quadriceps: 5/5  Right hamstrin/5  Left hamstrin/5  Right anterior tibial: 5/5  Left anterior tibial: 5/5  Right posterior tibial: 5/5  Left posterior tibial: 5/5  Right gastroc: 5/5  Left gastroc: 5/5    REFLEXES     Reflexes   Right brachioradialis: 2+  Left  brachioradialis: 2+  Right biceps: 2+  Left biceps: 2+  Right triceps: 2+  Left triceps: 2+  Right patellar: 2+  Left patellar: 2+  Right achilles: 2+  Left achilles: 2+  Right plantar: normal  Left plantar: normal  Right Calvin: absent  Left Calvin: absent  Right ankle clonus: absent  Left ankle clonus: absent  Right pendular knee jerk: absent  Left pendular knee jerk: absent    SENSORY EXAM   Light touch normal.   Right arm light touch: normal  Left arm light touch: normal  Right leg light touch: normal  Left leg light touch: normal  Vibration normal.   Right arm vibration: normal  Left arm vibration: normal  Right leg vibration: normal  Left leg vibration: normal  Proprioception normal.   Right arm proprioception: normal  Left arm proprioception: normal  Right leg proprioception: normal  Left leg proprioception: normal  Pinprick normal.   Right arm pinprick: normal  Left arm pinprick: normal  Right leg pinprick: normal  Left leg pinprick: normal  Graphesthesia: normal  Romberg: negative  Stereognosis: normal    GAIT AND COORDINATION      Coordination   Finger to nose coordination: normal  Heel to shin coordination: normal  Tandem walking coordination: abnormal    Tremor   Resting tremor: absent  Intention tremor: absent  Action tremor: absent       Using cane to assist with ambulation        Physical Exam  Vitals and nursing note reviewed.   Constitutional:       Appearance: Normal appearance.   HENT:      Head: Normocephalic.   Eyes:      Extraocular Movements: Extraocular movements intact and EOM normal.      Pupils: Pupils are equal, round, and reactive to light.   Cardiovascular:      Rate and Rhythm: Normal rate and regular rhythm.   Pulmonary:      Effort: Pulmonary effort is normal.      Breath sounds: Normal breath sounds.   Musculoskeletal:         General: No swelling or tenderness. Normal range of motion.      Cervical back: Normal range of motion and neck supple.      Right lower leg: No edema.       Left lower leg: No edema.   Skin:     General: Skin is warm and dry.      Coloration: Skin is not jaundiced.      Findings: No rash.   Neurological:      General: No focal deficit present.      Mental Status: She is alert and oriented to person, place, and time.      GCS: GCS eye subscore is 4. GCS verbal subscore is 5. GCS motor subscore is 6.      Cranial Nerves: No cranial nerve deficit.      Sensory: No sensory deficit.      Motor: Motor function is intact. No weakness.      Coordination: Coordination is intact. Coordination normal. Finger-Nose-Finger Test, Heel to Shin Test and Romberg Test normal.      Gait: Gait abnormal and tandem walk abnormal.      Deep Tendon Reflexes: Reflexes normal.      Reflex Scores:       Tricep reflexes are 2+ on the right side and 2+ on the left side.       Bicep reflexes are 2+ on the right side and 2+ on the left side.       Brachioradialis reflexes are 2+ on the right side and 2+ on the left side.       Patellar reflexes are 2+ on the right side and 2+ on the left side.       Achilles reflexes are 2+ on the right side and 2+ on the left side.  Psychiatric:         Mood and Affect: Mood normal.         Speech: Speech normal.         Behavior: Behavior normal.          Assessment:     Problem List Items Addressed This Visit          Neuro    Cerebrovascular accident (CVA) (Chronic)    Left hemiparesis (Chronic)    Nonintractable epilepsy without status epilepticus - Primary (Chronic)       ENT    Vertigo    Relevant Medications    levETIRAcetam (KEPPRA) 500 MG Tab                Primary Diagnosis and ICD10  Nonintractable epilepsy without status epilepticus, unspecified epilepsy type [G40.909]    Plan:     Patient Instructions   1.  Continue current medication including keppra as directed    2. Notify clinic if any seizures    3. Antivert as needed for dizziness      Seizure Precautions:  1. Take medications as directed  2. Avoid open flames and hot surfaces such as fires and  grills  3. Avoid elevations such as ladders or stools  4. Avoid swimming alone  5. Make sure getting plenty of sleep, recommend 7-8 hours per day  6. Avoid alcohol and illicit drugs  7. No driving or operating heavy machinery for 6 months after last known seizure    Stroke risk modifiers:    1. Good sleep habits, recommend at least 7 hours total sleep daily  2. Continue management of blood pressure and cholesterol including medications, exercise, and good eating habits  3. Exercise as much as possible, recommend 5 days of the week for 30 minutes each day  4. Avoid smoking and alcohol  5. Go to the nearest emergency department immediately if any new or worsening weakness, speech difficulty, or numbness    Medications Discontinued During This Encounter   Medication Reason    levETIRAcetam (KEPPRA) 500 MG Tab Reorder             Requested Prescriptions     Signed Prescriptions Disp Refills    levETIRAcetam (KEPPRA) 500 MG Tab 120 tablet 1     Sig: Take 1 tablet (500 mg total) by mouth 2 (two) times daily.       No orders of the defined types were placed in this encounter.

## 2023-12-05 LAB
AORTIC ROOT ANNULUS: 2.42 CM
AV INDEX (PROSTH): 0.69
AV MEAN GRADIENT: 4 MMHG
AV PEAK GRADIENT: 8 MMHG
AV VALVE AREA BY VELOCITY RATIO: 1.9 CM²
AV VALVE AREA: 1.87 CM²
AV VELOCITY RATIO: 0.7
CV ECHO LV RWT: 0.4 CM
DOP CALC AO PEAK VEL: 1.37 M/S
DOP CALC AO VTI: 32.9 CM
DOP CALC LVOT AREA: 2.7 CM2
DOP CALC LVOT DIAMETER: 1.86 CM
DOP CALC LVOT PEAK VEL: 0.96 M/S
DOP CALC LVOT STROKE VOLUME: 61.38 CM3
DOP CALCLVOT PEAK VEL VTI: 22.6 CM
E WAVE DECELERATION TIME: 202.63 MSEC
E/A RATIO: 1.47
E/E' RATIO: 10.24 M/S
ECHO LV POSTERIOR WALL: 0.93 CM (ref 0.6–1.1)
EJECTION FRACTION: 65 %
FRACTIONAL SHORTENING: 25 % (ref 28–44)
INTERVENTRICULAR SEPTUM: 1.01 CM (ref 0.6–1.1)
LEFT ATRIUM SIZE: 4.5 CM
LEFT INTERNAL DIMENSION IN SYSTOLE: 3.45 CM (ref 2.1–4)
LEFT VENTRICLE DIASTOLIC VOLUME: 98.86 ML
LEFT VENTRICLE SYSTOLIC VOLUME: 49.05 ML
LEFT VENTRICULAR INTERNAL DIMENSION IN DIASTOLE: 4.63 CM (ref 3.5–6)
LEFT VENTRICULAR MASS: 153.97 G
LV LATERAL E/E' RATIO: 8.7 M/S
LV SEPTAL E/E' RATIO: 12.43 M/S
LVOT MG: 2.3 MMHG
LVOT MV: 0.72 CM/S
MV PEAK A VEL: 0.59 M/S
MV PEAK E VEL: 0.87 M/S
PISA TR MAX VEL: 2.63 M/S
PV PEAK GRADIENT: 3 MMHG
PV PEAK VELOCITY: 0.93 M/S
RA PRESSURE ESTIMATED: 3 MMHG
RIGHT ATRIAL AREA: 17 CM2
RIGHT VENTRICULAR END-DIASTOLIC DIMENSION: 2.8 CM
RIGHT VENTRICULAR LENGTH IN DIASTOLE (APICAL 4-CHAMBER VIEW): 5.99 CM
RV MID DIAMA: 2.74 CM
RV TB RVSP: 6 MMHG
TDI LATERAL: 0.1 M/S
TDI SEPTAL: 0.07 M/S
TDI: 0.09 M/S
TR MAX PG: 28 MMHG
TRICUSPID ANNULAR PLANE SYSTOLIC EXCURSION: 2.13 CM
TV REST PULMONARY ARTERY PRESSURE: 31 MMHG

## 2023-12-13 ENCOUNTER — APPOINTMENT (OUTPATIENT)
Dept: RADIOLOGY | Facility: CLINIC | Age: 83
End: 2023-12-13
Attending: INTERNAL MEDICINE
Payer: MEDICARE

## 2023-12-13 ENCOUNTER — HOSPITAL ENCOUNTER (INPATIENT)
Facility: HOSPITAL | Age: 83
LOS: 1 days | Discharge: HOME | DRG: 309 | End: 2023-12-15
Attending: HOSPITALIST | Admitting: HOSPITALIST
Payer: MEDICARE

## 2023-12-13 ENCOUNTER — OFFICE VISIT (OUTPATIENT)
Dept: FAMILY MEDICINE | Facility: CLINIC | Age: 83
End: 2023-12-13
Payer: MEDICARE

## 2023-12-13 VITALS
HEIGHT: 57 IN | RESPIRATION RATE: 18 BRPM | DIASTOLIC BLOOD PRESSURE: 74 MMHG | HEART RATE: 127 BPM | SYSTOLIC BLOOD PRESSURE: 127 MMHG | OXYGEN SATURATION: 100 % | TEMPERATURE: 98 F | BODY MASS INDEX: 27.61 KG/M2 | WEIGHT: 128 LBS

## 2023-12-13 DIAGNOSIS — R07.9 CHEST PAIN: ICD-10-CM

## 2023-12-13 DIAGNOSIS — N18.2 CHRONIC KIDNEY DISEASE, STAGE 2 (MILD): ICD-10-CM

## 2023-12-13 DIAGNOSIS — R10.9 ABDOMINAL PAIN: ICD-10-CM

## 2023-12-13 DIAGNOSIS — I48.92 ATRIAL FLUTTER: ICD-10-CM

## 2023-12-13 DIAGNOSIS — R00.0 TACHYCARDIA: ICD-10-CM

## 2023-12-13 DIAGNOSIS — I63.311 CEREBROVASCULAR ACCIDENT (CVA) DUE TO THROMBOSIS OF RIGHT MIDDLE CEREBRAL ARTERY: ICD-10-CM

## 2023-12-13 DIAGNOSIS — I48.3 TYPICAL ATRIAL FLUTTER: Primary | ICD-10-CM

## 2023-12-13 DIAGNOSIS — I48.19 OTHER PERSISTENT ATRIAL FIBRILLATION: ICD-10-CM

## 2023-12-13 DIAGNOSIS — I50.9 CONGESTIVE HEART FAILURE, UNSPECIFIED HF CHRONICITY, UNSPECIFIED HEART FAILURE TYPE: ICD-10-CM

## 2023-12-13 DIAGNOSIS — I49.9 ARRHYTHMIA: ICD-10-CM

## 2023-12-13 DIAGNOSIS — I50.9 CONGESTIVE HEART FAILURE, UNSPECIFIED HF CHRONICITY, UNSPECIFIED HEART FAILURE TYPE: Primary | ICD-10-CM

## 2023-12-13 DIAGNOSIS — G40.909 NONINTRACTABLE EPILEPSY WITHOUT STATUS EPILEPTICUS, UNSPECIFIED EPILEPSY TYPE: ICD-10-CM

## 2023-12-13 DIAGNOSIS — D64.9 CHRONIC ANEMIA: ICD-10-CM

## 2023-12-13 LAB
ALBUMIN SERPL BCP-MCNC: 3.5 G/DL (ref 3.5–5)
ALBUMIN/GLOB SERPL: 1.1 {RATIO}
ALP SERPL-CCNC: 49 U/L (ref 55–142)
ALT SERPL W P-5'-P-CCNC: 19 U/L (ref 13–56)
ANION GAP SERPL CALCULATED.3IONS-SCNC: 11 MMOL/L (ref 7–16)
ANION GAP SERPL CALCULATED.3IONS-SCNC: 13 MMOL/L (ref 7–16)
AST SERPL W P-5'-P-CCNC: 21 U/L (ref 15–37)
BASOPHILS # BLD AUTO: 0.03 K/UL (ref 0–0.2)
BASOPHILS # BLD AUTO: 0.04 K/UL (ref 0–0.2)
BASOPHILS NFR BLD AUTO: 0.8 % (ref 0–1)
BASOPHILS NFR BLD AUTO: 1 % (ref 0–1)
BILIRUB SERPL-MCNC: 0.3 MG/DL (ref ?–1.2)
BILIRUB UR QL STRIP: NEGATIVE
BUN SERPL-MCNC: 13 MG/DL (ref 7–18)
BUN SERPL-MCNC: 14 MG/DL (ref 7–18)
BUN/CREAT SERPL: 14 (ref 6–20)
BUN/CREAT SERPL: 14 (ref 6–20)
CALCIUM SERPL-MCNC: 8.6 MG/DL (ref 8.5–10.1)
CALCIUM SERPL-MCNC: 9.2 MG/DL (ref 8.5–10.1)
CHLORIDE SERPL-SCNC: 100 MMOL/L (ref 98–107)
CHLORIDE SERPL-SCNC: 96 MMOL/L (ref 98–107)
CLARITY UR: CLEAR
CO2 SERPL-SCNC: 23 MMOL/L (ref 21–32)
CO2 SERPL-SCNC: 28 MMOL/L (ref 21–32)
COLOR UR: COLORLESS
CREAT SERPL-MCNC: 0.92 MG/DL (ref 0.55–1.02)
CREAT SERPL-MCNC: 0.99 MG/DL (ref 0.55–1.02)
D DIMER PPP FEU-MCNC: <0.27 ΜG/ML (ref 0–0.47)
DIFFERENTIAL METHOD BLD: ABNORMAL
DIFFERENTIAL METHOD BLD: ABNORMAL
EGFR (NO RACE VARIABLE) (RUSH/TITUS): 57 ML/MIN/1.73M2
EGFR (NO RACE VARIABLE) (RUSH/TITUS): 63 ML/MIN/1.73M2
EKG 12-LEAD: NORMAL
EOSINOPHIL # BLD AUTO: 0.04 K/UL (ref 0–0.5)
EOSINOPHIL # BLD AUTO: 0.04 K/UL (ref 0–0.5)
EOSINOPHIL NFR BLD AUTO: 1 % (ref 1–4)
EOSINOPHIL NFR BLD AUTO: 1.1 % (ref 1–4)
ERYTHROCYTE [DISTWIDTH] IN BLOOD BY AUTOMATED COUNT: 13.7 % (ref 11.5–14.5)
ERYTHROCYTE [DISTWIDTH] IN BLOOD BY AUTOMATED COUNT: 13.9 % (ref 11.5–14.5)
GLOBULIN SER-MCNC: 3.2 G/DL (ref 2–4)
GLUCOSE SERPL-MCNC: 94 MG/DL (ref 74–106)
GLUCOSE SERPL-MCNC: 97 MG/DL (ref 74–106)
GLUCOSE UR STRIP-MCNC: NORMAL MG/DL
HCT VFR BLD AUTO: 24.8 % (ref 38–47)
HCT VFR BLD AUTO: 25.3 % (ref 38–47)
HEART RATE: 128
HGB BLD-MCNC: 8.3 G/DL (ref 12–16)
HGB BLD-MCNC: 8.5 G/DL (ref 12–16)
IMM GRANULOCYTES # BLD AUTO: 0.01 K/UL (ref 0–0.04)
IMM GRANULOCYTES # BLD AUTO: 0.01 K/UL (ref 0–0.04)
IMM GRANULOCYTES NFR BLD: 0.3 % (ref 0–0.4)
IMM GRANULOCYTES NFR BLD: 0.3 % (ref 0–0.4)
KETONES UR STRIP-SCNC: NEGATIVE MG/DL
LEUKOCYTE ESTERASE UR QL STRIP: NEGATIVE
LYMPHOCYTES # BLD AUTO: 0.74 K/UL (ref 1–4.8)
LYMPHOCYTES # BLD AUTO: 0.82 K/UL (ref 1–4.8)
LYMPHOCYTES NFR BLD AUTO: 19 % (ref 27–41)
LYMPHOCYTES NFR BLD AUTO: 22.2 % (ref 27–41)
Lab: NORMAL
MCH RBC QN AUTO: 30.1 PG (ref 27–31)
MCH RBC QN AUTO: 30.5 PG (ref 27–31)
MCHC RBC AUTO-ENTMCNC: 33.5 G/DL (ref 32–36)
MCHC RBC AUTO-ENTMCNC: 33.6 G/DL (ref 32–36)
MCV RBC AUTO: 89.9 FL (ref 80–96)
MCV RBC AUTO: 90.7 FL (ref 80–96)
MONOCYTES # BLD AUTO: 0.25 K/UL (ref 0–0.8)
MONOCYTES # BLD AUTO: 0.25 K/UL (ref 0–0.8)
MONOCYTES NFR BLD AUTO: 6.4 % (ref 2–6)
MONOCYTES NFR BLD AUTO: 6.8 % (ref 2–6)
MPC BLD CALC-MCNC: 8.3 FL (ref 9.4–12.4)
MPC BLD CALC-MCNC: 8.4 FL (ref 9.4–12.4)
NEUTROPHILS # BLD AUTO: 2.54 K/UL (ref 1.8–7.7)
NEUTROPHILS # BLD AUTO: 2.81 K/UL (ref 1.8–7.7)
NEUTROPHILS NFR BLD AUTO: 68.8 % (ref 53–65)
NEUTROPHILS NFR BLD AUTO: 72.3 % (ref 53–65)
NITRITE UR QL STRIP: NEGATIVE
NRBC # BLD AUTO: 0 X10E3/UL
NRBC # BLD AUTO: 0 X10E3/UL
NRBC, AUTO (.00): 0 %
NRBC, AUTO (.00): 0 %
NT-PROBNP SERPL-MCNC: 3109 PG/ML (ref 1–450)
NT-PROBNP SERPL-MCNC: 3441 PG/ML (ref 1–450)
PH UR STRIP: 6 PH UNITS
PLATELET # BLD AUTO: 314 K/UL (ref 150–400)
PLATELET # BLD AUTO: 360 K/UL (ref 150–400)
POTASSIUM SERPL-SCNC: 3.6 MMOL/L (ref 3.5–5.1)
POTASSIUM SERPL-SCNC: 4 MMOL/L (ref 3.5–5.1)
PR INTERVAL: 119
PROT SERPL-MCNC: 6.7 G/DL (ref 6.4–8.2)
PROT UR QL STRIP: NEGATIVE
PRT AXES: NORMAL
QRS DURATION: 76
QT/QTC: NORMAL
RBC # BLD AUTO: 2.76 M/UL (ref 4.2–5.4)
RBC # BLD AUTO: 2.79 M/UL (ref 4.2–5.4)
RBC # UR STRIP: NEGATIVE /UL
SARS-COV-2 RDRP RESP QL NAA+PROBE: NEGATIVE
SODIUM SERPL-SCNC: 131 MMOL/L (ref 136–145)
SODIUM SERPL-SCNC: 132 MMOL/L (ref 136–145)
SP GR UR STRIP: 1.01
TROPONIN I SERPL DL<=0.01 NG/ML-MCNC: 16.4 PG/ML
TROPONIN I SERPL DL<=0.01 NG/ML-MCNC: 16.5 PG/ML
UROBILINOGEN UR STRIP-ACNC: NORMAL MG/DL
VENTRICULAR RATE: NORMAL
WBC # BLD AUTO: 3.69 K/UL (ref 4.5–11)
WBC # BLD AUTO: 3.89 K/UL (ref 4.5–11)

## 2023-12-13 PROCEDURE — G0378 HOSPITAL OBSERVATION PER HR: HCPCS

## 2023-12-13 PROCEDURE — 85025 COMPLETE CBC W/AUTO DIFF WBC: CPT | Mod: ,,, | Performed by: CLINICAL MEDICAL LABORATORY

## 2023-12-13 PROCEDURE — 96365 THER/PROPH/DIAG IV INF INIT: CPT

## 2023-12-13 PROCEDURE — 99222 1ST HOSP IP/OBS MODERATE 55: CPT | Mod: ,,, | Performed by: HOSPITALIST

## 2023-12-13 PROCEDURE — 99285 EMERGENCY DEPT VISIT HI MDM: CPT | Mod: ,,, | Performed by: NURSE PRACTITIONER

## 2023-12-13 PROCEDURE — 80048 BASIC METABOLIC PANEL: ICD-10-PCS | Mod: XB,,, | Performed by: CLINICAL MEDICAL LABORATORY

## 2023-12-13 PROCEDURE — 93010 POCT EKG 12-LEAD: ICD-10-PCS | Mod: ,,, | Performed by: INTERNAL MEDICINE

## 2023-12-13 PROCEDURE — 80053 COMPREHEN METABOLIC PANEL: CPT | Performed by: NURSE PRACTITIONER

## 2023-12-13 PROCEDURE — 85025 CBC WITH DIFFERENTIAL: ICD-10-PCS | Mod: ,,, | Performed by: CLINICAL MEDICAL LABORATORY

## 2023-12-13 PROCEDURE — 25000003 PHARM REV CODE 250: Performed by: NURSE PRACTITIONER

## 2023-12-13 PROCEDURE — 99223 1ST HOSP IP/OBS HIGH 75: CPT | Mod: ,,, | Performed by: STUDENT IN AN ORGANIZED HEALTH CARE EDUCATION/TRAINING PROGRAM

## 2023-12-13 PROCEDURE — 81003 URINALYSIS AUTO W/O SCOPE: CPT | Performed by: NURSE PRACTITIONER

## 2023-12-13 PROCEDURE — 93005 ELECTROCARDIOGRAM TRACING: CPT

## 2023-12-13 PROCEDURE — 87635 SARS-COV-2 COVID-19 AMP PRB: CPT | Performed by: HOSPITALIST

## 2023-12-13 PROCEDURE — 99214 OFFICE O/P EST MOD 30 MIN: CPT | Mod: ,,, | Performed by: INTERNAL MEDICINE

## 2023-12-13 PROCEDURE — 85025 COMPLETE CBC W/AUTO DIFF WBC: CPT | Performed by: NURSE PRACTITIONER

## 2023-12-13 PROCEDURE — 93005 ELECTROCARDIOGRAM TRACING: CPT | Mod: RHCUB | Performed by: INTERNAL MEDICINE

## 2023-12-13 PROCEDURE — 25000003 PHARM REV CODE 250: Performed by: HOSPITALIST

## 2023-12-13 PROCEDURE — 99285 PR EMERGENCY DEPT VISIT,LEVEL V: ICD-10-PCS | Mod: ,,, | Performed by: NURSE PRACTITIONER

## 2023-12-13 PROCEDURE — 94761 N-INVAS EAR/PLS OXIMETRY MLT: CPT

## 2023-12-13 PROCEDURE — 99223 PR INITIAL HOSPITAL CARE,LEVL III: ICD-10-PCS | Mod: ,,, | Performed by: STUDENT IN AN ORGANIZED HEALTH CARE EDUCATION/TRAINING PROGRAM

## 2023-12-13 PROCEDURE — 93010 ELECTROCARDIOGRAM REPORT: CPT | Mod: ,,, | Performed by: INTERNAL MEDICINE

## 2023-12-13 PROCEDURE — 99222 PR INITIAL HOSPITAL CARE,LEVL II: ICD-10-PCS | Mod: ,,, | Performed by: HOSPITALIST

## 2023-12-13 PROCEDURE — 83880 NT-PRO NATRIURETIC PEPTIDE: ICD-10-PCS | Mod: ,,, | Performed by: CLINICAL MEDICAL LABORATORY

## 2023-12-13 PROCEDURE — 99214 PR OFFICE/OUTPT VISIT, EST, LEVL IV, 30-39 MIN: ICD-10-PCS | Mod: ,,, | Performed by: INTERNAL MEDICINE

## 2023-12-13 PROCEDURE — 83880 ASSAY OF NATRIURETIC PEPTIDE: CPT | Mod: ,,, | Performed by: CLINICAL MEDICAL LABORATORY

## 2023-12-13 PROCEDURE — 83880 ASSAY OF NATRIURETIC PEPTIDE: CPT | Performed by: NURSE PRACTITIONER

## 2023-12-13 PROCEDURE — 80048 BASIC METABOLIC PNL TOTAL CA: CPT | Mod: XB,,, | Performed by: CLINICAL MEDICAL LABORATORY

## 2023-12-13 PROCEDURE — 99285 EMERGENCY DEPT VISIT HI MDM: CPT | Mod: 25

## 2023-12-13 PROCEDURE — 84484 ASSAY OF TROPONIN QUANT: CPT | Performed by: NURSE PRACTITIONER

## 2023-12-13 PROCEDURE — 71046 X-RAY EXAM CHEST 2 VIEWS: CPT | Mod: TC,RHCUB | Performed by: INTERNAL MEDICINE

## 2023-12-13 PROCEDURE — 93010 EKG 12-LEAD: ICD-10-PCS | Mod: 77,,, | Performed by: STUDENT IN AN ORGANIZED HEALTH CARE EDUCATION/TRAINING PROGRAM

## 2023-12-13 PROCEDURE — 93010 ELECTROCARDIOGRAM REPORT: CPT | Mod: 77,,, | Performed by: STUDENT IN AN ORGANIZED HEALTH CARE EDUCATION/TRAINING PROGRAM

## 2023-12-13 RX ORDER — ATORVASTATIN CALCIUM 20 MG/1
20 TABLET, FILM COATED ORAL DAILY
Status: DISCONTINUED | OUTPATIENT
Start: 2023-12-14 | End: 2023-12-15 | Stop reason: HOSPADM

## 2023-12-13 RX ORDER — TRAZODONE HYDROCHLORIDE 50 MG/1
50 TABLET ORAL NIGHTLY PRN
Status: DISCONTINUED | OUTPATIENT
Start: 2023-12-13 | End: 2023-12-15 | Stop reason: HOSPADM

## 2023-12-13 RX ORDER — TALC
6 POWDER (GRAM) TOPICAL NIGHTLY PRN
Status: DISCONTINUED | OUTPATIENT
Start: 2023-12-13 | End: 2023-12-15 | Stop reason: HOSPADM

## 2023-12-13 RX ORDER — GUAIFENESIN/DEXTROMETHORPHAN 100-10MG/5
10 SYRUP ORAL EVERY 6 HOURS PRN
Status: DISCONTINUED | OUTPATIENT
Start: 2023-12-13 | End: 2023-12-15 | Stop reason: HOSPADM

## 2023-12-13 RX ORDER — ACETAMINOPHEN 500 MG
1000 TABLET ORAL EVERY 6 HOURS PRN
Status: DISCONTINUED | OUTPATIENT
Start: 2023-12-13 | End: 2023-12-15 | Stop reason: HOSPADM

## 2023-12-13 RX ORDER — PANTOPRAZOLE SODIUM 40 MG/1
40 TABLET, DELAYED RELEASE ORAL DAILY
Status: DISCONTINUED | OUTPATIENT
Start: 2023-12-14 | End: 2023-12-15 | Stop reason: HOSPADM

## 2023-12-13 RX ORDER — SIMETHICONE 80 MG
1 TABLET,CHEWABLE ORAL 3 TIMES DAILY PRN
Status: DISCONTINUED | OUTPATIENT
Start: 2023-12-13 | End: 2023-12-15 | Stop reason: HOSPADM

## 2023-12-13 RX ORDER — SODIUM CHLORIDE 9 MG/ML
INJECTION, SOLUTION INTRAVENOUS CONTINUOUS
Status: DISCONTINUED | OUTPATIENT
Start: 2023-12-13 | End: 2023-12-15 | Stop reason: HOSPADM

## 2023-12-13 RX ORDER — LEVETIRACETAM 500 MG/1
500 TABLET ORAL 2 TIMES DAILY
Status: DISCONTINUED | OUTPATIENT
Start: 2023-12-13 | End: 2023-12-15 | Stop reason: HOSPADM

## 2023-12-13 RX ORDER — METOPROLOL TARTRATE 25 MG/1
25 TABLET, FILM COATED ORAL 2 TIMES DAILY
Status: DISCONTINUED | OUTPATIENT
Start: 2023-12-13 | End: 2023-12-14

## 2023-12-13 RX ORDER — BISACODYL 5 MG
10 TABLET, DELAYED RELEASE (ENTERIC COATED) ORAL DAILY PRN
Status: DISCONTINUED | OUTPATIENT
Start: 2023-12-13 | End: 2023-12-15 | Stop reason: HOSPADM

## 2023-12-13 RX ORDER — ONDANSETRON 2 MG/ML
8 INJECTION INTRAMUSCULAR; INTRAVENOUS EVERY 6 HOURS PRN
Status: DISCONTINUED | OUTPATIENT
Start: 2023-12-13 | End: 2023-12-15 | Stop reason: HOSPADM

## 2023-12-13 RX ORDER — METOPROLOL TARTRATE 25 MG/1
12.5 TABLET ORAL
Status: COMPLETED | OUTPATIENT
Start: 2023-12-13 | End: 2023-12-13

## 2023-12-13 RX ADMIN — APIXABAN 5 MG: 5 TABLET, FILM COATED ORAL at 09:12

## 2023-12-13 RX ADMIN — METOPROLOL TARTRATE 25 MG: 25 TABLET, FILM COATED ORAL at 05:12

## 2023-12-13 RX ADMIN — SODIUM CHLORIDE: 9 INJECTION, SOLUTION INTRAVENOUS at 09:12

## 2023-12-13 RX ADMIN — METOPROLOL TARTRATE 12.5 MG: 25 TABLET, FILM COATED ORAL at 01:12

## 2023-12-13 RX ADMIN — LEVETIRACETAM 500 MG: 500 TABLET, FILM COATED ORAL at 09:12

## 2023-12-13 NOTE — Clinical Note
Diagnosis: Atrial flutter [427.32.ICD-9-CM]   Future Attending Provider: SLIM CANDELARIO [[id number]]   Admitting Provider:: SLIM CANDELARIO [[id number]]

## 2023-12-13 NOTE — PROGRESS NOTES
Pt sent to ER for CHF exacerbation per Dr Culver; EKG and lab drawn in clinic today; pt also had chest xray done in clinic today; pt has sinus tachycardia with heart rate of 128; pt will arrive to [facility] ED POV with .

## 2023-12-13 NOTE — ED PROVIDER NOTES
Encounter Date: 12/13/2023       History     Chief Complaint   Patient presents with    Chest Pain    Dizziness     83-year-old female presents to ED chest pain and dizziness.  Patient saw PCP on this morning with spouse reporting patient was having complaint of chest pain and was referred to ED for further evaluation.  Spouse reports patient has not been taking medication for heart due to medication lowering blood pressure.  Patient has a history atrial fib/flutter, CVA, diabetes, hypertension, hyperlipidemia, epilepsy.  Patient with a rate of 120s in office this EKG performed.  Denies fever, chills, shortness of breath.    The history is provided by the spouse, the patient and medical records.     Review of patient's allergies indicates:  No Known Allergies  Past Medical History:   Diagnosis Date    Atrial fibrillation with RVR 10/06/2022    Atrial flutter 10/17/2022    followed by Dr. Alcazar    Chronic anemia     baseline hgb 8 to 9    Chronic kidney disease, stage 2 (mild)     baseline creat 1.1    Epilepsy, unspecified, not intractable, without status epilepticus     followed by Jj Goodrich NP, [facility] neurology    Gastroesophageal reflux disease 04/20/2023    Paroxysmal atrial fibrillation 11/07/2022    Stroke      History reviewed. No pertinent surgical history.  History reviewed. No pertinent family history.  Social History     Tobacco Use    Smoking status: Never     Passive exposure: Never    Smokeless tobacco: Never   Substance Use Topics    Alcohol use: Never    Drug use: Never     Review of Systems   Constitutional:  Negative for chills and fever.   Respiratory:  Negative for cough and shortness of breath.    Cardiovascular:  Positive for chest pain. Negative for palpitations.   Gastrointestinal:  Negative for nausea and vomiting.   Genitourinary:  Negative for difficulty urinating and dysuria.   Musculoskeletal:  Negative for arthralgias and gait problem.   Skin:  Negative for color change and wound.    Neurological:  Positive for dizziness. Negative for weakness.   Hematological:  Negative for adenopathy. Does not bruise/bleed easily.   Psychiatric/Behavioral:  Negative for agitation and confusion.    All other systems reviewed and are negative.      Physical Exam     Initial Vitals [12/13/23 1223]   BP Pulse Resp Temp SpO2   117/73 (!) 131 16 99 °F (37.2 °C) 100 %      MAP       --         Physical Exam    Nursing note and vitals reviewed.  Constitutional: She appears well-developed and well-nourished.   HENT:   Head: Normocephalic and atraumatic.   Eyes: EOM are normal. Pupils are equal, round, and reactive to light.   Neck: Neck supple.   Normal range of motion.  Cardiovascular:  Regular rhythm.           No murmur heard.  Pulmonary/Chest: She has no wheezes. She has no rhonchi.   Abdominal: Abdomen is soft. She exhibits no distension. There is generalized abdominal tenderness.   Musculoskeletal:         General: No tenderness or edema.      Cervical back: Normal range of motion and neck supple.     Lymphadenopathy:     She has no cervical adenopathy.   Neurological: She is alert and oriented to person, place, and time. No cranial nerve deficit or sensory deficit.   Skin: Skin is warm and dry. Capillary refill takes less than 2 seconds.   Psychiatric: She has a normal mood and affect. Thought content normal.         Medical Screening Exam   See Full Note    ED Course   Procedures  Labs Reviewed   COMPREHENSIVE METABOLIC PANEL - Abnormal; Notable for the following components:       Result Value    Sodium 132 (*)     Alk Phos 49 (*)     All other components within normal limits   NT-PRO NATRIURETIC PEPTIDE - Abnormal; Notable for the following components:    ProBNP 3,109 (*)     All other components within normal limits   CBC WITH DIFFERENTIAL - Abnormal; Notable for the following components:    WBC 3.69 (*)     RBC 2.79 (*)     Hemoglobin 8.5 (*)     Hematocrit 25.3 (*)     MPV 8.3 (*)     Neutrophils % 68.8  (*)     Lymphocytes % 22.2 (*)     Monocytes % 6.8 (*)     Lymphocytes, Absolute 0.82 (*)     All other components within normal limits   BASIC METABOLIC PANEL - Abnormal; Notable for the following components:    Glucose 112 (*)     All other components within normal limits   MAGNESIUM - Abnormal; Notable for the following components:    Magnesium 1.6 (*)     All other components within normal limits   LIPID PANEL - Abnormal; Notable for the following components:    HDL Cholesterol 74 (*)     All other components within normal limits   IRON AND TIBC - Abnormal; Notable for the following components:    Iron 31 (*)     Iron Saturation 11 (*)     All other components within normal limits   CBC WITH DIFFERENTIAL - Abnormal; Notable for the following components:    WBC 3.63 (*)     RBC 2.72 (*)     Hemoglobin 8.2 (*)     Hematocrit 24.6 (*)     MPV 8.2 (*)     Monocytes % 6.6 (*)     Basophils % 1.4 (*)     All other components within normal limits   POCT GLUCOSE MONITORING CONTINUOUS - Abnormal; Notable for the following components:    POC Glucose 108 (*)     All other components within normal limits   POCT GLUCOSE MONITORING CONTINUOUS - Abnormal; Notable for the following components:    POC Glucose 115 (*)     All other components within normal limits   POCT GLUCOSE MONITORING CONTINUOUS - Abnormal; Notable for the following components:    POC Glucose 163 (*)     All other components within normal limits   TROPONIN I - Normal   TROPONIN I - Normal   SARS-COV-2 RNA AMPLIFICATION, QUAL - Normal    Narrative:     Negative SARS-CoV results should not be used as the sole basis for treatment or patient management decisions; negative results should be considered in the context of a patient's recent exposures, history and the presene of clinical signs and symptoms consistent with COVID-19.  Negative results should be treated as presumptive and confirmed by molecular assay, if necessary for patient management.   APTT - Normal    PROTIME-INR - Normal   TSH - Normal   VITAMIN B12/FOLATE, SERUM PANEL - Normal   FERRITIN - Normal   LACTATE DEHYDROGENASE - Normal   HAPTOGLOBIN - Normal   CBC W/ AUTO DIFFERENTIAL    Narrative:     The following orders were created for panel order CBC auto differential.  Procedure                               Abnormality         Status                     ---------                               -----------         ------                     CBC with Differential[[id number]]       Abnormal            Final result                 Please view results for these tests on the individual orders.   URINALYSIS, REFLEX TO URINE CULTURE   CBC W/ AUTO DIFFERENTIAL    Narrative:     The following orders were created for panel order CBC Auto Differential.  Procedure                               Abnormality         Status                     ---------                               -----------         ------                     CBC with Differential[[id number]]       Abnormal            Final result                 Please view results for these tests on the individual orders.   HEMOGLOBIN A1C   EXTRA TUBES    Narrative:     The following orders were created for panel order EXTRA TUBES.  Procedure                               Abnormality         Status                     ---------                               -----------         ------                     Light Green Top Hold[[id number]]                            In process                 Lavender Top Hold[[id number]]                               In process                   Please view results for these tests on the individual orders.   LIGHT GREEN TOP HOLD   LAVENDER TOP HOLD   DIRECT ANTIGLOBULIN TEST   TYPE & SCREEN   POCT OCCULT BLOOD (STOOL)        ECG Results    None       Imaging Results              CTA Chest Aorta Non Coronary (Final result)  Result time 12/14/23 18:04:44      Final result by Theo Cummings DO (12/14/23 18:04:44)                    Impression:      The ascending aorta measures 4.2 x 4.0 cm, normal in size. The aortic arch measures 2.8 cm. The thoracic aorta is normal in size measuring 2.3 cm.    Indeterminate cystic lesion arising from the superior pole right kidney measuring up to 1.6 cm.  CT or MRI with renal mass protocol recommended.      Electronically signed by: Theo Cummings  Date:    12/14/2023  Time:    18:04               Narrative:    EXAMINATION:  CTA CHEST AORTA NON CORONARY    CLINICAL HISTORY:  Aortic aneurysm, known or suspected;    TECHNIQUE:  Multiplane CTA CHEST AORTA NON CORONARY, 100 cc of Isovue 370.    COMPARISON:  12/13/23    FINDINGS:  1 cm nodule within the posterior right-sided thyroid lobe.    The ascending aorta measures 4.2 x 4.0 cm, normal in size.  The aortic arch measures 2.8 cm.  The thoracic aorta is normal in size measuring 2.3 cm.    Mild-to-moderate cardiomegaly.  Calcified vascular disease.  No lymphadenopathy.  Small bilateral pleural effusions.  Lungs are clear.  Pleura otherwise normal.    Multilevel degenerative change.  Cyst or hemangioma within the right hepatic lobe measuring 1.0 cm.  Indeterminate cystic lesion arising from the superior pole right kidney measuring up to 1.6 cm.  Upper abdomen otherwise normal.                                       X-Ray Abdomen AP 1 View (KUB) (Final result)  Result time 12/13/23 13:18:26      Final result by Mariano Garner II, MD (12/13/23 13:18:26)                   Impression:      No evidence of abnormality demonstrated      Electronically signed by: Mariano Garner  Date:    12/13/2023  Time:    13:18               Narrative:    EXAMINATION:  XR ABDOMEN AP 1 VIEW    CLINICAL HISTORY:  Abdominal pain    COMPARISON:  None available    TECHNIQUE:  XR ABDOMEN AP 1 VIEW    FINDINGS:  No free fluid or free air seen.  The bowel gas pattern appears within normal limits.  No abnormal calcifications are present.  No other abnormality is identified.                                        Medications   amiodarone 360 mg/200 mL (1.8 mg/mL) infusion (1 mg/min Intravenous New Bag 12/14/23 1426)   metoprolol tartrate (LOPRESSOR) split tablet 12.5 mg (12.5 mg Oral Given 12/13/23 1321)   magnesium sulfate in dextrose IVPB (premix) 1 g (0 g Intravenous Stopped 12/14/23 0748)   amiodarone in dextrose 150 mg/100 mL (1.5 mg/mL) loading dose 150 mg (0 mg Intravenous Stopped 12/14/23 1415)   iopamidoL (ISOVUE-370) injection 100 mL (100 mLs Intravenous Given 12/14/23 4813)     Medical Decision Making  83-year-old female presents to ED chest pain and dizziness.  Patient saw PCP on this morning with spouse reporting patient was having complaint of chest pain and was referred to ED for further evaluation.  Spouse reports patient has not been taking medication for heart due to medication lowering blood pressure.  Patient has a history atrial fib/flutter, CVA, diabetes, hypertension, hyperlipidemia, epilepsy.  Patient with a rate of 120s in office this EKG performed.  Denies fever, chills, shortness of breath.    Labs, diagnostics obtained. PO Metoprolol administered. Case discussed with Hospital Medicine for admission    Amount and/or Complexity of Data Reviewed  Labs: ordered.     Details: Sodium 132 (*)  Alk Phos 49 (*)  All other components within normal limits  NT-PRO NATRIURETIC PEPTIDE - Abnormal; Notable for the following components:  ProBNP 3,109 (*)  All other components within normal limits  CBC WITH DIFFERENTIAL - Abnormal; Notable for the following components:  WBC 3.69 (*)  RBC 2.79 (*)  Hemoglobin 8.5 (*)  Hematocrit 25.3 (*)  MPV 8.3 (*)  Neutrophils % 68.8 (*)  Lymphocytes % 22.2 (*)  Monocytes % 6.8 (*)  Lymphocytes, Absolute 0.82 (*)  All other components within normal limits  BASIC METABOLIC PANEL - Abnormal; Notable for the following components:  Glucose 112 (*)  All other components within normal limits  MAGNESIUM - Abnormal; Notable for the following  components:  Magnesium 1.6 (*)  All other components within normal limits  LIPID PANEL - Abnormal; Notable for the following components:  HDL Cholesterol 74 (*)  All other components within normal limits  IRON AND TIBC - Abnormal; Notable for the following components:  Iron 31 (*)  Iron Saturation 11 (*)  All other components within normal limits  CBC WITH DIFFERENTIAL - Abnormal; Notable for the following components:  WBC 3.63 (*)  RBC 2.72 (*)  Hemoglobin 8.2 (*)  Hematocrit 24.6 (*)  MPV 8.2 (*)  Monocytes % 6.6 (*)  Basophils % 1.4 (*)  All other components within normal limits  POCT GLUCOSE MONITORING CONTINUOUS - Abnormal; Notable for the following components:  POC Glucose 108 (*)  All other components within normal limits  POCT GLUCOSE MONITORING CONTINUOUS - Abnormal; Notable for the following components:  POC Glucose 115 (*)  All other components within normal limits  POCT GLUCOSE MONITORING CONTINUOUS - Abnormal; Notable for the following components:  POC Glucose 163 (*)    Radiology: ordered.     Details: No evidence of abnormality demonstrated    ECG/medicine tests: ordered.     Details: Sinus tachycardia    Risk  Prescription drug management.  Decision regarding hospitalization.                                      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

## 2023-12-14 PROBLEM — I63.9 STROKE: Status: ACTIVE | Noted: 2023-12-14

## 2023-12-14 PROBLEM — D64.9 CHRONIC ANEMIA: Status: ACTIVE | Noted: 2023-12-14

## 2023-12-14 PROBLEM — D64.9 CHRONIC ANEMIA: Status: RESOLVED | Noted: 2023-12-14 | Resolved: 2023-12-14

## 2023-12-14 PROBLEM — N18.2 CHRONIC KIDNEY DISEASE, STAGE 2 (MILD): Status: ACTIVE | Noted: 2023-12-14

## 2023-12-14 PROBLEM — K21.9 GASTROESOPHAGEAL REFLUX DISEASE: Status: RESOLVED | Noted: 2023-04-20 | Resolved: 2023-12-14

## 2023-12-14 PROBLEM — G40.909 EPILEPSY, UNSPECIFIED, NOT INTRACTABLE, WITHOUT STATUS EPILEPTICUS: Status: ACTIVE | Noted: 2023-12-14

## 2023-12-14 PROBLEM — E78.5 DYSLIPIDEMIA: Status: ACTIVE | Noted: 2023-12-14

## 2023-12-14 LAB
ANION GAP SERPL CALCULATED.3IONS-SCNC: 10 MMOL/L (ref 7–16)
APTT PPP: 36.1 SECONDS (ref 25.2–37.3)
BASOPHILS # BLD AUTO: 0.05 K/UL (ref 0–0.2)
BASOPHILS NFR BLD AUTO: 1.4 % (ref 0–1)
BUN SERPL-MCNC: 13 MG/DL (ref 7–18)
BUN/CREAT SERPL: 15 (ref 6–20)
CALCIUM SERPL-MCNC: 8.6 MG/DL (ref 8.5–10.1)
CHLORIDE SERPL-SCNC: 105 MMOL/L (ref 98–107)
CHOLEST SERPL-MCNC: 155 MG/DL (ref 0–200)
CHOLEST/HDLC SERPL: 2.1 {RATIO}
CO2 SERPL-SCNC: 25 MMOL/L (ref 21–32)
CREAT SERPL-MCNC: 0.89 MG/DL (ref 0.55–1.02)
DIFFERENTIAL METHOD BLD: ABNORMAL
DIRECT COOMBS (DAT): NORMAL
EGFR (NO RACE VARIABLE) (RUSH/TITUS): 65 ML/MIN/1.73M2
EOSINOPHIL # BLD AUTO: 0.08 K/UL (ref 0–0.5)
EOSINOPHIL NFR BLD AUTO: 2.2 % (ref 1–4)
ERYTHROCYTE [DISTWIDTH] IN BLOOD BY AUTOMATED COUNT: 14.1 % (ref 11.5–14.5)
EST. AVERAGE GLUCOSE BLD GHB EST-MCNC: 105 MG/DL
FERRITIN SERPL-MCNC: 38 NG/ML (ref 8–252)
FOLATE SERPL-MCNC: 5.8 NG/ML (ref 3.1–17.5)
GLUCOSE SERPL-MCNC: 108 MG/DL (ref 70–105)
GLUCOSE SERPL-MCNC: 112 MG/DL (ref 74–106)
GLUCOSE SERPL-MCNC: 115 MG/DL (ref 70–105)
GLUCOSE SERPL-MCNC: 146 MG/DL (ref 70–105)
GLUCOSE SERPL-MCNC: 163 MG/DL (ref 70–105)
HAPTOGLOB SERPL NEPH-MCNC: 100 MG/DL (ref 30–200)
HBA1C MFR BLD HPLC: 5.3 % (ref 4.5–6.6)
HCT VFR BLD AUTO: 24.6 % (ref 38–47)
HDLC SERPL-MCNC: 74 MG/DL (ref 40–60)
HGB BLD-MCNC: 8.2 G/DL (ref 12–16)
IMM GRANULOCYTES # BLD AUTO: 0.01 K/UL (ref 0–0.04)
IMM GRANULOCYTES NFR BLD: 0.3 % (ref 0–0.4)
INDIRECT COOMBS: NORMAL
INR BLD: 1.12
IRON SATN MFR SERPL: 11 % (ref 14–50)
IRON SERPL-MCNC: 31 ΜG/DL (ref 50–170)
LDH SERPL-CCNC: 178 U/L (ref 87–241)
LDLC SERPL CALC-MCNC: 64 MG/DL
LYMPHOCYTES # BLD AUTO: 1.12 K/UL (ref 1–4.8)
LYMPHOCYTES NFR BLD AUTO: 30.9 % (ref 27–41)
MAGNESIUM SERPL-MCNC: 1.6 MG/DL (ref 1.7–2.3)
MCH RBC QN AUTO: 30.1 PG (ref 27–31)
MCHC RBC AUTO-ENTMCNC: 33.3 G/DL (ref 32–36)
MCV RBC AUTO: 90.4 FL (ref 80–96)
MONOCYTES # BLD AUTO: 0.24 K/UL (ref 0–0.8)
MONOCYTES NFR BLD AUTO: 6.6 % (ref 2–6)
MPC BLD CALC-MCNC: 8.2 FL (ref 9.4–12.4)
NEUTROPHILS # BLD AUTO: 2.13 K/UL (ref 1.8–7.7)
NEUTROPHILS NFR BLD AUTO: 58.6 % (ref 53–65)
NONHDLC SERPL-MCNC: 81 MG/DL
NRBC # BLD AUTO: 0 X10E3/UL
NRBC, AUTO (.00): 0 %
PLATELET # BLD AUTO: 334 K/UL (ref 150–400)
POC OCCULT BLOOD STOOL: NEGATIVE
POTASSIUM SERPL-SCNC: 3.8 MMOL/L (ref 3.5–5.1)
PROTHROMBIN TIME: 14.3 SECONDS (ref 11.7–14.7)
RBC # BLD AUTO: 2.72 M/UL (ref 4.2–5.4)
RH BLD: NORMAL
SODIUM SERPL-SCNC: 136 MMOL/L (ref 136–145)
SPECIMEN OUTDATE: NORMAL
TIBC SERPL-MCNC: 282 ΜG/DL (ref 250–450)
TRIGL SERPL-MCNC: 84 MG/DL (ref 35–150)
TSH SERPL DL<=0.005 MIU/L-ACNC: 0.93 UIU/ML (ref 0.36–3.74)
VIT B12 SERPL-MCNC: 414 PG/ML (ref 193–986)
VLDLC SERPL-MCNC: 17 MG/DL
WBC # BLD AUTO: 3.63 K/UL (ref 4.5–11)

## 2023-12-14 PROCEDURE — 93010 EKG 12-LEAD: ICD-10-PCS | Mod: ,,, | Performed by: STUDENT IN AN ORGANIZED HEALTH CARE EDUCATION/TRAINING PROGRAM

## 2023-12-14 PROCEDURE — 85610 PROTHROMBIN TIME: CPT | Performed by: HOSPITALIST

## 2023-12-14 PROCEDURE — 82746 ASSAY OF FOLIC ACID SERUM: CPT | Performed by: HOSPITALIST

## 2023-12-14 PROCEDURE — 82728 ASSAY OF FERRITIN: CPT | Performed by: HOSPITALIST

## 2023-12-14 PROCEDURE — 83550 IRON BINDING TEST: CPT | Performed by: HOSPITALIST

## 2023-12-14 PROCEDURE — 83036 HEMOGLOBIN GLYCOSYLATED A1C: CPT | Performed by: HOSPITALIST

## 2023-12-14 PROCEDURE — 82962 GLUCOSE BLOOD TEST: CPT

## 2023-12-14 PROCEDURE — 83020 HEMOGLOBIN ELECTROPHORESIS: CPT | Performed by: HOSPITALIST

## 2023-12-14 PROCEDURE — 63600175 PHARM REV CODE 636 W HCPCS: Performed by: HOSPITALIST

## 2023-12-14 PROCEDURE — G0378 HOSPITAL OBSERVATION PER HR: HCPCS

## 2023-12-14 PROCEDURE — 82607 VITAMIN B-12: CPT | Performed by: HOSPITALIST

## 2023-12-14 PROCEDURE — 85347 COAGULATION TIME ACTIVATED: CPT

## 2023-12-14 PROCEDURE — 25000003 PHARM REV CODE 250: Performed by: HOSPITALIST

## 2023-12-14 PROCEDURE — 83010 ASSAY OF HAPTOGLOBIN QUANT: CPT | Performed by: HOSPITALIST

## 2023-12-14 PROCEDURE — 99232 PR SUBSEQUENT HOSPITAL CARE,LEVL II: ICD-10-PCS | Mod: ,,, | Performed by: HOSPITALIST

## 2023-12-14 PROCEDURE — 86850 RBC ANTIBODY SCREEN: CPT | Performed by: HOSPITALIST

## 2023-12-14 PROCEDURE — 63600175 PHARM REV CODE 636 W HCPCS: Performed by: NURSE PRACTITIONER

## 2023-12-14 PROCEDURE — 85730 THROMBOPLASTIN TIME PARTIAL: CPT | Performed by: HOSPITALIST

## 2023-12-14 PROCEDURE — 99232 SBSQ HOSP IP/OBS MODERATE 35: CPT | Mod: ,,, | Performed by: HOSPITALIST

## 2023-12-14 PROCEDURE — 25500020 PHARM REV CODE 255: Performed by: HOSPITALIST

## 2023-12-14 PROCEDURE — 84443 ASSAY THYROID STIM HORMONE: CPT | Performed by: HOSPITALIST

## 2023-12-14 PROCEDURE — 83735 ASSAY OF MAGNESIUM: CPT | Performed by: HOSPITALIST

## 2023-12-14 PROCEDURE — 80048 BASIC METABOLIC PNL TOTAL CA: CPT | Performed by: HOSPITALIST

## 2023-12-14 PROCEDURE — 85025 COMPLETE CBC W/AUTO DIFF WBC: CPT | Performed by: HOSPITALIST

## 2023-12-14 PROCEDURE — 11000001 HC ACUTE MED/SURG PRIVATE ROOM

## 2023-12-14 PROCEDURE — 93010 ELECTROCARDIOGRAM REPORT: CPT | Mod: ,,, | Performed by: STUDENT IN AN ORGANIZED HEALTH CARE EDUCATION/TRAINING PROGRAM

## 2023-12-14 PROCEDURE — 83020 HEMOGLOBIN ELECTROPHORESIS: ICD-10-PCS | Mod: 26,,, | Performed by: PATHOLOGY

## 2023-12-14 PROCEDURE — 83020 HEMOGLOBIN ELECTROPHORESIS: CPT | Mod: 26,,, | Performed by: PATHOLOGY

## 2023-12-14 PROCEDURE — 83615 LACTATE (LD) (LDH) ENZYME: CPT | Performed by: HOSPITALIST

## 2023-12-14 PROCEDURE — 83540 ASSAY OF IRON: CPT | Performed by: HOSPITALIST

## 2023-12-14 PROCEDURE — 25000003 PHARM REV CODE 250: Performed by: NURSE PRACTITIONER

## 2023-12-14 PROCEDURE — 82272 OCCULT BLD FECES 1-3 TESTS: CPT

## 2023-12-14 PROCEDURE — 86880 COOMBS TEST DIRECT: CPT | Performed by: HOSPITALIST

## 2023-12-14 PROCEDURE — 80061 LIPID PANEL: CPT | Performed by: HOSPITALIST

## 2023-12-14 PROCEDURE — 93005 ELECTROCARDIOGRAM TRACING: CPT

## 2023-12-14 RX ORDER — INSULIN ASPART 100 [IU]/ML
0-10 INJECTION, SOLUTION INTRAVENOUS; SUBCUTANEOUS
Status: DISCONTINUED | OUTPATIENT
Start: 2023-12-14 | End: 2023-12-15 | Stop reason: HOSPADM

## 2023-12-14 RX ORDER — GLUCAGON 1 MG
1 KIT INJECTION
Status: DISCONTINUED | OUTPATIENT
Start: 2023-12-14 | End: 2023-12-15 | Stop reason: HOSPADM

## 2023-12-14 RX ORDER — METOPROLOL TARTRATE 25 MG/1
12.5 TABLET ORAL 2 TIMES DAILY
Status: DISCONTINUED | OUTPATIENT
Start: 2023-12-14 | End: 2023-12-14

## 2023-12-14 RX ORDER — IBUPROFEN 200 MG
16 TABLET ORAL
Status: DISCONTINUED | OUTPATIENT
Start: 2023-12-14 | End: 2023-12-15 | Stop reason: HOSPADM

## 2023-12-14 RX ORDER — IBUPROFEN 200 MG
24 TABLET ORAL
Status: DISCONTINUED | OUTPATIENT
Start: 2023-12-14 | End: 2023-12-15 | Stop reason: HOSPADM

## 2023-12-14 RX ORDER — MAGNESIUM SULFATE 1 G/100ML
1 INJECTION INTRAVENOUS ONCE
Status: COMPLETED | OUTPATIENT
Start: 2023-12-14 | End: 2023-12-14

## 2023-12-14 RX ADMIN — ATORVASTATIN CALCIUM 20 MG: 20 TABLET, FILM COATED ORAL at 09:12

## 2023-12-14 RX ADMIN — LEVETIRACETAM 500 MG: 500 TABLET, FILM COATED ORAL at 09:12

## 2023-12-14 RX ADMIN — INSULIN DETEMIR 12 UNITS: 100 INJECTION, SOLUTION SUBCUTANEOUS at 09:12

## 2023-12-14 RX ADMIN — AMIODARONE HYDROCHLORIDE 0.5 MG/MIN: 1.8 INJECTION, SOLUTION INTRAVENOUS at 09:12

## 2023-12-14 RX ADMIN — METOPROLOL TARTRATE 25 MG: 25 TABLET, FILM COATED ORAL at 09:12

## 2023-12-14 RX ADMIN — AMIODARONE HYDROCHLORIDE 150 MG: 1.5 INJECTION, SOLUTION INTRAVENOUS at 02:12

## 2023-12-14 RX ADMIN — APIXABAN 5 MG: 5 TABLET, FILM COATED ORAL at 09:12

## 2023-12-14 RX ADMIN — IOPAMIDOL 100 ML: 755 INJECTION, SOLUTION INTRAVENOUS at 05:12

## 2023-12-14 RX ADMIN — MAGNESIUM SULFATE IN DEXTROSE 1 G: 10 INJECTION, SOLUTION INTRAVENOUS at 06:12

## 2023-12-14 RX ADMIN — PANTOPRAZOLE SODIUM 40 MG: 40 TABLET, DELAYED RELEASE ORAL at 09:12

## 2023-12-14 RX ADMIN — AMIODARONE HYDROCHLORIDE 1 MG/MIN: 1.8 INJECTION, SOLUTION INTRAVENOUS at 02:12

## 2023-12-14 NOTE — SUBJECTIVE & OBJECTIVE
Past Medical History:   Diagnosis Date    Atrial fibrillation with RVR 10/06/2022    Atrial flutter 10/17/2022    followed by Dr. Alcazar    Chronic anemia     baseline hgb 8 to 9    Chronic kidney disease, stage 2 (mild)     baseline creat 1.1    Dyslipidemia     Epilepsy, unspecified, not intractable, without status epilepticus     followed by Jj Goodrich NP, [facility] neurology    Essential (primary) hypertension 10/06/2022    Gastroesophageal reflux disease 04/20/2023    Hemiplegia of left nondominant side as late effect of cerebral infarction, unspecified hemiplegia type     Paroxysmal atrial fibrillation 11/07/2022    Stroke        History reviewed. No pertinent surgical history.    Review of patient's allergies indicates:  No Known Allergies    No current facility-administered medications on file prior to encounter.     Current Outpatient Medications on File Prior to Encounter   Medication Sig    amLODIPine (NORVASC) 5 MG tablet Take 1 tablet (5 mg total) by mouth once daily.    atorvastatin (LIPITOR) 20 MG tablet Take 1 tablet (20 mg total) by mouth once daily.    diphenoxylate-atropine 2.5-0.025 mg (LOMOTIL) 2.5-0.025 mg per tablet TAKE ONE (1) TO TWO (2) TABLETS BY MOUTH TWO  TIMES DAILY AS NEEDED FOR DIARRHEA.    ELIQUIS 5 mg Tab TAKE ONE (1) TABLET BY MOUTH TWICE DAILY AS DIRECTED    FEROSUL 325 mg (65 mg iron) Tab tablet TAKE ONE (1) TABLET BY MOUTH TWICE DAILY    levETIRAcetam (KEPPRA) 500 MG Tab Take 1 tablet (500 mg total) by mouth 2 (two) times daily.    meclizine (ANTIVERT) 25 MG tablet TAKE ONE (1) TABLET BY MOUTH EVERY 12 HOURS AS NEEDED    metoprolol succinate (TOPROL-XL) 25 MG 24 hr tablet Take 0.5 tablets (12.5 mg total) by mouth 2 (two) times daily.    omeprazole (PRILOSEC) 20 MG capsule Take 1 capsule (20 mg total) by mouth once daily.    ondansetron (ZOFRAN) 4 MG tablet Take 1 tablet (4 mg total) by mouth every 6 (six) hours.    pioglitazone (ACTOS) 30 MG tablet Take 1 tablet (30 mg total)  by mouth once daily.     Family History    None       Tobacco Use    Smoking status: Never     Passive exposure: Never    Smokeless tobacco: Never   Substance and Sexual Activity    Alcohol use: Never    Drug use: Never    Sexual activity: Not Currently     Review of Systems   Constitutional: Negative for chills and fever.   Cardiovascular:  Positive for chest pain, dyspnea on exertion and leg swelling. Negative for orthopnea, palpitations and syncope.   Respiratory:  Positive for shortness of breath.      Objective:     Vital Signs (Most Recent):  Temp: 99 °F (37.2 °C) (12/13/23 1223)  Pulse: (!) 125 (12/13/23 1913)  Resp: 17 (12/13/23 1913)  BP: 125/77 (12/13/23 1913)  SpO2: 100 % (12/13/23 1913) Vital Signs (24h Range):  Temp:  [97.5 °F (36.4 °C)-99 °F (37.2 °C)] 99 °F (37.2 °C)  Pulse:  [122-131] 125  Resp:  [11-19] 17  SpO2:  [95 %-100 %] 100 %  BP: (113-148)/(61-85) 125/77     Weight: 58.1 kg (128 lb)  Body mass index is 27.7 kg/m².    SpO2: 100 %       No intake or output data in the 24 hours ending 12/13/23 2101    Lines/Drains/Airways       Peripheral Intravenous Line  Duration                  Peripheral IV - Single Lumen 12/13/23 1259 20 G Anterior;Distal;Right Forearm <1 day                     Physical Exam  Vitals reviewed.   Constitutional:       General: She is not in acute distress.  HENT:      Mouth/Throat:      Mouth: Mucous membranes are moist.      Pharynx: Oropharynx is clear.   Eyes:      General: No scleral icterus.     Pupils: Pupils are equal, round, and reactive to light.   Cardiovascular:      Rate and Rhythm: Regular rhythm. Tachycardia present.      Heart sounds: Murmur heard.   Pulmonary:      Effort: Pulmonary effort is normal.      Breath sounds: Normal breath sounds. No wheezing, rhonchi or rales.   Abdominal:      General: Bowel sounds are normal.      Palpations: Abdomen is soft.   Musculoskeletal:      Right lower leg: Edema present.      Left lower leg: Edema present.   Skin:     " General: Skin is warm and dry.      Coloration: Skin is not jaundiced or pale.   Neurological:      Mental Status: She is alert and oriented to person, place, and time.          Significant Labs: ABG: No results for input(s): "PH", "PCO2", "HCO3", "POCSATURATED", "BE" in the last 48 hours., Blood Culture: No results for input(s): "LABBLOO" in the last 48 hours., BMP:   Recent Labs   Lab 12/13/23  1122 12/13/23  1255   GLU 94 97   * 132*   K 4.0 3.6   CL 96* 100   CO2 28 23   BUN 14 13   CREATININE 0.99 0.92   CALCIUM 9.2 8.6   , CMP   Recent Labs   Lab 12/13/23  1122 12/13/23  1255   * 132*   K 4.0 3.6   CL 96* 100   CO2 28 23   GLU 94 97   BUN 14 13   CREATININE 0.99 0.92   CALCIUM 9.2 8.6   PROT  --  6.7   ALBUMIN  --  3.5   BILITOT  --  0.3   ALKPHOS  --  49*   AST  --  21   ALT  --  19   ANIONGAP 11 13   , CBC   Recent Labs   Lab 12/13/23  1122 12/13/23  1255   WBC 3.89* 3.69*   HGB 8.3* 8.5*   HCT 24.8* 25.3*    314   , INR No results for input(s): "INR", "PROTIME" in the last 48 hours., Lipid Panel No results for input(s): "CHOL", "HDL", "LDLCALC", "TRIG", "CHOLHDL" in the last 48 hours., and Troponin No results for input(s): "TROPONINI" in the last 48 hours.    Significant Imaging: Echocardiogram: Transthoracic echo (TTE) complete (Cupid Only):   Results for orders placed or performed during the hospital encounter of 10/20/23   Echo   Result Value Ref Range    LVOT stroke volume 61.38 cm3    LVIDd 4.63 3.5 - 6.0 cm    LV Systolic Volume 49.05 mL    LVIDs 3.45 2.1 - 4.0 cm    LV Diastolic Volume 98.86 mL    IVS 1.01 0.6 - 1.1 cm    LVOT diameter 1.86 cm    LVOT area 2.7 cm2    FS 25 (A) 28 - 44 %    Left Ventricle Relative Wall Thickness 0.40 cm    Posterior Wall 0.93 0.6 - 1.1 cm    LV mass 153.97 g    MV Peak E Bk 0.87 m/s    TDI LATERAL 0.10 m/s    TDI SEPTAL 0.07 m/s    E/E' ratio 10.24 m/s    MV Peak A Bk 0.59 m/s    TR Max Bk 2.63 m/s    E/A ratio 1.47     E wave deceleration time " 202.63 msec    LV SEPTAL E/E' RATIO 12.43 m/s    LV LATERAL E/E' RATIO 8.70 m/s    LVOT peak ginette 0.96 m/s    Left Ventricular Outflow Tract Mean Velocity 0.72 cm/s    Left Ventricular Outflow Tract Mean Gradient 2.30 mmHg    Right ventricular length in diastole (apical 4-chamber view) 5.99 cm    RV mid diameter 2.74 cm    TAPSE 2.13 cm    RA area 17.0 cm2    AV mean gradient 4 mmHg    AV peak gradient 8 mmHg    Ao peak ginette 1.37 m/s    Ao VTI 32.90 cm    LVOT peak VTI 22.60 cm    AV valve area 1.87 cm²    AV Velocity Ratio 0.70     AV index (prosthetic) 0.69     KOJO by Velocity Ratio 1.90 cm²    Triscuspid Valve Regurgitation Peak Gradient 28 mmHg    PV PEAK VELOCITY 0.93 m/s    PV peak gradient 3 mmHg    Ao root annulus 2.42 cm    Mean e' 0.09 m/s    EF 65 %    LA size 4.5 cm    RVDD 2.80 cm    TV resting pulmonary artery pressure 31 mmHg    RV TB RVSP 6 mmHg    Est. RA pres 3 mmHg    Narrative      Left Ventricle: The left ventricle is normal in size. Normal wall   thickness. There is normal systolic function. Ejection fraction by visual   approximation is 65%.    Left Atrium: Left atrium is mildly dilated.23.82cm2    Right Ventricle: Normal right ventricular cavity size. Systolic   function is normal.    Aortic Valve: The aortic valve is a trileaflet valve. There is mild   aortic valve sclerosis.    Mitral Valve: There is mild regurgitation.    Tricuspid Valve: There is mild regurgitation.    IVC/SVC: Normal venous pressure at 3 mmHg.     , EKG:   Results for orders placed or performed in visit on 10/17/23   EKG 12-lead    Collection Time: 10/17/23  9:48 AM    Narrative    Test Reason : I48.92,    Vent. Rate : 074 BPM     Atrial Rate : 074 BPM     P-R Int : 204 ms          QRS Dur : 080 ms      QT Int : 394 ms       P-R-T Axes : 076 060 055 degrees     QTc Int : 437 ms    Normal sinus rhythm  Normal ECG  When compared with ECG of 11-OCT-2022 09:10,  No significant change was found  Confirmed by Ottoniel KATZ,  Carlos ([id number]) on 10/17/2023 1:26:33 PM    Referred By: ARY SANTOS           Confirmed By:Carlos Alcazar MD    , and X-Ray: CXR: X-Ray Chest 1 View (CXR): X-Ray Chest PA And Lateral  Order: [id number]  Status: Final result       Visible to patient: No (inaccessible in Patient Portal)       Next appt: 12/27/2023 at 01:00 PM in Family Medicine (Ary Santos MD)       Dx: Congestive heart failure, unspecified...    0 Result Notes  Details    Reading Physician Reading Date Result Priority   Mariano Garner II, MD  [phone] 12/13/2023 Routine     Narrative & Impression  EXAMINATION:  XR CHEST PA AND LATERAL     CLINICAL HISTORY:  Heart failure, unspecified     COMPARISON:  6 October 2022     TECHNIQUE:  XR CHEST PA AND LATERAL     FINDINGS:  The heart and mediastinum are stable in size and configuration.  The pulmonary vascularity is normal in caliber.  No lung infiltrates, effusions, pneumothorax or other abnormality is demonstrated.     Impression:     No evidence of cardiopulmonary disease.        Electronically signed by: Mariano Garner  Date:                                            12/13/2023  Time:                                           11:10

## 2023-12-14 NOTE — PROGRESS NOTES
[facility] - Emergency Department  Hospital Medicine  Progress Note    Patient Name: Zbigniew Ventura  MRN: [id number]  Patient Class: IP- Inpatient   Admission Date: 12/13/2023  Length of Stay: 0 days  Attending Physician: Alisson Solis DO  Primary Care Provider: Farhad Culver MD        Subjective:     Principal Problem:Atrial flutter        HPI:  82 yo F (looks younger than stated age) presents to the ED with chest pain and palpitations.  Patient speaks primarily Cambodian language but  does speak some English.  Works well with the translation audio available at bedside and nurse is able to communicate well with patient using this device.  Patient is not having any dyspnea but she does have a chronic anemia that is at her baseline between 8-9 g/dL (had anemia panel drawn a year ago with normal ferritin and felt to be chronic).  She also has an elevated proBNP at 3K but review of records show this is also at baseline.      Upon arrival patient was found to have tachycardia but no EKG changes consistent with acute ischemia (trops 16 to 16).   Concern for afib RVR because has past history.  Cardiology consulted and felt to be atrial flutter.  CXR and abd film unremarkable.  Echo 10/20/23 for aortic murmur was also unremarkable.  Patient did admit to stopping her BB due to hypotension.  Patient is also on norvasc.      She has no complaints at this time but her HR is still at 128 bpm.  She has been restarted on her BB and cardiology consult has been reviewed.  Patient is diabetic but BS well controlled and will check current A1c.      Overview/Hospital Course:  83 year old female with a PMH of atrial flutter, seizures, CVA, and chronic renal disease presents to the hospital with chest pain.  She does not speak English, and the  machine in her ER room was not functioning.  Her  is at the bedside and speaks very minimal English.  He stated she had chest pain but now feels better  and wants to go home.          Vitals:    12/14/23 0800 12/14/23 0900 12/14/23 1000 12/14/23 1207   BP: (!) 108/58 (!) 100/58 (!) 100/52    Pulse: 93 (!) 126 (!) 125    Resp: 14  16    Temp:    99.5 °F (37.5 °C)   TempSrc:       SpO2: 99% (!) 88% 100%    Weight:       Height:             PHYSICAL EXAM:    GEN: NAD; alert and oriented x 3  CVS: irregular rate and rhythm; no murmurs; tachycardic  RESP: clear to auscultation bilaterally; no rhonchi, rales, or wheezes noted  GI: soft, non-tender, non-distended; + bowel sounds  EXTR: no clubbing, cyanosis, or edema      Assessment/Plan:      * Atrial flutter  Appreciate Cardiology consult; CHARLES/cardioversion possibly pending; TSH lower end of normal; on Metoprolol and Eliquis    Paroxysmal atrial fibrillation  On Metoprolol and Eliquis; seen by Cardiology; tachycardic in ER    Chronic kidney disease, stage 2 (mild)  Cr fine; will  monitor    Chronic anemia  Hgb low but stable; occult blood negative; will monitor while on Eliquis    Stroke  On Eliquis and statin; stable      Epilepsy, unspecified, not intractable, without status epilepticus  On Keppra.                  Alisson Solis,   Department of Hospital Medicine   [facility] - Emergency Department

## 2023-12-14 NOTE — ASSESSMENT & PLAN NOTE
- Patient seen and evaluated by Dr. Reyes  - Bridgetutter, rate 120s  - TSH pending  - Start metoprolol 25 mg BID  - Monitor overnight on tele for any evidence of tachy-rudi syndrome and continue monitoring BP  - May consider CHARLES cardioversion tomorrow if needed  - Continue Eliquis  - Consider outpatient ischemic evaluation for reported chest pain (troponin negative x 2, no ischemic changes on EKG)  - Further recommendations to follow

## 2023-12-14 NOTE — CONSULTS
[facility] - Emergency Department  Cardiology  Consult Note    Patient Name: Zbigniew Ventura  MRN: [id number]  Admission Date: 12/13/2023  Hospital Length of Stay: 0 days  Code Status: Full Code   Attending Provider: Alisson Solis DO   Consulting Provider: GERRY Kerr  Primary Care Physician: Farhad Culver MD  Principal Problem:<principal problem not specified>    Patient information was obtained from patient, spouse/SO, past medical records, and ER records.     Inpatient consult to Cardiology  Consult performed by: Sejal Farah FNP  Consult ordered by: Sandy Sandoval FNP  Reason for consult: medication adjustments        Subjective:     Chief Complaint:  chest pain and dizziness     HPI:   82 y/o female with PMH of atrial fib/flutter, CVA, DM, HTN, HLD, and epilepsy who presents to ED chest pain and dizziness. Spouse reports patient has not been taking medication for heart due to low blood pressure. Cardiology consulted for medication adjustments. She is followed outpatient by Dr. Alcazar, last seen 10/19/2023.    Patient denies current chest pain or sob.  at bedside assisting with translation. He reports intermittent c/o sob and chest pain along with increased lower extremity edema for the past couple of months. Pt quit taking her metoprolol due to low BP. Currently in aflutter, rate 120s. EKG aflutter, rate 128 without ischemic changes. Echo 10/2023 reviewed and normal with mild AS. Troponin negative x 2. BNP 3100 (was 4000 in 2022). She is anemic H/H 8.5/25.3 which is close to baseline 9/28. CMP unremarkable. TSH pending. No previous ischemic evaluation.    Past Medical History:   Diagnosis Date    Atrial fibrillation with RVR 10/06/2022    Atrial flutter 10/17/2022    followed by Dr. Alcazar    Chronic anemia     baseline hgb 8 to 9    Chronic kidney disease, stage 2 (mild)     baseline creat 1.1    Dyslipidemia     Epilepsy, unspecified, not intractable, without  status epilepticus     followed by Jj Goodrich NP, [facility] neurology    Essential (primary) hypertension 10/06/2022    Gastroesophageal reflux disease 04/20/2023    Hemiplegia of left nondominant side as late effect of cerebral infarction, unspecified hemiplegia type     Paroxysmal atrial fibrillation 11/07/2022    Stroke        History reviewed. No pertinent surgical history.    Review of patient's allergies indicates:  No Known Allergies    No current facility-administered medications on file prior to encounter.     Current Outpatient Medications on File Prior to Encounter   Medication Sig    amLODIPine (NORVASC) 5 MG tablet Take 1 tablet (5 mg total) by mouth once daily.    atorvastatin (LIPITOR) 20 MG tablet Take 1 tablet (20 mg total) by mouth once daily.    diphenoxylate-atropine 2.5-0.025 mg (LOMOTIL) 2.5-0.025 mg per tablet TAKE ONE (1) TO TWO (2) TABLETS BY MOUTH TWO  TIMES DAILY AS NEEDED FOR DIARRHEA.    ELIQUIS 5 mg Tab TAKE ONE (1) TABLET BY MOUTH TWICE DAILY AS DIRECTED    FEROSUL 325 mg (65 mg iron) Tab tablet TAKE ONE (1) TABLET BY MOUTH TWICE DAILY    levETIRAcetam (KEPPRA) 500 MG Tab Take 1 tablet (500 mg total) by mouth 2 (two) times daily.    meclizine (ANTIVERT) 25 MG tablet TAKE ONE (1) TABLET BY MOUTH EVERY 12 HOURS AS NEEDED    metoprolol succinate (TOPROL-XL) 25 MG 24 hr tablet Take 0.5 tablets (12.5 mg total) by mouth 2 (two) times daily.    omeprazole (PRILOSEC) 20 MG capsule Take 1 capsule (20 mg total) by mouth once daily.    ondansetron (ZOFRAN) 4 MG tablet Take 1 tablet (4 mg total) by mouth every 6 (six) hours.    pioglitazone (ACTOS) 30 MG tablet Take 1 tablet (30 mg total) by mouth once daily.     Family History    None       Tobacco Use    Smoking status: Never     Passive exposure: Never    Smokeless tobacco: Never   Substance and Sexual Activity    Alcohol use: Never    Drug use: Never    Sexual activity: Not Currently     Review of Systems   Constitutional: Negative for chills  "and fever.   Cardiovascular:  Positive for chest pain, dyspnea on exertion and leg swelling. Negative for orthopnea, palpitations and syncope.   Respiratory:  Positive for shortness of breath.      Objective:     Vital Signs (Most Recent):  Temp: 99 °F (37.2 °C) (12/13/23 1223)  Pulse: (!) 125 (12/13/23 1913)  Resp: 17 (12/13/23 1913)  BP: 125/77 (12/13/23 1913)  SpO2: 100 % (12/13/23 1913) Vital Signs (24h Range):  Temp:  [97.5 °F (36.4 °C)-99 °F (37.2 °C)] 99 °F (37.2 °C)  Pulse:  [122-131] 125  Resp:  [11-19] 17  SpO2:  [95 %-100 %] 100 %  BP: (113-148)/(61-85) 125/77     Weight: 58.1 kg (128 lb)  Body mass index is 27.7 kg/m².    SpO2: 100 %       No intake or output data in the 24 hours ending 12/13/23 2101    Lines/Drains/Airways       Peripheral Intravenous Line  Duration                  Peripheral IV - Single Lumen 12/13/23 1259 20 G Anterior;Distal;Right Forearm <1 day                     Physical Exam  Vitals reviewed.   Constitutional:       General: She is not in acute distress.  HENT:      Mouth/Throat:      Mouth: Mucous membranes are moist.      Pharynx: Oropharynx is clear.   Eyes:      General: No scleral icterus.     Pupils: Pupils are equal, round, and reactive to light.   Cardiovascular:      Rate and Rhythm: Regular rhythm. Tachycardia present.      Heart sounds: Murmur heard.   Pulmonary:      Effort: Pulmonary effort is normal.      Breath sounds: Normal breath sounds. No wheezing, rhonchi or rales.   Abdominal:      General: Bowel sounds are normal.      Palpations: Abdomen is soft.   Musculoskeletal:      Right lower leg: Edema present.      Left lower leg: Edema present.   Skin:     General: Skin is warm and dry.      Coloration: Skin is not jaundiced or pale.   Neurological:      Mental Status: She is alert and oriented to person, place, and time.          Significant Labs: ABG: No results for input(s): "PH", "PCO2", "HCO3", "POCSATURATED", "BE" in the last 48 hours., Blood Culture: No " "results for input(s): "LABBLOO" in the last 48 hours., BMP:   Recent Labs   Lab 12/13/23  1122 12/13/23  1255   GLU 94 97   * 132*   K 4.0 3.6   CL 96* 100   CO2 28 23   BUN 14 13   CREATININE 0.99 0.92   CALCIUM 9.2 8.6   , CMP   Recent Labs   Lab 12/13/23  1122 12/13/23  1255   * 132*   K 4.0 3.6   CL 96* 100   CO2 28 23   GLU 94 97   BUN 14 13   CREATININE 0.99 0.92   CALCIUM 9.2 8.6   PROT  --  6.7   ALBUMIN  --  3.5   BILITOT  --  0.3   ALKPHOS  --  49*   AST  --  21   ALT  --  19   ANIONGAP 11 13   , CBC   Recent Labs   Lab 12/13/23  1122 12/13/23  1255   WBC 3.89* 3.69*   HGB 8.3* 8.5*   HCT 24.8* 25.3*    314   , INR No results for input(s): "INR", "PROTIME" in the last 48 hours., Lipid Panel No results for input(s): "CHOL", "HDL", "LDLCALC", "TRIG", "CHOLHDL" in the last 48 hours., and Troponin No results for input(s): "TROPONINI" in the last 48 hours.    Significant Imaging: Echocardiogram: Transthoracic echo (TTE) complete (Cupid Only):   Results for orders placed or performed during the hospital encounter of 10/20/23   Echo   Result Value Ref Range    LVOT stroke volume 61.38 cm3    LVIDd 4.63 3.5 - 6.0 cm    LV Systolic Volume 49.05 mL    LVIDs 3.45 2.1 - 4.0 cm    LV Diastolic Volume 98.86 mL    IVS 1.01 0.6 - 1.1 cm    LVOT diameter 1.86 cm    LVOT area 2.7 cm2    FS 25 (A) 28 - 44 %    Left Ventricle Relative Wall Thickness 0.40 cm    Posterior Wall 0.93 0.6 - 1.1 cm    LV mass 153.97 g    MV Peak E Bk 0.87 m/s    TDI LATERAL 0.10 m/s    TDI SEPTAL 0.07 m/s    E/E' ratio 10.24 m/s    MV Peak A Bk 0.59 m/s    TR Max Bk 2.63 m/s    E/A ratio 1.47     E wave deceleration time 202.63 msec    LV SEPTAL E/E' RATIO 12.43 m/s    LV LATERAL E/E' RATIO 8.70 m/s    LVOT peak bk 0.96 m/s    Left Ventricular Outflow Tract Mean Velocity 0.72 cm/s    Left Ventricular Outflow Tract Mean Gradient 2.30 mmHg    Right ventricular length in diastole (apical 4-chamber view) 5.99 cm    RV mid " diameter 2.74 cm    TAPSE 2.13 cm    RA area 17.0 cm2    AV mean gradient 4 mmHg    AV peak gradient 8 mmHg    Ao peak ginette 1.37 m/s    Ao VTI 32.90 cm    LVOT peak VTI 22.60 cm    AV valve area 1.87 cm²    AV Velocity Ratio 0.70     AV index (prosthetic) 0.69     KOJO by Velocity Ratio 1.90 cm²    Triscuspid Valve Regurgitation Peak Gradient 28 mmHg    PV PEAK VELOCITY 0.93 m/s    PV peak gradient 3 mmHg    Ao root annulus 2.42 cm    Mean e' 0.09 m/s    EF 65 %    LA size 4.5 cm    RVDD 2.80 cm    TV resting pulmonary artery pressure 31 mmHg    RV TB RVSP 6 mmHg    Est. RA pres 3 mmHg    Narrative      Left Ventricle: The left ventricle is normal in size. Normal wall   thickness. There is normal systolic function. Ejection fraction by visual   approximation is 65%.    Left Atrium: Left atrium is mildly dilated.23.82cm2    Right Ventricle: Normal right ventricular cavity size. Systolic   function is normal.    Aortic Valve: The aortic valve is a trileaflet valve. There is mild   aortic valve sclerosis.    Mitral Valve: There is mild regurgitation.    Tricuspid Valve: There is mild regurgitation.    IVC/SVC: Normal venous pressure at 3 mmHg.     , EKG:   Results for orders placed or performed in visit on 10/17/23   EKG 12-lead    Collection Time: 10/17/23  9:48 AM    Narrative    Test Reason : I48.92,    Vent. Rate : 074 BPM     Atrial Rate : 074 BPM     P-R Int : 204 ms          QRS Dur : 080 ms      QT Int : 394 ms       P-R-T Axes : 076 060 055 degrees     QTc Int : 437 ms    Normal sinus rhythm  Normal ECG  When compared with ECG of 11-OCT-2022 09:10,  No significant change was found  Confirmed by Carlos Alcazar MD ([id number]) on 10/17/2023 1:26:33 PM    Referred By: ARY SANTOS           Confirmed By:Carlos Alcazar MD    , and X-Ray: CXR: X-Ray Chest 1 View (CXR): X-Ray Chest PA And Lateral  Order: [id number]  Status: Final result       Visible to patient: No (inaccessible in Patient Portal)       Next appt:  12/27/2023 at 01:00 PM in Family Medicine (Farhad Culver MD)       Dx: Congestive heart failure, unspecified...    0 Result Notes  Details    Reading Physician Reading Date Result Priority   Mariano Garner II, MD  [phone] 12/13/2023 Routine     Narrative & Impression  EXAMINATION:  XR CHEST PA AND LATERAL     CLINICAL HISTORY:  Heart failure, unspecified     COMPARISON:  6 October 2022     TECHNIQUE:  XR CHEST PA AND LATERAL     FINDINGS:  The heart and mediastinum are stable in size and configuration.  The pulmonary vascularity is normal in caliber.  No lung infiltrates, effusions, pneumothorax or other abnormality is demonstrated.     Impression:     No evidence of cardiopulmonary disease.        Electronically signed by: Mariano Garner  Date:                                            12/13/2023  Time:                                           11:10     Assessment and Plan:     Atrial flutter  - Patient seen and evaluated by Dr. Eric Braswell, rate 120s  - TSH pending  - Start metoprolol 25 mg BID  - Monitor overnight on tele for any evidence of tachy-rudi syndrome and continue monitoring BP  - May consider CHARLES cardioversion tomorrow if needed  - Continue Eliquis  - Consider outpatient ischemic evaluation for reported chest pain (troponin negative x 2, no ischemic changes on EKG)  - Further recommendations to follow        VTE Risk Mitigation (From admission, onward)           Ordered     apixaban tablet 5 mg  2 times daily         12/13/23 3508                    Thank you for your consult. I will follow-up with patient. Please contact us if you have any additional questions.    Sejal Farah, TRUDIP  Cardiology   [facility] - Emergency Department

## 2023-12-14 NOTE — HPI
84 y/o female with PMH of atrial fib/flutter, CVA, DM, HTN, HLD, and epilepsy who presents to ED chest pain and dizziness. Spouse reports patient has not been taking medication for heart due to low blood pressure. Cardiology consulted for medication adjustments. She is followed outpatient by Dr. Alcazar, last seen 10/19/2023.    Patient denies current chest pain or sob.  at bedside assisting with translation. He reports intermittent c/o sob and chest pain along with increased lower extremity edema for the past couple of months. Pt quit taking her metoprolol due to low BP. Currently in aflutter, rate 120s. EKG aflutter, rate 128 without ischemic changes. Echo 10/2023 reviewed and normal with mild AS. Troponin negative x 2. BNP 3100 (was 4000 in 2022). She is anemic H/H 8.5/25.3 which is close to baseline 9/28. CMP unremarkable. TSH pending. No previous ischemic evaluation.

## 2023-12-14 NOTE — ASSESSMENT & PLAN NOTE
Creatine stable for now. BMP reviewed- noted Estimated Creatinine Clearance: 38.2 mL/min (based on SCr of 0.92 mg/dL). according to latest data. Based on current GFR, CKD stage is stage 2 - GFR 60-89.  Monitor UOP and serial BMP and adjust therapy as needed. Renally dose meds. Avoid nephrotoxic medications and procedures.

## 2023-12-14 NOTE — ASSESSMENT & PLAN NOTE
Will continue BB and hold CCB.  Hopefully patient will be able to tolerate increased dose if needed.    Will check a TSH.  Monitor closely on telemetry.    NPO after midnight.  Cardiology to evaluate for cardioversion or transfer for radiofrequency catheter ablation if indicated.   Patient is already on full anticoagulation for PAF and will continue.

## 2023-12-14 NOTE — PLAN OF CARE
[facility] - Emergency Department  Initial Discharge Assessment       Primary Care Provider: Farhad Culver MD    Admission Diagnosis: Atrial flutter [I48.92]  Typical atrial flutter [I48.3]    Admission Date: 12/13/2023  Expected Discharge Date:     Transition of Care Barriers: None    Payor: MEDICARE / Plan: MEDICARE PART A & B / Product Type: Government /     Extended Emergency Contact Information  Primary Emergency Contact: MARY GRACE LOZANO  Home Phone: [phone]  Relation: Daughter  Preferred language: Cambodian   needed? No    Discharge Plan A: Home, Home with family  Discharge Plan B: Home, Home with family      [facility] [place] - [address]  [address]  [place] [address]  Phone: [phone] Fax: [phone]    [id number] - [place], [place] - [address] AT [facility]  [address]  [facility] [address]  Phone: [phone] Fax: [phone]      Initial Assessment (most recent)       Adult Discharge Assessment - 12/14/23 1530          Discharge Assessment    Assessment Type Discharge Planning Assessment     Source of Information family     People in Home spouse     Do you expect to return to your current living situation? Yes     Do you have help at home or someone to help you manage your care at home? Yes     Who are your caregiver(s) and their phone number(s)? Savoeuoum Nhiev- Spouse Mary Grace Nhiev- Daughter [phone]     Prior to hospitilization cognitive status: Unable to Assess     Current cognitive status: Alert/Oriented     Walking or Climbing Stairs Difficulty yes     Walking or Climbing Stairs ambulation difficulty, requires equipment     Mobility Management Cane and rw     Dressing/Bathing Difficulty no     Home Accessibility stairs to enter home     Number of Stairs, Main Entrance none     Equipment Currently Used at Home cane, straight;walker, rolling     Readmission within 30 days? No     Patient  currently being followed by outpatient case management? No     Do you currently have service(s) that help you manage your care at home? No     Do you take prescription medications? Yes     Do you have prescription coverage? Yes     Coverage Medicare     Do you have any problems affording any of your prescribed medications? No     Is the patient taking medications as prescribed? yes     Who is going to help you get home at discharge? Spouse or Daughter     How do you get to doctors appointments? family or friend will provide     Are you on dialysis? No     Do you take coumadin? No     Discharge Plan A Home;Home with family     Discharge Plan B Home;Home with family     DME Needed Upon Discharge  none     Discharge Plan discussed with: Spouse/sig other     Name(s) and Number(s) Damon Elliottiev- Spouse     Transition of Care Barriers None                   Pt cannot speak English. Pts spouse, Derikfabian Gibson at bedside and is able to speak English and answer questions. Pt lives at home with spouse, not current with hh and uses a cane and rw. The plan is for pt to return home at dc. I.M. not obtained as pt was obs. SDOH questions completed 1 month ago. SW will cont to follow for dc needs.

## 2023-12-14 NOTE — ASSESSMENT & PLAN NOTE
Patient's anemia is currently controlled. Has not received any PRBCs to date. Etiology likely d/t  unknown  Current CBC reviewed-   Lab Results   Component Value Date    HGB 8.5 (L) 12/13/2023    HCT 25.3 (L) 12/13/2023     Monitor serial CBC and transfuse if patient becomes hemodynamically unstable, symptomatic or H/H drops below 7/21.    Will check full anemia panel again to include LDH, haptoglobin and HgbEP.  TB normal so most likely not hemolyzing but will check DARREN for completeness and due to the fact patient is on eliquis will check hemoccult.  Patient is on PPI but no endoscopic evaluation noted.  Will need to follow up with GI as labs indicate.      I have added a T&S with AM labs and with tachycardia and hypotension would consider transfusion if further decline.

## 2023-12-14 NOTE — SUBJECTIVE & OBJECTIVE
Past Medical History:   Diagnosis Date    Atrial fibrillation with RVR 10/06/2022    Atrial flutter 10/17/2022    followed by Dr. Alcazar    Chronic anemia     baseline hgb 8 to 9    Chronic kidney disease, stage 2 (mild)     baseline creat 1.1    Dyslipidemia     Epilepsy, unspecified, not intractable, without status epilepticus     followed by Jj Goodrich NP, [facility] neurology    Essential (primary) hypertension 10/06/2022    Gastroesophageal reflux disease 04/20/2023    Hemiplegia of left nondominant side as late effect of cerebral infarction, unspecified hemiplegia type     Paroxysmal atrial fibrillation 11/07/2022    Stroke        History reviewed. No pertinent surgical history.    Review of patient's allergies indicates:  No Known Allergies    No current facility-administered medications on file prior to encounter.     Current Outpatient Medications on File Prior to Encounter   Medication Sig    amLODIPine (NORVASC) 5 MG tablet Take 1 tablet (5 mg total) by mouth once daily.    atorvastatin (LIPITOR) 20 MG tablet Take 1 tablet (20 mg total) by mouth once daily.    diphenoxylate-atropine 2.5-0.025 mg (LOMOTIL) 2.5-0.025 mg per tablet TAKE ONE (1) TO TWO (2) TABLETS BY MOUTH TWO  TIMES DAILY AS NEEDED FOR DIARRHEA.    ELIQUIS 5 mg Tab TAKE ONE (1) TABLET BY MOUTH TWICE DAILY AS DIRECTED    FEROSUL 325 mg (65 mg iron) Tab tablet TAKE ONE (1) TABLET BY MOUTH TWICE DAILY    levETIRAcetam (KEPPRA) 500 MG Tab Take 1 tablet (500 mg total) by mouth 2 (two) times daily.    meclizine (ANTIVERT) 25 MG tablet TAKE ONE (1) TABLET BY MOUTH EVERY 12 HOURS AS NEEDED    metoprolol succinate (TOPROL-XL) 25 MG 24 hr tablet Take 0.5 tablets (12.5 mg total) by mouth 2 (two) times daily.    omeprazole (PRILOSEC) 20 MG capsule Take 1 capsule (20 mg total) by mouth once daily.    ondansetron (ZOFRAN) 4 MG tablet Take 1 tablet (4 mg total) by mouth every 6 (six) hours.    pioglitazone (ACTOS) 30 MG tablet Take 1 tablet (30 mg total)  by mouth once daily.     Family History    None       Tobacco Use    Smoking status: Never     Passive exposure: Never    Smokeless tobacco: Never   Substance and Sexual Activity    Alcohol use: Never    Drug use: Never    Sexual activity: Not Currently     Review of Systems   Constitutional:  Negative for appetite change, chills, fatigue, fever and unexpected weight change.   HENT:  Negative for congestion, mouth sores, nosebleeds, sinus pain, sore throat and trouble swallowing.    Respiratory:  Positive for chest tightness. Negative for apnea, cough and shortness of breath.    Cardiovascular:  Positive for palpitations. Negative for chest pain and leg swelling.   Gastrointestinal:  Positive for abdominal pain. Negative for blood in stool, constipation, diarrhea, nausea and vomiting.   Genitourinary:  Negative for decreased urine volume, difficulty urinating, dysuria and frequency.   Musculoskeletal:  Negative for arthralgias, back pain and neck pain.   Skin:  Negative for rash.   Neurological:  Negative for syncope, light-headedness and headaches.   Hematological:  Does not bruise/bleed easily.   Psychiatric/Behavioral:  Negative for confusion and suicidal ideas.      Objective:     Vital Signs (Most Recent):  Temp: 99 °F (37.2 °C) (12/13/23 1223)  Pulse: (!) 124 (12/13/23 2315)  Resp: 14 (12/13/23 2315)  BP: (!) 93/53 (12/13/23 2315)  SpO2: 95 % (12/13/23 2315) Vital Signs (24h Range):  Temp:  [97.5 °F (36.4 °C)-99 °F (37.2 °C)] 99 °F (37.2 °C)  Pulse:  [103-131] 124  Resp:  [11-19] 14  SpO2:  [90 %-100 %] 95 %  BP: ()/(53-85) 93/53     Weight: 58.1 kg (128 lb)  Body mass index is 27.7 kg/m².     Physical Exam  Vitals and nursing note reviewed. Exam conducted with a chaperone present.   Constitutional:       Appearance: Normal appearance.   HENT:      Head: Atraumatic.      Mouth/Throat:      Mouth: Mucous membranes are moist.      Pharynx: Oropharynx is clear.   Eyes:      Conjunctiva/sclera: Conjunctivae  normal.      Pupils: Pupils are equal, round, and reactive to light.   Cardiovascular:      Rate and Rhythm: Normal rate and regular rhythm.      Pulses: Normal pulses.      Heart sounds: Murmur heard.   Pulmonary:      Effort: Pulmonary effort is normal.      Breath sounds: Normal breath sounds.   Abdominal:      General: Abdomen is flat. Bowel sounds are normal.      Palpations: Abdomen is soft.   Musculoskeletal:         General: No deformity or signs of injury. Normal range of motion.      Right lower leg: No edema.      Left lower leg: No edema.   Skin:     General: Skin is warm and dry.      Capillary Refill: Capillary refill takes less than 2 seconds.      Coloration: Skin is not jaundiced or pale.      Findings: No bruising, lesion or rash.   Neurological:      General: No focal deficit present.      Mental Status: She is alert and oriented to person, place, and time.   Psychiatric:         Mood and Affect: Mood normal.            CRANIAL NERVES     CN III, IV, VI   Pupils are equal, round, and reactive to light.       Significant Labs: All pertinent labs within the past 24 hours have been reviewed.    Significant Imaging: I have reviewed all pertinent imaging results/findings within the past 24 hours.

## 2023-12-14 NOTE — PROGRESS NOTES
Patient in room resting, spouse not in room at present time.  monitor brought into room to communicate with patient.

## 2023-12-14 NOTE — H&P
[facility] - Emergency Department  [facility] Medicine  History & Physical    Patient Name: Zbigniew Ventura  MRN: [id number]  Patient Class: OP- Observation  Admission Date: 12/13/2023  Attending Physician: Alisson Solis DO   Primary Care Provider: Farhad Culver MD         Patient information was obtained from patient, past medical records, and ER records.     Subjective:     Principal Problem:Atrial flutter    Chief Complaint:   Chief Complaint   Patient presents with    Chest Pain    Dizziness        HPI: 84 yo F (looks younger than stated age) presents to the ED with chest pain and palpitations.  Patient speaks primarily Cambodian language but  does speak some English.  Works well with the translation audio available at bedside and nurse is able to communicate well with patient using this device.  Patient is not having any dyspnea but she does have a chronic anemia that is at her baseline between 8-9 g/dL (had anemia panel drawn a year ago with normal ferritin and felt to be chronic).  She also has an elevated proBNP at [facility] but review of records show this is also at baseline.      Upon arrival patient was found to have tachycardia but no EKG changes consistent with acute ischemia (trops 16 to 16).   Concern for afib RVR because has past history.  Cardiology consulted and felt to be atrial flutter.  CXR and abd film unremarkable.  Echo 10/20/23 for aortic murmur was also unremarkable.  Patient did admit to stopping her BB due to hypotension.  Patient is also on norvasc.      She has no complaints at this time but her HR is still at 128 bpm.  She has been restarted on her BB and cardiology consult has been reviewed.  Patient is diabetic but BS well controlled and will check current A1c.      Past Medical History:   Diagnosis Date    Atrial fibrillation with RVR 10/06/2022    Atrial flutter 10/17/2022    followed by Dr. Alcazar    Chronic anemia     baseline hgb 8 to 9    Chronic kidney  disease, stage 2 (mild)     baseline creat 1.1    Dyslipidemia     Epilepsy, unspecified, not intractable, without status epilepticus     followed by Jj Goodrich NP, [facility] neurology    Essential (primary) hypertension 10/06/2022    Gastroesophageal reflux disease 04/20/2023    Hemiplegia of left nondominant side as late effect of cerebral infarction, unspecified hemiplegia type     Paroxysmal atrial fibrillation 11/07/2022    Stroke        History reviewed. No pertinent surgical history.    Review of patient's allergies indicates:  No Known Allergies    No current facility-administered medications on file prior to encounter.     Current Outpatient Medications on File Prior to Encounter   Medication Sig    amLODIPine (NORVASC) 5 MG tablet Take 1 tablet (5 mg total) by mouth once daily.    atorvastatin (LIPITOR) 20 MG tablet Take 1 tablet (20 mg total) by mouth once daily.    diphenoxylate-atropine 2.5-0.025 mg (LOMOTIL) 2.5-0.025 mg per tablet TAKE ONE (1) TO TWO (2) TABLETS BY MOUTH TWO  TIMES DAILY AS NEEDED FOR DIARRHEA.    ELIQUIS 5 mg Tab TAKE ONE (1) TABLET BY MOUTH TWICE DAILY AS DIRECTED    FEROSUL 325 mg (65 mg iron) Tab tablet TAKE ONE (1) TABLET BY MOUTH TWICE DAILY    levETIRAcetam (KEPPRA) 500 MG Tab Take 1 tablet (500 mg total) by mouth 2 (two) times daily.    meclizine (ANTIVERT) 25 MG tablet TAKE ONE (1) TABLET BY MOUTH EVERY 12 HOURS AS NEEDED    metoprolol succinate (TOPROL-XL) 25 MG 24 hr tablet Take 0.5 tablets (12.5 mg total) by mouth 2 (two) times daily.    omeprazole (PRILOSEC) 20 MG capsule Take 1 capsule (20 mg total) by mouth once daily.    ondansetron (ZOFRAN) 4 MG tablet Take 1 tablet (4 mg total) by mouth every 6 (six) hours.    pioglitazone (ACTOS) 30 MG tablet Take 1 tablet (30 mg total) by mouth once daily.     Family History    None       Tobacco Use    Smoking status: Never     Passive exposure: Never    Smokeless tobacco: Never   Substance and Sexual Activity    Alcohol use: Never     Drug use: Never    Sexual activity: Not Currently     Review of Systems   Constitutional:  Negative for appetite change, chills, fatigue, fever and unexpected weight change.   HENT:  Negative for congestion, mouth sores, nosebleeds, sinus pain, sore throat and trouble swallowing.    Respiratory:  Positive for chest tightness. Negative for apnea, cough and shortness of breath.    Cardiovascular:  Positive for palpitations. Negative for chest pain and leg swelling.   Gastrointestinal:  Positive for abdominal pain. Negative for blood in stool, constipation, diarrhea, nausea and vomiting.   Genitourinary:  Negative for decreased urine volume, difficulty urinating, dysuria and frequency.   Musculoskeletal:  Negative for arthralgias, back pain and neck pain.   Skin:  Negative for rash.   Neurological:  Negative for syncope, light-headedness and headaches.   Hematological:  Does not bruise/bleed easily.   Psychiatric/Behavioral:  Negative for confusion and suicidal ideas.      Objective:     Vital Signs (Most Recent):  Temp: 99 °F (37.2 °C) (12/13/23 1223)  Pulse: (!) 124 (12/13/23 2315)  Resp: 14 (12/13/23 2315)  BP: (!) 93/53 (12/13/23 2315)  SpO2: 95 % (12/13/23 2315) Vital Signs (24h Range):  Temp:  [97.5 °F (36.4 °C)-99 °F (37.2 °C)] 99 °F (37.2 °C)  Pulse:  [103-131] 124  Resp:  [11-19] 14  SpO2:  [90 %-100 %] 95 %  BP: ()/(53-85) 93/53     Weight: 58.1 kg (128 lb)  Body mass index is 27.7 kg/m².     Physical Exam  Vitals and nursing note reviewed. Exam conducted with a chaperone present.   Constitutional:       Appearance: Normal appearance.   HENT:      Head: Atraumatic.      Mouth/Throat:      Mouth: Mucous membranes are moist.      Pharynx: Oropharynx is clear.   Eyes:      Conjunctiva/sclera: Conjunctivae normal.      Pupils: Pupils are equal, round, and reactive to light.   Cardiovascular:      Rate and Rhythm: Normal rate and regular rhythm.      Pulses: Normal pulses.      Heart sounds: Murmur heard.    Pulmonary:      Effort: Pulmonary effort is normal.      Breath sounds: Normal breath sounds.   Abdominal:      General: Abdomen is flat. Bowel sounds are normal.      Palpations: Abdomen is soft.   Musculoskeletal:         General: No deformity or signs of injury. Normal range of motion.      Right lower leg: No edema.      Left lower leg: No edema.   Skin:     General: Skin is warm and dry.      Capillary Refill: Capillary refill takes less than 2 seconds.      Coloration: Skin is not jaundiced or pale.      Findings: No bruising, lesion or rash.   Neurological:      General: No focal deficit present.      Mental Status: She is alert and oriented to person, place, and time.   Psychiatric:         Mood and Affect: Mood normal.            CRANIAL NERVES     CN III, IV, VI   Pupils are equal, round, and reactive to light.       Significant Labs: All pertinent labs within the past 24 hours have been reviewed.    Significant Imaging: I have reviewed all pertinent imaging results/findings within the past 24 hours.  Assessment/Plan:     * Atrial flutter  Will continue BB and hold CCB.  Hopefully patient will be able to tolerate increased dose if needed.    Will check a TSH.  Monitor closely on telemetry.    NPO after midnight.  Cardiology to evaluate for cardioversion or transfer for radiofrequency catheter ablation if indicated.   Patient is already on full anticoagulation for PAF and will continue.         Chronic anemia  Patient's anemia is currently controlled. Has not received any PRBCs to date. Etiology likely d/t  unknown  Current CBC reviewed-   Lab Results   Component Value Date    HGB 8.5 (L) 12/13/2023    HCT 25.3 (L) 12/13/2023     Monitor serial CBC and transfuse if patient becomes hemodynamically unstable, symptomatic or H/H drops below 7/21.    Will check full anemia panel again to include LDH, haptoglobin and HgbEP.  TB normal so most likely not hemolyzing but will check DARREN for completeness and due to  the fact patient is on eliquis will check hemoccult.  Patient is on PPI but no endoscopic evaluation noted.  Will need to follow up with GI as labs indicate.      I have added a T&S with AM labs and with tachycardia and hypotension would consider transfusion if further decline.    Chronic kidney disease, stage 2 (mild)  Creatine stable for now. BMP reviewed- noted Estimated Creatinine Clearance: 38.2 mL/min (based on SCr of 0.92 mg/dL). according to latest data. Based on current GFR, CKD stage is stage 2 - GFR 60-89.  Monitor UOP and serial BMP and adjust therapy as needed. Renally dose meds. Avoid nephrotoxic medications and procedures.    Stroke  Will continue  anticoagulation and statin.          Epilepsy, unspecified, not intractable, without status epilepticus  Continue OP keppra.          VTE Risk Mitigation (From admission, onward)           Ordered     apixaban tablet 5 mg  2 times daily         12/13/23 1838                       On 12/14/2023, patient should be placed in hospital observation services under my care.        Used  #[id number] to aid in the continuation of care of the patient. No needs voiced at present time.        Cherri Ahmadi MD  Department of Hospital Medicine  [facility] - Emergency Department

## 2023-12-14 NOTE — ASSESSMENT & PLAN NOTE
Appreciate Cardiology consult; CHARLES/cardioversion possibly pending; TSH lower end of normal; on Metoprolol and Eliquis

## 2023-12-14 NOTE — HPI
84 yo F (looks younger than stated age) presents to the ED with chest pain and palpitations.  Patient speaks primarily Cambodian language but  does speak some English.  Works well with the translation audio available at bedside and nurse is able to communicate well with patient using this device.  Patient is not having any dyspnea but she does have a chronic anemia that is at her baseline between 8-9 g/dL (had anemia panel drawn a year ago with normal ferritin and felt to be chronic).  She also has an elevated proBNP at  but review of records show this is also at baseline.      Upon arrival patient was found to have tachycardia but no EKG changes consistent with acute ischemia (trops 16 to 16).   Concern for afib RVR because has past history.  Cardiology consulted and felt to be atrial flutter.  CXR and abd film unremarkable.  Echo 10/20/23 for aortic murmur was also unremarkable.  Patient did admit to stopping her BB due to hypotension.  Patient is also on norvasc.      She has no complaints at this time but her HR is still at 128 bpm.  She has been restarted on her BB and cardiology consult has been reviewed.  Patient is diabetic but BS well controlled and will check current A1c.

## 2023-12-15 VITALS
WEIGHT: 128 LBS | OXYGEN SATURATION: 96 % | SYSTOLIC BLOOD PRESSURE: 99 MMHG | DIASTOLIC BLOOD PRESSURE: 53 MMHG | HEIGHT: 57 IN | RESPIRATION RATE: 18 BRPM | BODY MASS INDEX: 27.61 KG/M2 | TEMPERATURE: 99 F | HEART RATE: 115 BPM

## 2023-12-15 LAB
ERYTHROCYTE [DISTWIDTH] IN BLOOD BY AUTOMATED COUNT: 14 % (ref 11.5–14.5)
GLUCOSE SERPL-MCNC: 74 MG/DL (ref 70–105)
GLUCOSE SERPL-MCNC: 82 MG/DL (ref 70–105)
GLUCOSE SERPL-MCNC: 96 MG/DL (ref 70–105)
HCT VFR BLD AUTO: 24.5 % (ref 38–47)
HGB A1 MFR BLD ELPH: 98.3 % (ref 95.8–98)
HGB A2 MFR BLD ELPH: 1.7 % (ref 2–3.3)
HGB BLD-MCNC: 8.2 G/DL (ref 12–16)
MCV RBC AUTO: 91.1 FL (ref 80–96)
PATH INTERP BLD-IMP: ABNORMAL
RBC # BLD AUTO: 2.69 M/UL (ref 4.2–5.4)

## 2023-12-15 PROCEDURE — 93010 ELECTROCARDIOGRAM REPORT: CPT | Mod: ,,, | Performed by: STUDENT IN AN ORGANIZED HEALTH CARE EDUCATION/TRAINING PROGRAM

## 2023-12-15 PROCEDURE — 82962 GLUCOSE BLOOD TEST: CPT

## 2023-12-15 PROCEDURE — 93005 ELECTROCARDIOGRAM TRACING: CPT

## 2023-12-15 PROCEDURE — 99499 NO LOS: ICD-10-PCS | Mod: ,,, | Performed by: STUDENT IN AN ORGANIZED HEALTH CARE EDUCATION/TRAINING PROGRAM

## 2023-12-15 PROCEDURE — 93010 EKG 12-LEAD: ICD-10-PCS | Mod: ,,, | Performed by: STUDENT IN AN ORGANIZED HEALTH CARE EDUCATION/TRAINING PROGRAM

## 2023-12-15 PROCEDURE — 25000003 PHARM REV CODE 250: Performed by: HOSPITALIST

## 2023-12-15 PROCEDURE — 99239 HOSP IP/OBS DSCHRG MGMT >30: CPT | Mod: ,,, | Performed by: HOSPITALIST

## 2023-12-15 PROCEDURE — 99499 UNLISTED E&M SERVICE: CPT | Mod: ,,, | Performed by: STUDENT IN AN ORGANIZED HEALTH CARE EDUCATION/TRAINING PROGRAM

## 2023-12-15 PROCEDURE — 63600175 PHARM REV CODE 636 W HCPCS: Performed by: NURSE PRACTITIONER

## 2023-12-15 PROCEDURE — 99239 PR HOSPITAL DISCHARGE DAY,>30 MIN: ICD-10-PCS | Mod: ,,, | Performed by: HOSPITALIST

## 2023-12-15 RX ADMIN — AMIODARONE HYDROCHLORIDE 0.5 MG/MIN: 1.8 INJECTION, SOLUTION INTRAVENOUS at 10:12

## 2023-12-15 RX ADMIN — LEVETIRACETAM 500 MG: 500 TABLET, FILM COATED ORAL at 09:12

## 2023-12-15 RX ADMIN — APIXABAN 5 MG: 5 TABLET, FILM COATED ORAL at 09:12

## 2023-12-15 RX ADMIN — PANTOPRAZOLE SODIUM 40 MG: 40 TABLET, DELAYED RELEASE ORAL at 09:12

## 2023-12-15 RX ADMIN — ATORVASTATIN CALCIUM 20 MG: 20 TABLET, FILM COATED ORAL at 09:12

## 2023-12-15 NOTE — NURSING
D/c instructions reviewed via interruporter -pt/ voiced understanding. D/c out e.r. entrance where pt.  parked via wheelchair. Nadn. Safety precautions in use. Tele returned to 6n

## 2023-12-15 NOTE — PHYSICIAN QUERY
5PT Name: Zbigniew Ventura  MR #: [id number]    DOCUMENTATION CLARIFICATION     CDS/: Brook Caldwell RN CDIS           Contact information: [email or web address]    This form is a permanent document in the medical record.     Query Date: December 15, 2023    By submitting this query, we are merely seeking further clarification of documentation. Please utilize your independent clinical judgment when addressing the question(s) below.    The Medical Record contains the following:    Indicators Supporting Clinical Findings Location in Medical Record   x Atrial Flutter Atrial flutter  - Patient seen and evaluated by Dr. Reyes  - Aflutter, rate 120s  - TSH pending  - Start metoprolol 25 mg BID  - Monitor overnight on tele for any evidence of tachy-rudi syndrome and continue monitoring BP  - May consider CHARLES cardioversion tomorrow if needed  - Continue Eliquis  - Consider outpatient ischemic evaluation for reported chest pain (troponin negative x 2, no ischemic changes on EKG)  - Further recommendations to follow      She was found to be in atrial flutter and Cardiology was consulted who placed her on an Amiodarone drip.  She converted to sinus rhythm with tachycardia and was not a candidate for any intervention.  Cards consult                           Discharge summary    x EKG Results EKG gama, rate 128 without ischemic changes  Cards consult    x Medication amiodarone 360 mg/200 mL (1.8 mg/mL) infusion  Rate: 16.7 mL/hr Dose: 0.5 mg/min  Freq: Continuous Route: IV  Start: 12/14/23 1915     metoprolol tartrate (LOPRESSOR) tablet 25 mg  Dose: 25 mg  Freq: 2 times daily Route: Oral  Start: 12/13/23 1645 End: 12/14/23 1255    MAR           MAR     Treatment      Other       The clinical guidelines noted are only a system guideline. It does not replace the provider's clinical judgment.    Provider, please further specify the atrial flutter.    [   ] Typical (Type I) - Diagnosis based on morphological criteria on EKG  when the cavo-tricuspid isthmus region is involved   [   ] Atypical (Type II) - Diagnosis based on morphological criteria on EKG (not meeting typical atrial flutter criteria because the cavo-tricuspid isthmus region is not involved)   [   ] Other cardiac diagnosis (please specify):_______________   [  ] Clinically Undetermined           Please document in your progress notes daily for the duration of treatment until resolved, and include in your discharge summary.    Reference:    PARAG Dalton MD, [facility], [facility], SARINA Estes MD, [facility], [facility], ALLEN Acosta MD, [facility], [facility], & MARCELINO Monzon MD. (2019, May 27). Overview of atrial flutter (CHANDNI Lomas MD, Ed.). Retrieved October 22, 2020, from [email or web address]/zlyxdjwk-vx-uvkkvk-flutter?search=atrial%20flutter&source=search_result&selectedTitle=1~150&usage_type=default&display_rank=1    Form No. 87151

## 2023-12-15 NOTE — PLAN OF CARE
Chart reviewed, CHARLES/Cardioversion possibly pending. TSH lower end of normal, pt on Amiodarone and Eliquis. Cardiology following. SW will cont to follow for dc needs.

## 2023-12-15 NOTE — PHYSICIAN QUERY
5PT Name: Zbigniew Ventura  MR #: [id number]    DOCUMENTATION CLARIFICATION     CDS/: Brook Caldwell RN CDIS           Contact information: [email or web address]    This form is a permanent document in the medical record.     Query Date: December 15, 2023    By submitting this query, we are merely seeking further clarification of documentation. Please utilize your independent clinical judgment when addressing the question(s) below.    The Medical Record contains the following:    Indicators Supporting Clinical Findings Location in Medical Record   x Atrial Flutter Atrial flutter  - Patient seen and evaluated by Dr. Reyes  - Aflutter, rate 120s  - TSH pending  - Start metoprolol 25 mg BID  - Monitor overnight on tele for any evidence of tachy-rudi syndrome and continue monitoring BP  - May consider CHARLES cardioversion tomorrow if needed  - Continue Eliquis  - Consider outpatient ischemic evaluation for reported chest pain (troponin negative x 2, no ischemic changes on EKG)  - Further recommendations to follow      She was found to be in atrial flutter and Cardiology was consulted who placed her on an Amiodarone drip.  She converted to sinus rhythm with tachycardia and was not a candidate for any intervention.  Cards consult                           Discharge summary    x EKG Results EKG gama, rate 128 without ischemic changes  Cards consult    x Medication amiodarone 360 mg/200 mL (1.8 mg/mL) infusion  Rate: 16.7 mL/hr Dose: 0.5 mg/min  Freq: Continuous Route: IV  Start: 12/14/23 1915     metoprolol tartrate (LOPRESSOR) tablet 25 mg  Dose: 25 mg  Freq: 2 times daily Route: Oral  Start: 12/13/23 1645 End: 12/14/23 1255    MAR           MAR     Treatment      Other       The clinical guidelines noted are only a system guideline. It does not replace the provider's clinical judgment.    Provider, please further specify the atrial flutter.    [   ] Typical (Type I) - Diagnosis based on morphological criteria on EKG  when the cavo-tricuspid isthmus region is involved   [   ] Atypical (Type II) - Diagnosis based on morphological criteria on EKG (not meeting typical atrial flutter criteria because the cavo-tricuspid isthmus region is not involved)   [   ] Other cardiac diagnosis (please specify):_______________   [  x ] Clinically Undetermined           Please document in your progress notes daily for the duration of treatment until resolved, and include in your discharge summary.    Reference:    PARAG Dalton MD, [facility], [facility], SARINA Estes MD, [facility], [facility], ALLEN Acosta MD, [facility], [facility], & MARCELINO Monzon MD. (2019, May 27). Overview of atrial flutter (CHANDNI Lomas MD, Ed.). Retrieved October 22, 2020, from https://www.[employer]/contents/jopmikny-mv-ykchmp-flutter?search=atrial%20flutter&source=search_result&selectedTitle=1~150&usage_type=default&display_rank=1    Form No. [id number]

## 2023-12-15 NOTE — DISCHARGE SUMMARY
[facility] Hospital Medicine  Discharge Summary      Patient Name: Zbigniew Ventura  MRN: [id number]  JILLIAN: [id number]  Patient Class: IP- Inpatient  Admission Date: 12/13/2023  Hospital Length of Stay: 1 days  Discharge Date and Time:  12/15/2023 12:53 PM  Attending Physician: Alisson Solis DO   Discharging Provider: Alisson Solis DO  Primary Care Provider: Farhad Culver MD      HPI:   82 yo F (looks younger than stated age) presents to the ED with chest pain and palpitations.  Patient speaks primarily Cambodian language but  does speak some English.  Works well with the translation audio available at bedside and nurse is able to communicate well with patient using this device.  Patient is not having any dyspnea but she does have a chronic anemia that is at her baseline between 8-9 g/dL (had anemia panel drawn a year ago with normal ferritin and felt to be chronic).  She also has an elevated proBNP at 3K but review of records show this is also at baseline.      Upon arrival patient was found to have tachycardia but no EKG changes consistent with acute ischemia (trops 16 to 16).   Concern for afib RVR because has past history.  Cardiology consulted and felt to be atrial flutter.  CXR and abd film unremarkable.  Echo 10/20/23 for aortic murmur was also unremarkable.  Patient did admit to stopping her BB due to hypotension.  Patient is also on norvasc.      She has no complaints at this time but her HR is still at 128 bpm.  She has been restarted on her BB and cardiology consult has been reviewed.  Patient is diabetic but BS well controlled and will check current A1c.            Hospital Course:   83 year old female with a PMH of atrial flutter, seizures, CVA, and chronic renal disease presents to the hospital with chest pain.  She does not speak English.  She was found to be in atrial flutter and Cardiology was consulted who placed her on an Amiodarone drip.  She  converted to sinus rhythm with tachycardia and was not a candidate for any intervention.  She is on Metoprolol and Eliquis at home and will resume these meds upon discharge.  CT chest did not show PE.  TSH low end of normal.  Patient denies chest pain, shortness of breath, nausea, vomiting, or diarrhea and is stable for discharge.       Vitals:    12/15/23 0300 12/15/23 0530 12/15/23 0645 12/15/23 1015   BP: 108/61 (!) 95/56 123/75 120/74   BP Location:   Left arm Left arm   Patient Position:   Lying Lying   Pulse: (!) 115 (!) 114 (!) 113 110   Resp: 18  20 18   Temp: 99 °F (37.2 °C)   99.5 °F (37.5 °C)   TempSrc:    Oral   SpO2: 99%  98% 98%   Weight:       Height:           GEN: NAD; alert  CVS: regular rate and rhythm; tachycardic  RESP: no rhonchi, rales, or wheezes  GI: non-distended  EXTR: no edema noted      Final Active Diagnoses:    Diagnosis Date Noted POA    PRINCIPAL PROBLEM:  Atrial flutter [I48.92] 10/17/2022 Yes     Chronic    Paroxysmal atrial fibrillation [I48.0] 11/07/2022 Yes    Epilepsy, unspecified, not intractable, without status epilepticus [G40.909] 12/14/2023 Yes    Stroke [I63.9] 12/14/2023 Yes    Chronic anemia [D64.9] 12/14/2023 Yes    Chronic kidney disease, stage 2 (mild) [N18.2] 12/14/2023 Yes      Problems Resolved During this Admission:       Discharged Condition: stable    Disposition: home    Follow Up:   Follow-up Information       Kimi Reyes MD Follow up in 2 week(s).    Specialty: Cardiology  Contact information:  [address] [facility] [address]  [phone]               Farhad Culver MD Follow up.    Specialties: Internal Medicine, Family Medicine, Hospitalist  Contact information:  [address] [place] [address]  [phone]                              Medications:  Reconciled Home Medications:      Medication List        CONTINUE taking these medications      amLODIPine 5 MG tablet  Commonly known as: NORVASC  Take 1 tablet (5 mg total) by mouth once  daily.     atorvastatin 20 MG tablet  Commonly known as: LIPITOR  Take 1 tablet (20 mg total) by mouth once daily.     ELIQUIS 5 mg Tab  Generic drug: apixaban  TAKE ONE (1) TABLET BY MOUTH TWICE DAILY AS DIRECTED     FeroSuL 325 mg (65 mg iron) Tab tablet  Generic drug: ferrous sulfate  TAKE ONE (1) TABLET BY MOUTH TWICE DAILY     levETIRAcetam 500 MG Tab  Commonly known as: KEPPRA  Take 1 tablet (500 mg total) by mouth 2 (two) times daily.     metoprolol succinate 25 MG 24 hr tablet  Commonly known as: TOPROL-XL  Take 0.5 tablets (12.5 mg total) by mouth 2 (two) times daily.     omeprazole 20 MG capsule  Commonly known as: PRILOSEC  Take 1 capsule (20 mg total) by mouth once daily.     pioglitazone 30 MG tablet  Commonly known as: ACTOS  Take 1 tablet (30 mg total) by mouth once daily.            STOP taking these medications      diphenoxylate-atropine 2.5-0.025 mg 2.5-0.025 mg per tablet  Commonly known as: LOMOTIL     meclizine 25 MG tablet  Commonly known as: ANTIVERT              All of the information has been discussed with the patient and/or family who acknowledged verbal understanding.        Time spent on the discharge of patient: 45 minutes         Alisson Solis DO  Department of Hospital Medicine  [facility]

## 2023-12-15 NOTE — PLAN OF CARE
[facility]  Discharge Final Note    Primary Care Provider: Farhad Culver MD    Expected Discharge Date: 12/15/2023    Final Discharge Note (most recent)       Final Note - 12/15/23 1410          Final Note    Assessment Type Final Discharge Note     Anticipated Discharge Disposition Home or Self Care     What phone number can be called within the next 1-3 days to see how you are doing after discharge? [phone]        Post-Acute Status    Discharge Delays None known at this time                     Important Message from Medicare  Important Message from Medicare regarding Discharge Appeal Rights: Signed/date by patient/caregiver, Explained to patient/caregiver     Date IMM was signed: 12/15/23  Time IMM was signed: 1305    Contact Info       Kimi Reyes MD   Specialty: Cardiology    [address] [facility] [address]   Phone: [phone]       Next Steps: Follow up in 2 week(s)    Farhad Culver MD   Specialty: Internal Medicine, Family Medicine, Hospitalist   Relationship: PCP - General    [address] [facility] [address]   Phone: [phone]       Next Steps: Follow up          Pt to dc home today, no needs.

## 2023-12-15 NOTE — PLAN OF CARE
Problem: Adult Inpatient Plan of Care  Goal: Plan of Care Review  Outcome: Ongoing, Progressing  Flowsheets (Taken 12/15/2023 1513)  Plan of Care Reviewed With: patient  Goal: Patient-Specific Goal (Individualized)  Outcome: Ongoing, Progressing  Goal: Absence of Hospital-Acquired Illness or Injury  Outcome: Ongoing, Progressing  Intervention: Identify and Manage Fall Risk  Flowsheets (Taken 12/15/2023 1513)  Safety Promotion/Fall Prevention:   Fall Risk signage in place   family to remain at bedside   nonskid shoes/socks when out of bed   assistive device/personal item within reach  Intervention: Prevent Skin Injury  Flowsheets (Taken 12/15/2023 1513)  Body Position: position changed independently  Skin Protection: adhesive use limited  Intervention: Prevent and Manage VTE (Venous Thromboembolism) Risk  Flowsheets (Taken 12/15/2023 1513)  Activity Management: Ambulated in room - L4  VTE Prevention/Management: remove, assess skin, and reapply sequential compression device  Range of Motion: active ROM (range of motion) encouraged  Goal: Optimal Comfort and Wellbeing  Outcome: Ongoing, Progressing  Intervention: Monitor Pain and Promote Comfort  Flowsheets (Taken 12/15/2023 1513)  Pain Management Interventions: pain management plan reviewed with patient/caregiver  Intervention: Provide Person-Centered Care  Flowsheets (Taken 12/15/2023 1513)  Trust Relationship/Rapport:   care explained   questions encouraged   choices provided   reassurance provided   emotional support provided   thoughts/feelings acknowledged   empathic listening provided   questions answered  Goal: Readiness for Transition of Care  Outcome: Ongoing, Not Progressing     Problem: Fall Injury Risk  Goal: Absence of Fall and Fall-Related Injury  Outcome: Ongoing, Progressing  Intervention: Identify and Manage Contributors  Flowsheets (Taken 12/15/2023 1513)  Self-Care Promotion: BADL personal objects within reach  Medication Review/Management:    medications reviewed   dosing adjusted

## 2023-12-15 NOTE — PLAN OF CARE
Problem: Adult Inpatient Plan of Care  Goal: Plan of Care Review  12/15/2023 1604 by Krystyna Vivas RN  Outcome: Adequate for Care Transition  Flowsheets (Taken 12/15/2023 1604)  Plan of Care Reviewed With: patient  12/15/2023 1513 by Krystyna Vivas RN  Outcome: Ongoing, Progressing  Flowsheets (Taken 12/15/2023 1513)  Plan of Care Reviewed With: patient  Goal: Patient-Specific Goal (Individualized)  12/15/2023 1604 by Krystyna Vivas RN  Outcome: Adequate for Care Transition  12/15/2023 1513 by Krystyna iVvas RN  Outcome: Ongoing, Progressing  Goal: Absence of Hospital-Acquired Illness or Injury  12/15/2023 1604 by Krystyna Vivas RN  Outcome: Adequate for Care Transition  12/15/2023 1513 by Krystyna Vivas RN  Outcome: Ongoing, Progressing  Intervention: Identify and Manage Fall Risk  Flowsheets (Taken 12/15/2023 1513)  Safety Promotion/Fall Prevention:   Fall Risk signage in place   family to remain at bedside   nonskid shoes/socks when out of bed   assistive device/personal item within reach  Intervention: Prevent Skin Injury  Flowsheets (Taken 12/15/2023 1513)  Body Position: position changed independently  Skin Protection: adhesive use limited  Intervention: Prevent and Manage VTE (Venous Thromboembolism) Risk  Flowsheets (Taken 12/15/2023 1513)  Activity Management: Ambulated in room - L4  VTE Prevention/Management: remove, assess skin, and reapply sequential compression device  Range of Motion: active ROM (range of motion) encouraged  Goal: Optimal Comfort and Wellbeing  12/15/2023 1604 by Krystyna Vivas RN  Outcome: Adequate for Care Transition  12/15/2023 1513 by Krystyna Vivas RN  Outcome: Ongoing, Progressing  Intervention: Monitor Pain and Promote Comfort  Flowsheets (Taken 12/15/2023 1513)  Pain Management Interventions: pain management plan reviewed with patient/caregiver  Intervention: Provide Person-Centered Care  Flowsheets (Taken 12/15/2023 1513)  Trust Relationship/Rapport:   care  explained   questions encouraged   choices provided   reassurance provided   emotional support provided   thoughts/feelings acknowledged   empathic listening provided   questions answered  Goal: Readiness for Transition of Care  12/15/2023 1604 by Krystyna Vivas RN  Outcome: Adequate for Care Transition  12/15/2023 1513 by Krystyna Vivas RN  Outcome: Ongoing, Not Progressing     Problem: Fall Injury Risk  Goal: Absence of Fall and Fall-Related Injury  12/15/2023 1604 by Krystyna Vivas RN  Outcome: Adequate for Care Transition  12/15/2023 1513 by Krystyna Vivas RN  Outcome: Ongoing, Progressing  Intervention: Identify and Manage Contributors  Flowsheets (Taken 12/15/2023 1513)  Self-Care Promotion: BADL personal objects within reach  Medication Review/Management:   medications reviewed   dosing adjusted

## 2023-12-15 NOTE — PROGRESS NOTES
[facility] Medicine  Progress Note    Patient Name: Zbigniew Ventura  MRN: [id number]  Patient Class: IP- Inpatient   Admission Date: 12/13/2023  Length of Stay: 1 days  Attending Physician: Alisson Solis DO  Primary Care Provider: Farhad Culver MD        Subjective:     Principal Problem:Atrial flutter        HPI:  82 yo F (looks younger than stated age) presents to the ED with chest pain and palpitations.  Patient speaks primarily Cambodian language but  does speak some English.  Works well with the translation audio available at bedside and nurse is able to communicate well with patient using this device.  Patient is not having any dyspnea but she does have a chronic anemia that is at her baseline between 8-9 g/dL (had anemia panel drawn a year ago with normal ferritin and felt to be chronic).  She also has an elevated proBNP at 3K but review of records show this is also at baseline.      Upon arrival patient was found to have tachycardia but no EKG changes consistent with acute ischemia (trops 16 to 16).   Concern for afib RVR because has past history.  Cardiology consulted and felt to be atrial flutter.  CXR and abd film unremarkable.  Echo 10/20/23 for aortic murmur was also unremarkable.  Patient did admit to stopping her BB due to hypotension.  Patient is also on norvasc.      She has no complaints at this time but her HR is still at 128 bpm.  She has been restarted on her BB and cardiology consult has been reviewed.  Patient is diabetic but BS well controlled and will check current A1c.      Overview/Hospital Course:  83 year old female with a PMH of atrial flutter, seizures, CVA, and chronic renal disease presents to the hospital with chest pain.  She does not speak English, and the  machine in her ER room was not functioning.  Her  is at the bedside and speaks very minimal English.  He stated she had chest pain but now feels better and wants  to go home.        Vitals:    12/15/23 0036 12/15/23 0300 12/15/23 0530 12/15/23 0645   BP:  108/61 (!) 95/56 123/75   BP Location:    Left arm   Patient Position:    Lying   Pulse: (!) 117 (!) 115 (!) 114 (!) 113   Resp:  18  20   Temp:  99 °F (37.2 °C)     TempSrc:       SpO2:  99%  98%   Weight:       Height:             Assessment/Plan:      * Atrial flutter  CHARLES/cardioversion possibly pending; TSH lower end of normal; on Amiodarone and Eliquis    Paroxysmal atrial fibrillation  On Amiodarone and Eliquis; will leave to Cardiology    Chronic kidney disease, stage 2 (mild)  Cr fine; will  monitor    Chronic anemia  Hgb low but stable; occult blood negative; will monitor while on Eliquis    Stroke  On Eliquis and statin; stable      Epilepsy, unspecified, not intractable, without status epilepticus  On Keppra.              Alisson Solis DO  Department of Hospital Medicine   [facility]

## 2023-12-18 ENCOUNTER — PATIENT OUTREACH (OUTPATIENT)
Dept: ADMINISTRATIVE | Facility: CLINIC | Age: 83
End: 2023-12-18

## 2023-12-18 NOTE — PROGRESS NOTES
C3 nurse attempted to contact [facility]  for a TCC post hospital discharge follow up call. Spoke with daughter. The daughter is unable to conduct the call @ this time. The daughter requested a callback.    The patient has a scheduled [facility] appointment with Farhad Culver MD  on 12/27/23 @ 100.

## 2023-12-19 ENCOUNTER — TELEPHONE (OUTPATIENT)
Dept: FAMILY MEDICINE | Facility: CLINIC | Age: 83
End: 2023-12-19
Payer: MEDICARE

## 2023-12-19 NOTE — PROGRESS NOTES
C3 nurse attempted to contact [facility]  for a TCC post hospital discharge follow up call. No answer. Left voicemail with callback information. The patient has a scheduled [facility] appointment with Farhad Culver MD  on 12/27/23 @ 100.

## 2023-12-19 NOTE — TELEPHONE ENCOUNTER
----- Message from Farhad Culver MD sent at 12/19/2023  9:54 AM CST -----  Need to see in  1 week please  abnl results     1628 Pt is scheduled to come in on 12/26/23

## 2023-12-21 PROBLEM — I50.9 CONGESTIVE HEART FAILURE: Status: ACTIVE | Noted: 2023-12-21

## 2023-12-21 NOTE — PROGRESS NOTES
"Subjective:       Patient ID: Zbigniew Ventura is a 83 y.o. female.    Chief Complaint: Hypertension    Hypertension      .  Patient presents with shortness of breath dyspnea at rest dyspnea exertion and proximal nocturnal dyspnea.  Patient also complains of swelling in both lower extremities.  Patient has crackles bilaterally exam patient does have S3  Check a D-dimer BNP EKG BNP CBC PA and lateral chest x-ray also refer the patient to the hospital for CHF exacerbation.  I informed the patient that the patient will likely be hospitalized    Twelve point review of systems reviewed it is unremarkable except stated HPI  Current Medications:    Current Outpatient Medications:     amLODIPine (NORVASC) 5 MG tablet, Take 1 tablet (5 mg total) by mouth once daily., Disp: 90 tablet, Rfl: 1    atorvastatin (LIPITOR) 20 MG tablet, Take 1 tablet (20 mg total) by mouth once daily., Disp: 90 tablet, Rfl: 1    ELIQUIS 5 mg Tab, TAKE ONE (1) TABLET BY MOUTH TWICE DAILY AS DIRECTED, Disp: 60 tablet, Rfl: 11    FEROSUL 325 mg (65 mg iron) Tab tablet, TAKE ONE (1) TABLET BY MOUTH TWICE DAILY, Disp: 180 tablet, Rfl: 1    levETIRAcetam (KEPPRA) 500 MG Tab, Take 1 tablet (500 mg total) by mouth 2 (two) times daily., Disp: 120 tablet, Rfl: 1    metoprolol succinate (TOPROL-XL) 25 MG 24 hr tablet, Take 0.5 tablets (12.5 mg total) by mouth 2 (two) times daily., Disp: 15 tablet, Rfl: 1    omeprazole (PRILOSEC) 20 MG capsule, Take 1 capsule (20 mg total) by mouth once daily., Disp: 90 capsule, Rfl: 1    pioglitazone (ACTOS) 30 MG tablet, Take 1 tablet (30 mg total) by mouth once daily., Disp: 90 tablet, Rfl: 1           Review of Systems             Vitals:    12/13/23 1016 12/13/23 1023   BP: 127/74    BP Location: Right arm    Patient Position: Sitting    BP Method: Large (Automatic)    Pulse: (!) 128 (!) 127   Resp: 18    Temp: 97.5 °F (36.4 °C)    TempSrc: Temporal    SpO2: 100%    Weight: 58.1 kg (128 lb)    Height: 4' 9" (1.448 m)     "     Physical Exam  Vitals and nursing note reviewed.   Constitutional:       Appearance: Normal appearance.   HENT:      Head: Normocephalic and atraumatic.      Right Ear: Tympanic membrane, ear canal and external ear normal.      Left Ear: Tympanic membrane, ear canal and external ear normal.      Nose: Nose normal.      Mouth/Throat:      Mouth: Mucous membranes are moist.      Pharynx: Oropharynx is clear.   Eyes:      Extraocular Movements: Extraocular movements intact.      Conjunctiva/sclera: Conjunctivae normal.      Pupils: Pupils are equal, round, and reactive to light.   Cardiovascular:      Rate and Rhythm: Normal rate and regular rhythm.      Pulses: Normal pulses.      Heart sounds: Normal heart sounds.   Pulmonary:      Effort: Pulmonary effort is normal.      Breath sounds: Rales present.   Abdominal:      General: Abdomen is flat. Bowel sounds are normal.      Palpations: Abdomen is soft.   Musculoskeletal:         General: Normal range of motion.      Cervical back: Normal range of motion and neck supple.   Skin:     General: Skin is warm and dry.   Neurological:      General: No focal deficit present.      Mental Status: She is alert. Mental status is at baseline. She is disoriented.   Psychiatric:         Mood and Affect: Mood normal.         Behavior: Behavior normal.         Thought Content: Thought content normal.         Judgment: Judgment normal.           Last Labs:     Admission on 12/13/2023, Discharged on 12/15/2023   Component Date Value    Sodium 12/13/2023 132 (L)     Potassium 12/13/2023 3.6     Chloride 12/13/2023 100     CO2 12/13/2023 23     Anion Gap 12/13/2023 13     Glucose 12/13/2023 97     BUN 12/13/2023 13     Creatinine 12/13/2023 0.92     BUN/Creatinine Ratio 12/13/2023 14     Calcium 12/13/2023 8.6     Total Protein 12/13/2023 6.7     Albumin 12/13/2023 3.5     Globulin 12/13/2023 3.2     A/G Ratio 12/13/2023 1.1     Bilirubin, Total 12/13/2023 0.3     Alk Phos 12/13/2023  49 (L)     ALT 12/13/2023 19     AST 12/13/2023 21     eGFR 12/13/2023 63     Troponin I High Sensitiv* 12/13/2023 16.4     ProBNP 12/13/2023 3,109 (H)     Color, UA 12/13/2023 Colorless     Clarity, UA 12/13/2023 Clear     pH, UA 12/13/2023 6.0     Leukocytes, UA 12/13/2023 Negative     Nitrites, UA 12/13/2023 Negative     Protein, UA 12/13/2023 Negative     Glucose, UA 12/13/2023 Normal     Ketones, UA 12/13/2023 Negative     Urobilinogen, UA 12/13/2023 Normal     Bilirubin, UA 12/13/2023 Negative     Blood, UA 12/13/2023 Negative     Specific Gravity, UA 12/13/2023 1.009     WBC 12/13/2023 3.69 (L)     RBC 12/13/2023 2.79 (L)     Hemoglobin 12/13/2023 8.5 (L)     Hematocrit 12/13/2023 25.3 (L)     MCV 12/13/2023 90.7     MCH 12/13/2023 30.5     MCHC 12/13/2023 33.6     RDW 12/13/2023 13.9     Platelet Count 12/13/2023 314     MPV 12/13/2023 8.3 (L)     Neutrophils % 12/13/2023 68.8 (H)     Lymphocytes % 12/13/2023 22.2 (L)     Monocytes % 12/13/2023 6.8 (H)     Eosinophils % 12/13/2023 1.1     Basophils % 12/13/2023 0.8     Immature Granulocytes % 12/13/2023 0.3     nRBC, Auto 12/13/2023 0.0     Neutrophils, Abs 12/13/2023 2.54     Lymphocytes, Absolute 12/13/2023 0.82 (L)     Monocytes, Absolute 12/13/2023 0.25     Eosinophils, Absolute 12/13/2023 0.04     Basophils, Absolute 12/13/2023 0.03     Immature Granulocytes, A* 12/13/2023 0.01     nRBC, Absolute 12/13/2023 0.00     Diff Type 12/13/2023 Auto     Troponin I High Sensitiv* 12/13/2023 16.5     SARS COV-2 MOLECULAR 12/13/2023 Negative     Sodium 12/14/2023 136     Potassium 12/14/2023 3.8     Chloride 12/14/2023 105     CO2 12/14/2023 25     Anion Gap 12/14/2023 10     Glucose 12/14/2023 112 (H)     BUN 12/14/2023 13     Creatinine 12/14/2023 0.89     BUN/Creatinine Ratio 12/14/2023 15     Calcium 12/14/2023 8.6     eGFR 12/14/2023 65     PTT 12/14/2023 36.1     PT 12/14/2023 14.3     INR 12/14/2023 1.12     Magnesium 12/14/2023 1.6 (L)     Triglycerides  12/14/2023 84     Cholesterol 12/14/2023 155     HDL Cholesterol 12/14/2023 74 (H)     Cholesterol/HDL Ratio (R* 12/14/2023 2.1     Non-HDL 12/14/2023 81     LDL Calculated 12/14/2023 64     VLDL 12/14/2023 17     TSH 12/14/2023 0.930     Hemoglobin A1C 12/14/2023 5.3     Estimated Average Glucose 12/14/2023 105     Vitamin B12 12/14/2023 414     Folate 12/14/2023 5.8     Ferritin 12/14/2023 38     Iron 12/14/2023 31 (L)     Iron Saturation 12/14/2023 11 (L)     TIBC 12/14/2023 282     RBC 12/14/2023 2.69 (L)     Hemoglobin 12/14/2023 8.2 (L)     Hematocrit 12/14/2023 24.5 (L)     MCV 12/14/2023 91.1     RDW 12/14/2023 14.0     Hemoglobin  A1 12/14/2023 98.3 (H)     Hemoglobin A2 12/14/2023 1.7 (L)     Pathologist Interpretati* 12/14/2023 Normal Hgb Electrophoresis     LDH 12/14/2023 178     Haptoglobin 12/14/2023 100     Direct Raj (DARREN) 12/14/2023 NEG     WBC 12/14/2023 3.63 (L)     RBC 12/14/2023 2.72 (L)     Hemoglobin 12/14/2023 8.2 (L)     Hematocrit 12/14/2023 24.6 (L)     MCV 12/14/2023 90.4     MCH 12/14/2023 30.1     MCHC 12/14/2023 33.3     RDW 12/14/2023 14.1     Platelet Count 12/14/2023 334     MPV 12/14/2023 8.2 (L)     Neutrophils % 12/14/2023 58.6     Lymphocytes % 12/14/2023 30.9     Monocytes % 12/14/2023 6.6 (H)     Eosinophils % 12/14/2023 2.2     Basophils % 12/14/2023 1.4 (H)     Immature Granulocytes % 12/14/2023 0.3     nRBC, Auto 12/14/2023 0.0     Neutrophils, Abs 12/14/2023 2.13     Lymphocytes, Absolute 12/14/2023 1.12     Monocytes, Absolute 12/14/2023 0.24     Eosinophils, Absolute 12/14/2023 0.08     Basophils, Absolute 12/14/2023 0.05     Immature Granulocytes, A* 12/14/2023 0.01     nRBC, Absolute 12/14/2023 0.00     Diff Type 12/14/2023 Auto     Fecal Occult Blood 12/14/2023 Negative     Specimen Outdate 12/14/2023 12/17/2023 23:59     Group & Rh 12/14/2023 A POS     Indirect Raj 12/14/2023 NEG     POC Glucose 12/14/2023 108 (H)     POC Glucose 12/14/2023 115 (H)     POC  Glucose 12/14/2023 163 (H)     POC Glucose 12/14/2023 146 (H)     POC Glucose 12/15/2023 74     POC Glucose 12/15/2023 82     POC Glucose 12/15/2023 96    Office Visit on 12/13/2023   Component Date Value    IN Interval 12/13/2023 119     QRS Duration 12/13/2023 76     QT/QTc 12/13/2023 316/461     PRT Axes 12/13/2023 -68/79/78     Heart Rate 12/13/2023 128     Results 12/13/2023      D-Dimer 12/13/2023 <0.27     Sodium 12/13/2023 131 (L)     Potassium 12/13/2023 4.0     Chloride 12/13/2023 96 (L)     CO2 12/13/2023 28     Anion Gap 12/13/2023 11     Glucose 12/13/2023 94     BUN 12/13/2023 14     Creatinine 12/13/2023 0.99     BUN/Creatinine Ratio 12/13/2023 14     Calcium 12/13/2023 9.2     eGFR 12/13/2023 57 (L)     ProBNP 12/13/2023 3,441 (H)     WBC 12/13/2023 3.89 (L)     RBC 12/13/2023 2.76 (L)     Hemoglobin 12/13/2023 8.3 (L)     Hematocrit 12/13/2023 24.8 (L)     MCV 12/13/2023 89.9     MCH 12/13/2023 30.1     MCHC 12/13/2023 33.5     RDW 12/13/2023 13.7     Platelet Count 12/13/2023 360     MPV 12/13/2023 8.4 (L)     Neutrophils % 12/13/2023 72.3 (H)     Lymphocytes % 12/13/2023 19.0 (L)     Monocytes % 12/13/2023 6.4 (H)     Eosinophils % 12/13/2023 1.0     Basophils % 12/13/2023 1.0     Immature Granulocytes % 12/13/2023 0.3     nRBC, Auto 12/13/2023 0.0     Neutrophils, Abs 12/13/2023 2.81     Lymphocytes, Absolute 12/13/2023 0.74 (L)     Monocytes, Absolute 12/13/2023 0.25     Eosinophils, Absolute 12/13/2023 0.04     Basophils, Absolute 12/13/2023 0.04     Immature Granulocytes, A* 12/13/2023 0.01     nRBC, Absolute 12/13/2023 0.00     Diff Type 12/13/2023 Auto        Last Imaging:  Intra-Procedure Documentation  CHARLES performed in the Invasive Lab    - See Procedure Log link below for nursing documentation    - See CHARLES order on Card Proc Tab for physician findings          **Labs and x-rays personally reviewed by me    ** reviewed      Objective:        Assessment:       1. Congestive heart  failure, unspecified HF chronicity, unspecified heart failure type  POCT EKG 12-LEAD (Manually Resulted by Ordering Provider)    D-Dimer, Quantitative    Basic Metabolic Panel    CBC Auto Differential    NT-Pro Natriuretic Peptide    X-Ray Chest PA And Lateral    D-Dimer, Quantitative    Basic Metabolic Panel    CBC Auto Differential    NT-Pro Natriuretic Peptide           Plan:         1. Congestive heart failure, unspecified HF chronicity, unspecified heart failure type  -     POCT EKG 12-LEAD (Manually Resulted by Ordering Provider)  -     D-Dimer, Quantitative; Future; Expected date: 12/13/2023  -     Basic Metabolic Panel; Future; Expected date: 12/13/2023  -     CBC Auto Differential; Future; Expected date: 12/13/2023  -     NT-Pro Natriuretic Peptide; Future; Expected date: 12/13/2023  -     X-Ray Chest PA And Lateral; Future; Expected date: 12/13/2023

## 2023-12-27 ENCOUNTER — OFFICE VISIT (OUTPATIENT)
Dept: FAMILY MEDICINE | Facility: CLINIC | Age: 83
End: 2023-12-27
Payer: MEDICARE

## 2023-12-27 VITALS
BODY MASS INDEX: 26.58 KG/M2 | SYSTOLIC BLOOD PRESSURE: 120 MMHG | HEART RATE: 119 BPM | HEIGHT: 57 IN | DIASTOLIC BLOOD PRESSURE: 70 MMHG | RESPIRATION RATE: 18 BRPM | TEMPERATURE: 98 F | WEIGHT: 123.19 LBS | OXYGEN SATURATION: 98 %

## 2023-12-27 DIAGNOSIS — R42 VERTIGO: ICD-10-CM

## 2023-12-27 DIAGNOSIS — I10 ESSENTIAL (PRIMARY) HYPERTENSION: Primary | ICD-10-CM

## 2023-12-27 PROCEDURE — 99499 UNLISTED E&M SERVICE: CPT | Mod: ,,, | Performed by: INTERNAL MEDICINE

## 2023-12-27 RX ORDER — LEVETIRACETAM 500 MG/1
500 TABLET ORAL 2 TIMES DAILY
Qty: 120 TABLET | Refills: 1 | Status: SHIPPED | OUTPATIENT
Start: 2023-12-27 | End: 2024-01-10 | Stop reason: SDUPTHER

## 2023-12-27 RX ORDER — PIOGLITAZONEHYDROCHLORIDE 30 MG/1
30 TABLET ORAL DAILY
Qty: 90 TABLET | Refills: 1 | Status: SHIPPED | OUTPATIENT
Start: 2023-12-27 | End: 2024-01-10 | Stop reason: SDUPTHER

## 2023-12-27 RX ORDER — FERROUS SULFATE 325(65) MG
325 TABLET ORAL DAILY
Qty: 180 TABLET | Refills: 1 | Status: SHIPPED | OUTPATIENT
Start: 2023-12-27 | End: 2024-01-10 | Stop reason: SDUPTHER

## 2023-12-27 RX ORDER — AMLODIPINE BESYLATE 5 MG/1
5 TABLET ORAL DAILY
Qty: 90 TABLET | Refills: 1 | Status: SHIPPED | OUTPATIENT
Start: 2023-12-27 | End: 2024-01-10 | Stop reason: SDUPTHER

## 2023-12-27 RX ORDER — MECLIZINE HCL 25MG 25 MG/1
TABLET, CHEWABLE ORAL
Qty: 90 TABLET | Refills: 1 | Status: CANCELLED | OUTPATIENT
Start: 2023-12-27

## 2023-12-27 RX ORDER — ATORVASTATIN CALCIUM 20 MG/1
20 TABLET, FILM COATED ORAL DAILY
Qty: 90 TABLET | Refills: 1 | Status: SHIPPED | OUTPATIENT
Start: 2023-12-27 | End: 2024-01-10 | Stop reason: SDUPTHER

## 2023-12-27 RX ORDER — OMEPRAZOLE 20 MG/1
20 CAPSULE, DELAYED RELEASE ORAL DAILY
Qty: 90 CAPSULE | Refills: 1 | Status: SHIPPED | OUTPATIENT
Start: 2023-12-27 | End: 2024-01-10 | Stop reason: SDUPTHER

## 2024-01-02 RX ORDER — METOPROLOL SUCCINATE 25 MG/1
12.5 TABLET, EXTENDED RELEASE ORAL 2 TIMES DAILY
Qty: 15 TABLET | Refills: 1 | Status: SHIPPED | OUTPATIENT
Start: 2024-01-02 | End: 2024-01-10 | Stop reason: SDUPTHER

## 2024-01-02 NOTE — PROGRESS NOTES
"Subjective:       Patient ID: Zbigniew Ventura is a 83 y.o. female.    Chief Complaint: Follow-up (2 week follow up )    HPI  .    Current Medications:    Current Outpatient Medications:     ELIQUIS 5 mg Tab, TAKE ONE (1) TABLET BY MOUTH TWICE DAILY AS DIRECTED, Disp: 60 tablet, Rfl: 11    amLODIPine (NORVASC) 5 MG tablet, Take 1 tablet (5 mg total) by mouth once daily., Disp: 90 tablet, Rfl: 1    atorvastatin (LIPITOR) 20 MG tablet, Take 1 tablet (20 mg total) by mouth once daily., Disp: 90 tablet, Rfl: 1    ferrous sulfate (FEROSUL) 325 mg (65 mg iron) Tab tablet, Take 1 tablet (325 mg total) by mouth once daily., Disp: 180 tablet, Rfl: 1    levETIRAcetam (KEPPRA) 500 MG Tab, Take 1 tablet (500 mg total) by mouth 2 (two) times daily., Disp: 120 tablet, Rfl: 1    metoprolol succinate (TOPROL-XL) 25 MG 24 hr tablet, Take 0.5 tablets (12.5 mg total) by mouth 2 (two) times daily., Disp: 15 tablet, Rfl: 1    omeprazole (PRILOSEC) 20 MG capsule, Take 1 capsule (20 mg total) by mouth once daily., Disp: 90 capsule, Rfl: 1    pioglitazone (ACTOS) 30 MG tablet, Take 1 tablet (30 mg total) by mouth once daily., Disp: 90 tablet, Rfl: 1           Review of Systems             Vitals:    12/27/23 1338   BP: 120/70   BP Location: Left arm   Patient Position: Sitting   BP Method: Medium (Manual)   Pulse: (!) 119   Resp: 18   Temp: 97.6 °F (36.4 °C)   TempSrc: Tympanic   SpO2: 98%   Weight: 55.9 kg (123 lb 3.2 oz)   Height: 4' 9" (1.448 m)        Physical Exam      Last Labs:     Admission on 12/13/2023, Discharged on 12/15/2023   Component Date Value    Sodium 12/13/2023 132 (L)     Potassium 12/13/2023 3.6     Chloride 12/13/2023 100     CO2 12/13/2023 23     Anion Gap 12/13/2023 13     Glucose 12/13/2023 97     BUN 12/13/2023 13     Creatinine 12/13/2023 0.92     BUN/Creatinine Ratio 12/13/2023 14     Calcium 12/13/2023 8.6     Total Protein 12/13/2023 6.7     Albumin 12/13/2023 3.5     Globulin 12/13/2023 3.2     A/G Ratio 12/13/2023 " 1.1     Bilirubin, Total 12/13/2023 0.3     Alk Phos 12/13/2023 49 (L)     ALT 12/13/2023 19     AST 12/13/2023 21     eGFR 12/13/2023 63     Troponin I High Sensitiv* 12/13/2023 16.4     ProBNP 12/13/2023 3,109 (H)     Color, UA 12/13/2023 Colorless     Clarity, UA 12/13/2023 Clear     pH, UA 12/13/2023 6.0     Leukocytes, UA 12/13/2023 Negative     Nitrites, UA 12/13/2023 Negative     Protein, UA 12/13/2023 Negative     Glucose, UA 12/13/2023 Normal     Ketones, UA 12/13/2023 Negative     Urobilinogen, UA 12/13/2023 Normal     Bilirubin, UA 12/13/2023 Negative     Blood, UA 12/13/2023 Negative     Specific Gravity, UA 12/13/2023 1.009     WBC 12/13/2023 3.69 (L)     RBC 12/13/2023 2.79 (L)     Hemoglobin 12/13/2023 8.5 (L)     Hematocrit 12/13/2023 25.3 (L)     MCV 12/13/2023 90.7     MCH 12/13/2023 30.5     MCHC 12/13/2023 33.6     RDW 12/13/2023 13.9     Platelet Count 12/13/2023 314     MPV 12/13/2023 8.3 (L)     Neutrophils % 12/13/2023 68.8 (H)     Lymphocytes % 12/13/2023 22.2 (L)     Monocytes % 12/13/2023 6.8 (H)     Eosinophils % 12/13/2023 1.1     Basophils % 12/13/2023 0.8     Immature Granulocytes % 12/13/2023 0.3     nRBC, Auto 12/13/2023 0.0     Neutrophils, Abs 12/13/2023 2.54     Lymphocytes, Absolute 12/13/2023 0.82 (L)     Monocytes, Absolute 12/13/2023 0.25     Eosinophils, Absolute 12/13/2023 0.04     Basophils, Absolute 12/13/2023 0.03     Immature Granulocytes, A* 12/13/2023 0.01     nRBC, Absolute 12/13/2023 0.00     Diff Type 12/13/2023 Auto     Troponin I High Sensitiv* 12/13/2023 16.5     SARS COV-2 MOLECULAR 12/13/2023 Negative     Sodium 12/14/2023 136     Potassium 12/14/2023 3.8     Chloride 12/14/2023 105     CO2 12/14/2023 25     Anion Gap 12/14/2023 10     Glucose 12/14/2023 112 (H)     BUN 12/14/2023 13     Creatinine 12/14/2023 0.89     BUN/Creatinine Ratio 12/14/2023 15     Calcium 12/14/2023 8.6     eGFR 12/14/2023 65     PTT 12/14/2023 36.1     PT 12/14/2023 14.3     INR  12/14/2023 1.12     Magnesium 12/14/2023 1.6 (L)     Triglycerides 12/14/2023 84     Cholesterol 12/14/2023 155     HDL Cholesterol 12/14/2023 74 (H)     Cholesterol/HDL Ratio (R* 12/14/2023 2.1     Non-HDL 12/14/2023 81     LDL Calculated 12/14/2023 64     VLDL 12/14/2023 17     TSH 12/14/2023 0.930     Hemoglobin A1C 12/14/2023 5.3     Estimated Average Glucose 12/14/2023 105     Vitamin B12 12/14/2023 414     Folate 12/14/2023 5.8     Ferritin 12/14/2023 38     Iron 12/14/2023 31 (L)     Iron Saturation 12/14/2023 11 (L)     TIBC 12/14/2023 282     RBC 12/14/2023 2.69 (L)     Hemoglobin 12/14/2023 8.2 (L)     Hematocrit 12/14/2023 24.5 (L)     MCV 12/14/2023 91.1     RDW 12/14/2023 14.0     Hemoglobin  A1 12/14/2023 98.3 (H)     Hemoglobin A2 12/14/2023 1.7 (L)     Pathologist Interpretati* 12/14/2023 Normal Hgb Electrophoresis     LDH 12/14/2023 178     Haptoglobin 12/14/2023 100     Direct Raj (DARREN) 12/14/2023 NEG     WBC 12/14/2023 3.63 (L)     RBC 12/14/2023 2.72 (L)     Hemoglobin 12/14/2023 8.2 (L)     Hematocrit 12/14/2023 24.6 (L)     MCV 12/14/2023 90.4     MCH 12/14/2023 30.1     MCHC 12/14/2023 33.3     RDW 12/14/2023 14.1     Platelet Count 12/14/2023 334     MPV 12/14/2023 8.2 (L)     Neutrophils % 12/14/2023 58.6     Lymphocytes % 12/14/2023 30.9     Monocytes % 12/14/2023 6.6 (H)     Eosinophils % 12/14/2023 2.2     Basophils % 12/14/2023 1.4 (H)     Immature Granulocytes % 12/14/2023 0.3     nRBC, Auto 12/14/2023 0.0     Neutrophils, Abs 12/14/2023 2.13     Lymphocytes, Absolute 12/14/2023 1.12     Monocytes, Absolute 12/14/2023 0.24     Eosinophils, Absolute 12/14/2023 0.08     Basophils, Absolute 12/14/2023 0.05     Immature Granulocytes, A* 12/14/2023 0.01     nRBC, Absolute 12/14/2023 0.00     Diff Type 12/14/2023 Auto     Fecal Occult Blood 12/14/2023 Negative     Specimen Outdate 12/14/2023 12/17/2023 23:59     Group & Rh 12/14/2023 A POS     Indirect Raj 12/14/2023 NEG     POC Glucose  12/14/2023 108 (H)     POC Glucose 12/14/2023 115 (H)     POC Glucose 12/14/2023 163 (H)     POC Glucose 12/14/2023 146 (H)     POC Glucose 12/15/2023 74     POC Glucose 12/15/2023 82     POC Glucose 12/15/2023 96    Office Visit on 12/13/2023   Component Date Value    IN Interval 12/13/2023 119     QRS Duration 12/13/2023 76     QT/QTc 12/13/2023 316/461     PRT Axes 12/13/2023 -68/79/78     Heart Rate 12/13/2023 128     Results 12/13/2023      D-Dimer 12/13/2023 <0.27     Sodium 12/13/2023 131 (L)     Potassium 12/13/2023 4.0     Chloride 12/13/2023 96 (L)     CO2 12/13/2023 28     Anion Gap 12/13/2023 11     Glucose 12/13/2023 94     BUN 12/13/2023 14     Creatinine 12/13/2023 0.99     BUN/Creatinine Ratio 12/13/2023 14     Calcium 12/13/2023 9.2     eGFR 12/13/2023 57 (L)     ProBNP 12/13/2023 3,441 (H)     WBC 12/13/2023 3.89 (L)     RBC 12/13/2023 2.76 (L)     Hemoglobin 12/13/2023 8.3 (L)     Hematocrit 12/13/2023 24.8 (L)     MCV 12/13/2023 89.9     MCH 12/13/2023 30.1     MCHC 12/13/2023 33.5     RDW 12/13/2023 13.7     Platelet Count 12/13/2023 360     MPV 12/13/2023 8.4 (L)     Neutrophils % 12/13/2023 72.3 (H)     Lymphocytes % 12/13/2023 19.0 (L)     Monocytes % 12/13/2023 6.4 (H)     Eosinophils % 12/13/2023 1.0     Basophils % 12/13/2023 1.0     Immature Granulocytes % 12/13/2023 0.3     nRBC, Auto 12/13/2023 0.0     Neutrophils, Abs 12/13/2023 2.81     Lymphocytes, Absolute 12/13/2023 0.74 (L)     Monocytes, Absolute 12/13/2023 0.25     Eosinophils, Absolute 12/13/2023 0.04     Basophils, Absolute 12/13/2023 0.04     Immature Granulocytes, A* 12/13/2023 0.01     nRBC, Absolute 12/13/2023 0.00     Diff Type 12/13/2023 Auto        Last Imaging:  Intra-Procedure Documentation  CHARLES performed in the Invasive Lab    - See Procedure Log link below for nursing documentation    - See CHARLES order on Card Proc Tab for physician findings   Electrophysiology Procedure  Aborted invasive cardiology procedure- see  Procedure Log link for details.         **Labs and x-rays personally reviewed by me    ** reviewed      Objective:        Assessment:       1. Essential (primary) hypertension             Plan:         1. Essential (primary) hypertension    Other orders  -     metoprolol succinate (TOPROL-XL) 25 MG 24 hr tablet; Take 0.5 tablets (12.5 mg total) by mouth 2 (two) times daily.  Dispense: 15 tablet; Refill: 1

## 2024-01-09 DIAGNOSIS — Z71.89 COMPLEX CARE COORDINATION: ICD-10-CM

## 2024-01-10 ENCOUNTER — OFFICE VISIT (OUTPATIENT)
Dept: FAMILY MEDICINE | Facility: CLINIC | Age: 84
End: 2024-01-10
Payer: MEDICARE

## 2024-01-10 VITALS
WEIGHT: 131.63 LBS | HEIGHT: 57 IN | BODY MASS INDEX: 28.4 KG/M2 | DIASTOLIC BLOOD PRESSURE: 83 MMHG | OXYGEN SATURATION: 100 % | RESPIRATION RATE: 18 BRPM | SYSTOLIC BLOOD PRESSURE: 121 MMHG | TEMPERATURE: 97 F

## 2024-01-10 DIAGNOSIS — I50.9 CONGESTIVE HEART FAILURE, UNSPECIFIED HF CHRONICITY, UNSPECIFIED HEART FAILURE TYPE: ICD-10-CM

## 2024-01-10 DIAGNOSIS — R42 VERTIGO: ICD-10-CM

## 2024-01-10 DIAGNOSIS — I10 ESSENTIAL (PRIMARY) HYPERTENSION: Primary | ICD-10-CM

## 2024-01-10 PROCEDURE — 99214 OFFICE O/P EST MOD 30 MIN: CPT | Mod: ,,, | Performed by: INTERNAL MEDICINE

## 2024-01-10 RX ORDER — LEVETIRACETAM 500 MG/1
500 TABLET ORAL 2 TIMES DAILY
Qty: 120 TABLET | Refills: 1 | Status: SHIPPED | OUTPATIENT
Start: 2024-01-10 | End: 2024-02-12 | Stop reason: SDUPTHER

## 2024-01-10 RX ORDER — POTASSIUM CHLORIDE 750 MG/1
10 CAPSULE, EXTENDED RELEASE ORAL DAILY
Qty: 90 CAPSULE | Refills: 0 | Status: SHIPPED | OUTPATIENT
Start: 2024-01-10 | End: 2024-02-12 | Stop reason: SDUPTHER

## 2024-01-10 RX ORDER — OMEPRAZOLE 20 MG/1
20 CAPSULE, DELAYED RELEASE ORAL DAILY
Qty: 90 CAPSULE | Refills: 1 | Status: SHIPPED | OUTPATIENT
Start: 2024-01-10 | End: 2024-02-12 | Stop reason: SDUPTHER

## 2024-01-10 RX ORDER — MECLIZINE HCL 25MG 25 MG/1
25 TABLET, CHEWABLE ORAL EVERY 12 HOURS PRN
COMMUNITY
Start: 2024-01-02 | End: 2024-01-10 | Stop reason: SDUPTHER

## 2024-01-10 RX ORDER — FUROSEMIDE 40 MG/1
40 TABLET ORAL DAILY
Qty: 90 TABLET | Refills: 0 | Status: SHIPPED | OUTPATIENT
Start: 2024-01-10 | End: 2024-02-12 | Stop reason: SDUPTHER

## 2024-01-10 RX ORDER — AMLODIPINE BESYLATE 5 MG/1
5 TABLET ORAL DAILY
Qty: 90 TABLET | Refills: 1 | Status: SHIPPED | OUTPATIENT
Start: 2024-01-10 | End: 2024-02-12 | Stop reason: SDUPTHER

## 2024-01-10 RX ORDER — ATORVASTATIN CALCIUM 20 MG/1
20 TABLET, FILM COATED ORAL DAILY
Qty: 90 TABLET | Refills: 1 | Status: SHIPPED | OUTPATIENT
Start: 2024-01-10 | End: 2024-02-12 | Stop reason: SDUPTHER

## 2024-01-10 RX ORDER — PIOGLITAZONEHYDROCHLORIDE 30 MG/1
30 TABLET ORAL DAILY
Qty: 90 TABLET | Refills: 1 | Status: SHIPPED | OUTPATIENT
Start: 2024-01-10 | End: 2024-02-12 | Stop reason: SDUPTHER

## 2024-01-10 RX ORDER — METOPROLOL SUCCINATE 25 MG/1
12.5 TABLET, EXTENDED RELEASE ORAL 2 TIMES DAILY
Qty: 15 TABLET | Refills: 1 | Status: SHIPPED | OUTPATIENT
Start: 2024-01-10 | End: 2024-02-12 | Stop reason: SDUPTHER

## 2024-01-10 RX ORDER — FERROUS SULFATE 325(65) MG
325 TABLET ORAL DAILY
Qty: 180 TABLET | Refills: 1 | Status: SHIPPED | OUTPATIENT
Start: 2024-01-10 | End: 2024-02-12 | Stop reason: SDUPTHER

## 2024-01-10 RX ORDER — MECLIZINE HCL 25MG 25 MG/1
25 TABLET, CHEWABLE ORAL EVERY 12 HOURS PRN
Qty: 90 TABLET | Refills: 1 | Status: SHIPPED | OUTPATIENT
Start: 2024-01-10 | End: 2024-02-12 | Stop reason: SDUPTHER

## 2024-01-15 PROBLEM — R42 VERTIGO: Status: ACTIVE | Noted: 2024-01-15

## 2024-01-15 PROBLEM — I10 ESSENTIAL (PRIMARY) HYPERTENSION: Status: ACTIVE | Noted: 2024-01-15

## 2024-01-15 NOTE — PROGRESS NOTES
Subjective:       Patient ID: Zbigniew Ventura is a 83 y.o. female.    Chief Complaint: Hypertension    Hypertension    Patient seen follow-up patient appears to be doing much better few weeks ago patient has CHF exacerbation today the patient is not short of breath patient is feeling fine patient has chronic hypertension blood pressure is controlled.  Patient needs a refill Lasix plan will be Lasix 40 mg 1 p.o. q.a.m. and KCl 10 mEq 1 p.o. q.a.m.  For the hypertension refill resume Norvasc 5 mg 1 p.o. q.day additional plans for dyslipidemia continue and resume Lipitor 20 mg 1 p.o. q.day. several other medicines will be refilled today Eliquis iron Keppra metoprolol omeprazole and Actos.  .    Current Medications:    Current Outpatient Medications:     amLODIPine (NORVASC) 5 MG tablet, Take 1 tablet (5 mg total) by mouth once daily., Disp: 90 tablet, Rfl: 1    apixaban (ELIQUIS) 5 mg Tab, TAKE ONE (1) TABLET BY MOUTH TWICE DAILY AS DIRECTED, Disp: 60 tablet, Rfl: 11    atorvastatin (LIPITOR) 20 MG tablet, Take 1 tablet (20 mg total) by mouth once daily., Disp: 90 tablet, Rfl: 1    ferrous sulfate (FEROSUL) 325 mg (65 mg iron) Tab tablet, Take 1 tablet (325 mg total) by mouth once daily., Disp: 180 tablet, Rfl: 1    furosemide (LASIX) 40 MG tablet, Take 1 tablet (40 mg total) by mouth once daily., Disp: 90 tablet, Rfl: 0    levETIRAcetam (KEPPRA) 500 MG Tab, Take 1 tablet (500 mg total) by mouth 2 (two) times daily., Disp: 120 tablet, Rfl: 1    meclizine (ANTIVERT) 25 MG tablet, Take 1 tablet (25 mg total) by mouth every 12 (twelve) hours as needed for Dizziness or Nausea., Disp: 90 tablet, Rfl: 1    metoprolol succinate (TOPROL-XL) 25 MG 24 hr tablet, Take 0.5 tablets (12.5 mg total) by mouth 2 (two) times daily., Disp: 15 tablet, Rfl: 1    omeprazole (PRILOSEC) 20 MG capsule, Take 1 capsule (20 mg total) by mouth once daily., Disp: 90 capsule, Rfl: 1    pioglitazone (ACTOS) 30 MG tablet, Take 1 tablet (30 mg total) by  "mouth once daily., Disp: 90 tablet, Rfl: 1    potassium chloride (MICRO-K) 10 MEQ CpSR, Take 1 capsule (10 mEq total) by mouth once daily., Disp: 90 capsule, Rfl: 0           Review of Systems             Vitals:    01/10/24 1042   BP: 121/83   BP Location: Right arm   Patient Position: Sitting   BP Method: Large (Automatic)   Resp: 18   Temp: 97.2 °F (36.2 °C)   TempSrc: Temporal   SpO2: 100%   Weight: 59.7 kg (131 lb 9.6 oz)   Height: 4' 9" (1.448 m)        Physical Exam  Vitals and nursing note reviewed.   Constitutional:       Appearance: Normal appearance.   Cardiovascular:      Rate and Rhythm: Normal rate and regular rhythm.      Pulses: Normal pulses.      Heart sounds: Normal heart sounds.   Pulmonary:      Effort: Pulmonary effort is normal.      Breath sounds: Normal breath sounds.   Abdominal:      General: Abdomen is flat. Bowel sounds are normal.      Palpations: Abdomen is soft.   Musculoskeletal:         General: Normal range of motion.   Skin:     General: Skin is warm and dry.   Neurological:      General: No focal deficit present.      Mental Status: She is alert and oriented to person, place, and time. Mental status is at baseline.           Last Labs:     Admission on 12/13/2023, Discharged on 12/15/2023   Component Date Value    Sodium 12/13/2023 132 (L)     Potassium 12/13/2023 3.6     Chloride 12/13/2023 100     CO2 12/13/2023 23     Anion Gap 12/13/2023 13     Glucose 12/13/2023 97     BUN 12/13/2023 13     Creatinine 12/13/2023 0.92     BUN/Creatinine Ratio 12/13/2023 14     Calcium 12/13/2023 8.6     Total Protein 12/13/2023 6.7     Albumin 12/13/2023 3.5     Globulin 12/13/2023 3.2     A/G Ratio 12/13/2023 1.1     Bilirubin, Total 12/13/2023 0.3     Alk Phos 12/13/2023 49 (L)     ALT 12/13/2023 19     AST 12/13/2023 21     eGFR 12/13/2023 63     Troponin I High Sensitiv* 12/13/2023 16.4     ProBNP 12/13/2023 3,109 (H)     Color, UA 12/13/2023 Colorless     Clarity, UA 12/13/2023 Clear     " pH, UA 12/13/2023 6.0     Leukocytes, UA 12/13/2023 Negative     Nitrites, UA 12/13/2023 Negative     Protein, UA 12/13/2023 Negative     Glucose, UA 12/13/2023 Normal     Ketones, UA 12/13/2023 Negative     Urobilinogen, UA 12/13/2023 Normal     Bilirubin, UA 12/13/2023 Negative     Blood, UA 12/13/2023 Negative     Specific Gravity, UA 12/13/2023 1.009     WBC 12/13/2023 3.69 (L)     RBC 12/13/2023 2.79 (L)     Hemoglobin 12/13/2023 8.5 (L)     Hematocrit 12/13/2023 25.3 (L)     MCV 12/13/2023 90.7     MCH 12/13/2023 30.5     MCHC 12/13/2023 33.6     RDW 12/13/2023 13.9     Platelet Count 12/13/2023 314     MPV 12/13/2023 8.3 (L)     Neutrophils % 12/13/2023 68.8 (H)     Lymphocytes % 12/13/2023 22.2 (L)     Monocytes % 12/13/2023 6.8 (H)     Eosinophils % 12/13/2023 1.1     Basophils % 12/13/2023 0.8     Immature Granulocytes % 12/13/2023 0.3     nRBC, Auto 12/13/2023 0.0     Neutrophils, Abs 12/13/2023 2.54     Lymphocytes, Absolute 12/13/2023 0.82 (L)     Monocytes, Absolute 12/13/2023 0.25     Eosinophils, Absolute 12/13/2023 0.04     Basophils, Absolute 12/13/2023 0.03     Immature Granulocytes, A* 12/13/2023 0.01     nRBC, Absolute 12/13/2023 0.00     Diff Type 12/13/2023 Auto     Troponin I High Sensitiv* 12/13/2023 16.5     SARS COV-2 MOLECULAR 12/13/2023 Negative     Sodium 12/14/2023 136     Potassium 12/14/2023 3.8     Chloride 12/14/2023 105     CO2 12/14/2023 25     Anion Gap 12/14/2023 10     Glucose 12/14/2023 112 (H)     BUN 12/14/2023 13     Creatinine 12/14/2023 0.89     BUN/Creatinine Ratio 12/14/2023 15     Calcium 12/14/2023 8.6     eGFR 12/14/2023 65     PTT 12/14/2023 36.1     PT 12/14/2023 14.3     INR 12/14/2023 1.12     Magnesium 12/14/2023 1.6 (L)     Triglycerides 12/14/2023 84     Cholesterol 12/14/2023 155     HDL Cholesterol 12/14/2023 74 (H)     Cholesterol/HDL Ratio (R* 12/14/2023 2.1     Non-HDL 12/14/2023 81     LDL Calculated 12/14/2023 64     VLDL 12/14/2023 17     TSH  12/14/2023 0.930     Hemoglobin A1C 12/14/2023 5.3     Estimated Average Glucose 12/14/2023 105     Vitamin B12 12/14/2023 414     Folate 12/14/2023 5.8     Ferritin 12/14/2023 38     Iron 12/14/2023 31 (L)     Iron Saturation 12/14/2023 11 (L)     TIBC 12/14/2023 282     RBC 12/14/2023 2.69 (L)     Hemoglobin 12/14/2023 8.2 (L)     Hematocrit 12/14/2023 24.5 (L)     MCV 12/14/2023 91.1     RDW 12/14/2023 14.0     Hemoglobin  A1 12/14/2023 98.3 (H)     Hemoglobin A2 12/14/2023 1.7 (L)     Pathologist Interpretati* 12/14/2023 Normal Hgb Electrophoresis     LDH 12/14/2023 178     Haptoglobin 12/14/2023 100     Direct Raj (DARREN) 12/14/2023 NEG     WBC 12/14/2023 3.63 (L)     RBC 12/14/2023 2.72 (L)     Hemoglobin 12/14/2023 8.2 (L)     Hematocrit 12/14/2023 24.6 (L)     MCV 12/14/2023 90.4     MCH 12/14/2023 30.1     MCHC 12/14/2023 33.3     RDW 12/14/2023 14.1     Platelet Count 12/14/2023 334     MPV 12/14/2023 8.2 (L)     Neutrophils % 12/14/2023 58.6     Lymphocytes % 12/14/2023 30.9     Monocytes % 12/14/2023 6.6 (H)     Eosinophils % 12/14/2023 2.2     Basophils % 12/14/2023 1.4 (H)     Immature Granulocytes % 12/14/2023 0.3     nRBC, Auto 12/14/2023 0.0     Neutrophils, Abs 12/14/2023 2.13     Lymphocytes, Absolute 12/14/2023 1.12     Monocytes, Absolute 12/14/2023 0.24     Eosinophils, Absolute 12/14/2023 0.08     Basophils, Absolute 12/14/2023 0.05     Immature Granulocytes, A* 12/14/2023 0.01     nRBC, Absolute 12/14/2023 0.00     Diff Type 12/14/2023 Auto     Fecal Occult Blood 12/14/2023 Negative     Specimen Outdate 12/14/2023 12/17/2023 23:59     Group & Rh 12/14/2023 A POS     Indirect Raj 12/14/2023 NEG     POC Glucose 12/14/2023 108 (H)     POC Glucose 12/14/2023 115 (H)     POC Glucose 12/14/2023 163 (H)     POC Glucose 12/14/2023 146 (H)     POC Glucose 12/15/2023 74     POC Glucose 12/15/2023 82     POC Glucose 12/15/2023 96    Office Visit on 12/13/2023   Component Date Value    KS Interval  12/13/2023 119     QRS Duration 12/13/2023 76     QT/QTc 12/13/2023 316/461     PRT Axes 12/13/2023 -68/79/78     Heart Rate 12/13/2023 128     Results 12/13/2023      D-Dimer 12/13/2023 <0.27     Sodium 12/13/2023 131 (L)     Potassium 12/13/2023 4.0     Chloride 12/13/2023 96 (L)     CO2 12/13/2023 28     Anion Gap 12/13/2023 11     Glucose 12/13/2023 94     BUN 12/13/2023 14     Creatinine 12/13/2023 0.99     BUN/Creatinine Ratio 12/13/2023 14     Calcium 12/13/2023 9.2     eGFR 12/13/2023 57 (L)     ProBNP 12/13/2023 3,441 (H)     WBC 12/13/2023 3.89 (L)     RBC 12/13/2023 2.76 (L)     Hemoglobin 12/13/2023 8.3 (L)     Hematocrit 12/13/2023 24.8 (L)     MCV 12/13/2023 89.9     MCH 12/13/2023 30.1     MCHC 12/13/2023 33.5     RDW 12/13/2023 13.7     Platelet Count 12/13/2023 360     MPV 12/13/2023 8.4 (L)     Neutrophils % 12/13/2023 72.3 (H)     Lymphocytes % 12/13/2023 19.0 (L)     Monocytes % 12/13/2023 6.4 (H)     Eosinophils % 12/13/2023 1.0     Basophils % 12/13/2023 1.0     Immature Granulocytes % 12/13/2023 0.3     nRBC, Auto 12/13/2023 0.0     Neutrophils, Abs 12/13/2023 2.81     Lymphocytes, Absolute 12/13/2023 0.74 (L)     Monocytes, Absolute 12/13/2023 0.25     Eosinophils, Absolute 12/13/2023 0.04     Basophils, Absolute 12/13/2023 0.04     Immature Granulocytes, A* 12/13/2023 0.01     nRBC, Absolute 12/13/2023 0.00     Diff Type 12/13/2023 Auto        Last Imaging:  Intra-Procedure Documentation  CHARLES performed in the Invasive Lab    - See Procedure Log link below for nursing documentation    - See CHARLES order on Card Proc Tab for physician findings   Electrophysiology Procedure  Aborted invasive cardiology procedure- see Procedure Log link for details.         **Labs and x-rays personally reviewed by me    ** reviewed      Objective:        Assessment:       1. Essential (primary) hypertension  Basic Metabolic Panel      2. Vertigo  levETIRAcetam (KEPPRA) 500 MG Tab      3. Congestive heart  failure, unspecified HF chronicity, unspecified heart failure type  NT-Pro Natriuretic Peptide           Plan:         1. Essential (primary) hypertension  -     Basic Metabolic Panel; Future; Expected date: 02/10/2024    2. Vertigo  -     levETIRAcetam (KEPPRA) 500 MG Tab; Take 1 tablet (500 mg total) by mouth 2 (two) times daily.  Dispense: 120 tablet; Refill: 1    3. Congestive heart failure, unspecified HF chronicity, unspecified heart failure type  -     NT-Pro Natriuretic Peptide; Future; Expected date: 02/10/2024    Other orders  -     amLODIPine (NORVASC) 5 MG tablet; Take 1 tablet (5 mg total) by mouth once daily.  Dispense: 90 tablet; Refill: 1  -     atorvastatin (LIPITOR) 20 MG tablet; Take 1 tablet (20 mg total) by mouth once daily.  Dispense: 90 tablet; Refill: 1  -     apixaban (ELIQUIS) 5 mg Tab; TAKE ONE (1) TABLET BY MOUTH TWICE DAILY AS DIRECTED  Dispense: 60 tablet; Refill: 11  -     ferrous sulfate (FEROSUL) 325 mg (65 mg iron) Tab tablet; Take 1 tablet (325 mg total) by mouth once daily.  Dispense: 180 tablet; Refill: 1  -     meclizine (ANTIVERT) 25 MG tablet; Take 1 tablet (25 mg total) by mouth every 12 (twelve) hours as needed for Dizziness or Nausea.  Dispense: 90 tablet; Refill: 1  -     metoprolol succinate (TOPROL-XL) 25 MG 24 hr tablet; Take 0.5 tablets (12.5 mg total) by mouth 2 (two) times daily.  Dispense: 15 tablet; Refill: 1  -     omeprazole (PRILOSEC) 20 MG capsule; Take 1 capsule (20 mg total) by mouth once daily.  Dispense: 90 capsule; Refill: 1  -     pioglitazone (ACTOS) 30 MG tablet; Take 1 tablet (30 mg total) by mouth once daily.  Dispense: 90 tablet; Refill: 1  -     furosemide (LASIX) 40 MG tablet; Take 1 tablet (40 mg total) by mouth once daily.  Dispense: 90 tablet; Refill: 0  -     potassium chloride (MICRO-K) 10 MEQ CpSR; Take 1 capsule (10 mEq total) by mouth once daily.  Dispense: 90 capsule; Refill: 0

## 2024-02-12 ENCOUNTER — OFFICE VISIT (OUTPATIENT)
Dept: FAMILY MEDICINE | Facility: CLINIC | Age: 84
End: 2024-02-12
Payer: MEDICARE

## 2024-02-12 VITALS
RESPIRATION RATE: 17 BRPM | WEIGHT: 135 LBS | TEMPERATURE: 97 F | DIASTOLIC BLOOD PRESSURE: 67 MMHG | HEIGHT: 57 IN | HEART RATE: 118 BPM | SYSTOLIC BLOOD PRESSURE: 109 MMHG | OXYGEN SATURATION: 99 % | BODY MASS INDEX: 29.12 KG/M2

## 2024-02-12 DIAGNOSIS — N28.9 KIDNEY LESION, NATIVE, RIGHT: ICD-10-CM

## 2024-02-12 DIAGNOSIS — I50.9 CONGESTIVE HEART FAILURE, UNSPECIFIED HF CHRONICITY, UNSPECIFIED HEART FAILURE TYPE: ICD-10-CM

## 2024-02-12 DIAGNOSIS — R42 VERTIGO: ICD-10-CM

## 2024-02-12 DIAGNOSIS — R00.0 TACHYCARDIA: Primary | ICD-10-CM

## 2024-02-12 LAB
ANION GAP SERPL CALCULATED.3IONS-SCNC: 8 MMOL/L (ref 7–16)
BASOPHILS # BLD AUTO: 0.05 K/UL (ref 0–0.2)
BASOPHILS NFR BLD AUTO: 1 % (ref 0–1)
BUN SERPL-MCNC: 23 MG/DL (ref 7–18)
BUN/CREAT SERPL: 17 (ref 6–20)
CALCIUM SERPL-MCNC: 8.7 MG/DL (ref 8.5–10.1)
CHLORIDE SERPL-SCNC: 106 MMOL/L (ref 98–107)
CO2 SERPL-SCNC: 28 MMOL/L (ref 21–32)
CREAT SERPL-MCNC: 1.34 MG/DL (ref 0.55–1.02)
DIFFERENTIAL METHOD BLD: ABNORMAL
EGFR (NO RACE VARIABLE) (RUSH/TITUS): 40 ML/MIN/1.73M2
EKG 12-LEAD: NORMAL
EOSINOPHIL # BLD AUTO: 0.14 K/UL (ref 0–0.5)
EOSINOPHIL NFR BLD AUTO: 2.7 % (ref 1–4)
ERYTHROCYTE [DISTWIDTH] IN BLOOD BY AUTOMATED COUNT: 13.2 % (ref 11.5–14.5)
GLUCOSE SERPL-MCNC: 93 MG/DL (ref 74–106)
HCT VFR BLD AUTO: 29.5 % (ref 38–47)
HEART RATE: 117
HGB BLD-MCNC: 9.6 G/DL (ref 12–16)
IMM GRANULOCYTES # BLD AUTO: 0.01 K/UL (ref 0–0.04)
IMM GRANULOCYTES NFR BLD: 0.2 % (ref 0–0.4)
LYMPHOCYTES # BLD AUTO: 1.18 K/UL (ref 1–4.8)
LYMPHOCYTES NFR BLD AUTO: 22.4 % (ref 27–41)
Lab: NORMAL
MCH RBC QN AUTO: 30.9 PG (ref 27–31)
MCHC RBC AUTO-ENTMCNC: 32.5 G/DL (ref 32–36)
MCV RBC AUTO: 94.9 FL (ref 80–96)
MONOCYTES # BLD AUTO: 0.3 K/UL (ref 0–0.8)
MONOCYTES NFR BLD AUTO: 5.7 % (ref 2–6)
MPC BLD CALC-MCNC: 9.6 FL (ref 9.4–12.4)
NEUTROPHILS # BLD AUTO: 3.58 K/UL (ref 1.8–7.7)
NEUTROPHILS NFR BLD AUTO: 68 % (ref 53–65)
NRBC # BLD AUTO: 0 X10E3/UL
NRBC, AUTO (.00): 0 %
NT-PROBNP SERPL-MCNC: 1653 PG/ML (ref 1–450)
PLATELET # BLD AUTO: 248 K/UL (ref 150–400)
POTASSIUM SERPL-SCNC: 5 MMOL/L (ref 3.5–5.1)
PR INTERVAL: 148
PRT AXES: NORMAL
QRS DURATION: 72
QT/QTC: NORMAL
RBC # BLD AUTO: 3.11 M/UL (ref 4.2–5.4)
SODIUM SERPL-SCNC: 137 MMOL/L (ref 136–145)
VENTRICULAR RATE: NORMAL
WBC # BLD AUTO: 5.26 K/UL (ref 4.5–11)

## 2024-02-12 PROCEDURE — 80048 BASIC METABOLIC PNL TOTAL CA: CPT | Mod: ,,, | Performed by: CLINICAL MEDICAL LABORATORY

## 2024-02-12 PROCEDURE — 85025 COMPLETE CBC W/AUTO DIFF WBC: CPT | Mod: ,,, | Performed by: CLINICAL MEDICAL LABORATORY

## 2024-02-12 PROCEDURE — 93010 ELECTROCARDIOGRAM REPORT: CPT | Mod: ,,, | Performed by: INTERNAL MEDICINE

## 2024-02-12 PROCEDURE — 93005 ELECTROCARDIOGRAM TRACING: CPT | Mod: RHCUB | Performed by: INTERNAL MEDICINE

## 2024-02-12 PROCEDURE — 99214 OFFICE O/P EST MOD 30 MIN: CPT | Mod: ,,, | Performed by: INTERNAL MEDICINE

## 2024-02-12 PROCEDURE — 83880 ASSAY OF NATRIURETIC PEPTIDE: CPT | Mod: ,,, | Performed by: CLINICAL MEDICAL LABORATORY

## 2024-02-12 RX ORDER — OMEPRAZOLE 20 MG/1
20 CAPSULE, DELAYED RELEASE ORAL DAILY
Qty: 90 CAPSULE | Refills: 1 | Status: SHIPPED | OUTPATIENT
Start: 2024-02-12 | End: 2024-03-04 | Stop reason: SDUPTHER

## 2024-02-12 RX ORDER — METOPROLOL SUCCINATE 25 MG/1
12.5 TABLET, EXTENDED RELEASE ORAL 2 TIMES DAILY
Qty: 15 TABLET | Refills: 1 | Status: SHIPPED | OUTPATIENT
Start: 2024-02-12 | End: 2024-03-04 | Stop reason: SDUPTHER

## 2024-02-12 RX ORDER — ATORVASTATIN CALCIUM 20 MG/1
20 TABLET, FILM COATED ORAL DAILY
Qty: 90 TABLET | Refills: 1 | Status: SHIPPED | OUTPATIENT
Start: 2024-02-12 | End: 2024-03-04 | Stop reason: SDUPTHER

## 2024-02-12 RX ORDER — POTASSIUM CHLORIDE 750 MG/1
10 CAPSULE, EXTENDED RELEASE ORAL DAILY
Qty: 90 CAPSULE | Refills: 0 | Status: SHIPPED | OUTPATIENT
Start: 2024-02-12 | End: 2024-03-04 | Stop reason: SDUPTHER

## 2024-02-12 RX ORDER — LEVETIRACETAM 500 MG/1
500 TABLET ORAL 2 TIMES DAILY
Qty: 120 TABLET | Refills: 1 | Status: SHIPPED | OUTPATIENT
Start: 2024-02-12

## 2024-02-12 RX ORDER — AMLODIPINE BESYLATE 5 MG/1
5 TABLET ORAL DAILY
Qty: 90 TABLET | Refills: 1 | Status: SHIPPED | OUTPATIENT
Start: 2024-02-12 | End: 2024-03-04 | Stop reason: SDUPTHER

## 2024-02-12 RX ORDER — MECLIZINE HCL 25MG 25 MG/1
25 TABLET, CHEWABLE ORAL EVERY 12 HOURS PRN
Qty: 90 TABLET | Refills: 1 | Status: SHIPPED | OUTPATIENT
Start: 2024-02-12 | End: 2024-03-04 | Stop reason: SDUPTHER

## 2024-02-12 RX ORDER — FUROSEMIDE 40 MG/1
40 TABLET ORAL DAILY
Qty: 90 TABLET | Refills: 0 | Status: SHIPPED | OUTPATIENT
Start: 2024-02-12 | End: 2024-03-04 | Stop reason: SDUPTHER

## 2024-02-12 RX ORDER — FERROUS SULFATE 325(65) MG
325 TABLET ORAL DAILY
Qty: 180 TABLET | Refills: 1 | Status: SHIPPED | OUTPATIENT
Start: 2024-02-12 | End: 2024-03-04 | Stop reason: SDUPTHER

## 2024-02-12 RX ORDER — PIOGLITAZONEHYDROCHLORIDE 30 MG/1
30 TABLET ORAL DAILY
Qty: 90 TABLET | Refills: 1 | Status: SHIPPED | OUTPATIENT
Start: 2024-02-12 | End: 2024-03-04 | Stop reason: SDUPTHER

## 2024-02-13 ENCOUNTER — TELEPHONE (OUTPATIENT)
Dept: FAMILY MEDICINE | Facility: CLINIC | Age: 84
End: 2024-02-13
Payer: MEDICARE

## 2024-02-13 NOTE — TELEPHONE ENCOUNTER
----- Message from Farhad Culver MD sent at 2/13/2024  3:57 PM CST -----  Need to see in  1 week please  abnl results     1607 Pt is scheduled to come in on 02/19/24

## 2024-02-19 ENCOUNTER — APPOINTMENT (OUTPATIENT)
Dept: RADIOLOGY | Facility: CLINIC | Age: 84
End: 2024-02-19
Attending: INTERNAL MEDICINE
Payer: MEDICARE

## 2024-02-19 ENCOUNTER — OFFICE VISIT (OUTPATIENT)
Dept: FAMILY MEDICINE | Facility: CLINIC | Age: 84
End: 2024-02-19
Payer: MEDICARE

## 2024-02-19 VITALS
HEIGHT: 57 IN | SYSTOLIC BLOOD PRESSURE: 110 MMHG | RESPIRATION RATE: 17 BRPM | HEART RATE: 122 BPM | OXYGEN SATURATION: 100 % | WEIGHT: 135 LBS | TEMPERATURE: 98 F | BODY MASS INDEX: 29.12 KG/M2 | DIASTOLIC BLOOD PRESSURE: 74 MMHG

## 2024-02-19 DIAGNOSIS — R00.0 TACHYCARDIA: ICD-10-CM

## 2024-02-19 DIAGNOSIS — R00.0 TACHYCARDIA: Primary | ICD-10-CM

## 2024-02-19 PROBLEM — N28.9 KIDNEY LESION, NATIVE, RIGHT: Status: ACTIVE | Noted: 2024-02-19

## 2024-02-19 LAB
EKG 12-LEAD: NORMAL
HEART RATE: 118
Lab: NORMAL
PR INTERVAL: 123
PRT AXES: NORMAL
QRS DURATION: 79
QT/QTC: NORMAL
VENTRICULAR RATE: NORMAL

## 2024-02-19 PROCEDURE — 71046 X-RAY EXAM CHEST 2 VIEWS: CPT | Mod: TC,RHCUB | Performed by: INTERNAL MEDICINE

## 2024-02-19 PROCEDURE — 93010 ELECTROCARDIOGRAM REPORT: CPT | Mod: ,,, | Performed by: INTERNAL MEDICINE

## 2024-02-19 PROCEDURE — 99214 OFFICE O/P EST MOD 30 MIN: CPT | Mod: ,,, | Performed by: INTERNAL MEDICINE

## 2024-02-19 PROCEDURE — 93005 ELECTROCARDIOGRAM TRACING: CPT | Mod: RHCUB | Performed by: INTERNAL MEDICINE

## 2024-02-19 NOTE — PROGRESS NOTES
Pt sent to ER for increased heart rate per Dr Culver; pt heart rate reading is 118 bpm per EKG; pt states she feels fine; pt referred to [facility] for further evaluation; pt will arrive to ER POV with .

## 2024-02-19 NOTE — PROGRESS NOTES
Subjective:       Patient ID: Zbigniew Ventura is a 83 y.o. female.    Chief Complaint: Hypertension    Hypertension      .  Patient seen today patient complains of palpitations heart rate is as high as 119.  Order an EKG heart rate remains elevated.  Patient also found to have a right kidney lesion.  Of because of the heart rate is elevated as she is having palpitations going to order basic metabolic panel and CBC I am refer the patient to the emergency room patient and her  refused to go to the ER.  Will order MRI of the right kidney.      Current Medications:    Current Outpatient Medications:     amLODIPine (NORVASC) 5 MG tablet, Take 1 tablet (5 mg total) by mouth once daily., Disp: 90 tablet, Rfl: 1    apixaban (ELIQUIS) 5 mg Tab, TAKE ONE (1) TABLET BY MOUTH TWICE DAILY AS DIRECTED, Disp: 60 tablet, Rfl: 11    atorvastatin (LIPITOR) 20 MG tablet, Take 1 tablet (20 mg total) by mouth once daily., Disp: 90 tablet, Rfl: 1    ferrous sulfate (FEROSUL) 325 mg (65 mg iron) Tab tablet, Take 1 tablet (325 mg total) by mouth once daily., Disp: 180 tablet, Rfl: 1    furosemide (LASIX) 40 MG tablet, Take 1 tablet (40 mg total) by mouth once daily., Disp: 90 tablet, Rfl: 0    levETIRAcetam (KEPPRA) 500 MG Tab, Take 1 tablet (500 mg total) by mouth 2 (two) times daily., Disp: 120 tablet, Rfl: 1    meclizine (ANTIVERT) 25 MG tablet, Take 1 tablet (25 mg total) by mouth every 12 (twelve) hours as needed for Dizziness or Nausea., Disp: 90 tablet, Rfl: 1    metoprolol succinate (TOPROL-XL) 25 MG 24 hr tablet, Take 0.5 tablets (12.5 mg total) by mouth 2 (two) times daily., Disp: 15 tablet, Rfl: 1    omeprazole (PRILOSEC) 20 MG capsule, Take 1 capsule (20 mg total) by mouth once daily., Disp: 90 capsule, Rfl: 1    pioglitazone (ACTOS) 30 MG tablet, Take 1 tablet (30 mg total) by mouth once daily., Disp: 90 tablet, Rfl: 1    potassium chloride (MICRO-K) 10 MEQ CpSR, Take 1 capsule (10 mEq total) by mouth once daily., Disp: 90  "capsule, Rfl: 0           Review of Systems             Vitals:    02/12/24 1022 02/12/24 1023   BP: 109/67    BP Location: Left arm    Patient Position: Sitting    BP Method: Large (Automatic)    Pulse: (!) 119 (!) 118   Resp: 17    Temp: 97.1 °F (36.2 °C)    TempSrc: Temporal    SpO2: 99%    Weight: 61.2 kg (135 lb)    Height: 4' 9" (1.448 m)         Physical Exam  Vitals and nursing note reviewed.   Constitutional:       Appearance: Normal appearance.   Cardiovascular:      Rate and Rhythm: Normal rate and regular rhythm.      Pulses: Normal pulses.      Heart sounds: Normal heart sounds.   Pulmonary:      Effort: Pulmonary effort is normal.      Breath sounds: Normal breath sounds.   Abdominal:      General: Abdomen is flat. Bowel sounds are normal.      Palpations: Abdomen is soft.   Musculoskeletal:         General: Normal range of motion.   Skin:     General: Skin is warm and dry.   Neurological:      General: No focal deficit present.      Mental Status: She is alert and oriented to person, place, and time. Mental status is at baseline.           Last Labs:     Office Visit on 02/12/2024   Component Date Value    ProBNP 02/12/2024 1,653 (H)     Sodium 02/12/2024 137     Potassium 02/12/2024 5.0     Chloride 02/12/2024 106     CO2 02/12/2024 28     Anion Gap 02/12/2024 8     Glucose 02/12/2024 93     BUN 02/12/2024 23 (H)     Creatinine 02/12/2024 1.34 (H)     BUN/Creatinine Ratio 02/12/2024 17     Calcium 02/12/2024 8.7     eGFR 02/12/2024 40 (L)     WBC 02/12/2024 5.26     RBC 02/12/2024 3.11 (L)     Hemoglobin 02/12/2024 9.6 (L)     Hematocrit 02/12/2024 29.5 (L)     MCV 02/12/2024 94.9     MCH 02/12/2024 30.9     MCHC 02/12/2024 32.5     RDW 02/12/2024 13.2     Platelet Count 02/12/2024 248     MPV 02/12/2024 9.6     Neutrophils % 02/12/2024 68.0 (H)     Lymphocytes % 02/12/2024 22.4 (L)     Monocytes % 02/12/2024 5.7     Eosinophils % 02/12/2024 2.7     Basophils % 02/12/2024 1.0     Immature " Granulocytes % 02/12/2024 0.2     nRBC, Auto 02/12/2024 0.0     Neutrophils, Abs 02/12/2024 3.58     Lymphocytes, Absolute 02/12/2024 1.18     Monocytes, Absolute 02/12/2024 0.30     Eosinophils, Absolute 02/12/2024 0.14     Basophils, Absolute 02/12/2024 0.05     Immature Granulocytes, A* 02/12/2024 0.01     nRBC, Absolute 02/12/2024 0.00     Diff Type 02/12/2024 Auto     NC Interval 02/12/2024 148     QRS Duration 02/12/2024 72     QT/QTc 02/12/2024 313/437     PRT Axes 02/12/2024 103/71/61     Heart Rate 02/12/2024 117     Results 02/12/2024         Last Imaging:  Intra-Procedure Documentation  CHARLES performed in the Invasive Lab    - See Procedure Log link below for nursing documentation    - See CHARLES order on Card Proc Tab for physician findings   Electrophysiology Procedure  Aborted invasive cardiology procedure- see Procedure Log link for details.         **Labs and x-rays personally reviewed by me    ** reviewed      Objective:        Assessment:       1. Tachycardia  Basic Metabolic Panel    CBC Auto Differential    Basic Metabolic Panel    CBC Auto Differential    POCT EKG 12-LEAD (Manually Resulted by Ordering Provider)      2. Vertigo  levETIRAcetam (KEPPRA) 500 MG Tab      3. Congestive heart failure, unspecified HF chronicity, unspecified heart failure type  NT-Pro Natriuretic Peptide      4. Kidney lesion, native, right  MRI Abdomen Without Contrast           Plan:         1. Tachycardia  -     Basic Metabolic Panel; Future; Expected date: 02/12/2024  -     CBC Auto Differential; Future; Expected date: 02/12/2024  -     POCT EKG 12-LEAD (Manually Resulted by Ordering Provider)    2. Vertigo  -     levETIRAcetam (KEPPRA) 500 MG Tab; Take 1 tablet (500 mg total) by mouth 2 (two) times daily.  Dispense: 120 tablet; Refill: 1    3. Congestive heart failure, unspecified HF chronicity, unspecified heart failure type  -     NT-Pro Natriuretic Peptide    4. Kidney lesion, native, right  -     MRI Abdomen  Without Contrast; Future; Expected date: 02/12/2024    Other orders  -     amLODIPine (NORVASC) 5 MG tablet; Take 1 tablet (5 mg total) by mouth once daily.  Dispense: 90 tablet; Refill: 1  -     apixaban (ELIQUIS) 5 mg Tab; TAKE ONE (1) TABLET BY MOUTH TWICE DAILY AS DIRECTED  Dispense: 60 tablet; Refill: 11  -     atorvastatin (LIPITOR) 20 MG tablet; Take 1 tablet (20 mg total) by mouth once daily.  Dispense: 90 tablet; Refill: 1  -     ferrous sulfate (FEROSUL) 325 mg (65 mg iron) Tab tablet; Take 1 tablet (325 mg total) by mouth once daily.  Dispense: 180 tablet; Refill: 1  -     furosemide (LASIX) 40 MG tablet; Take 1 tablet (40 mg total) by mouth once daily.  Dispense: 90 tablet; Refill: 0  -     meclizine (ANTIVERT) 25 MG tablet; Take 1 tablet (25 mg total) by mouth every 12 (twelve) hours as needed for Dizziness or Nausea.  Dispense: 90 tablet; Refill: 1  -     metoprolol succinate (TOPROL-XL) 25 MG 24 hr tablet; Take 0.5 tablets (12.5 mg total) by mouth 2 (two) times daily.  Dispense: 15 tablet; Refill: 1  -     omeprazole (PRILOSEC) 20 MG capsule; Take 1 capsule (20 mg total) by mouth once daily.  Dispense: 90 capsule; Refill: 1  -     pioglitazone (ACTOS) 30 MG tablet; Take 1 tablet (30 mg total) by mouth once daily.  Dispense: 90 tablet; Refill: 1  -     potassium chloride (MICRO-K) 10 MEQ CpSR; Take 1 capsule (10 mEq total) by mouth once daily.  Dispense: 90 capsule; Refill: 0

## 2024-02-27 ENCOUNTER — OFFICE VISIT (OUTPATIENT)
Dept: NEUROLOGY | Facility: CLINIC | Age: 84
End: 2024-02-27
Payer: MEDICARE

## 2024-02-27 VITALS
HEIGHT: 57 IN | SYSTOLIC BLOOD PRESSURE: 108 MMHG | DIASTOLIC BLOOD PRESSURE: 68 MMHG | WEIGHT: 138.19 LBS | BODY MASS INDEX: 29.81 KG/M2 | OXYGEN SATURATION: 95 % | HEART RATE: 92 BPM

## 2024-02-27 DIAGNOSIS — R42 VERTIGO: ICD-10-CM

## 2024-02-27 DIAGNOSIS — I63.9 CEREBROVASCULAR ACCIDENT (CVA), UNSPECIFIED MECHANISM: ICD-10-CM

## 2024-02-27 DIAGNOSIS — G40.909 NONINTRACTABLE EPILEPSY WITHOUT STATUS EPILEPTICUS, UNSPECIFIED EPILEPSY TYPE: Primary | ICD-10-CM

## 2024-02-27 PROCEDURE — 99214 OFFICE O/P EST MOD 30 MIN: CPT | Mod: PBBFAC | Performed by: NURSE PRACTITIONER

## 2024-02-27 PROCEDURE — 99213 OFFICE O/P EST LOW 20 MIN: CPT | Mod: S$PBB,,, | Performed by: NURSE PRACTITIONER

## 2024-02-27 RX ORDER — NAPROXEN 375 MG/1
375 TABLET ORAL DAILY PRN
Qty: 30 TABLET | Refills: 0 | Status: SHIPPED | OUTPATIENT
Start: 2024-02-27 | End: 2024-03-04 | Stop reason: SDUPTHER

## 2024-02-27 NOTE — PROGRESS NOTES
Subjective:       Patient ID: Zbigniew Ventura is a 83 y.o. female     Chief Complaint:    Chief Complaint   Patient presents with    Follow-up     Pt needs refills on Naproxen and Iron.           Allergies:  Patient has no known allergies.    Current Medications:    Outpatient Encounter Medications as of 2/27/2024   Medication Sig Dispense Refill    amLODIPine (NORVASC) 5 MG tablet Take 1 tablet (5 mg total) by mouth once daily. 90 tablet 1    apixaban (ELIQUIS) 5 mg Tab TAKE ONE (1) TABLET BY MOUTH TWICE DAILY AS DIRECTED 60 tablet 11    atorvastatin (LIPITOR) 20 MG tablet Take 1 tablet (20 mg total) by mouth once daily. 90 tablet 1    ferrous sulfate (FEROSUL) 325 mg (65 mg iron) Tab tablet Take 1 tablet (325 mg total) by mouth once daily. 180 tablet 1    furosemide (LASIX) 40 MG tablet Take 1 tablet (40 mg total) by mouth once daily. 90 tablet 0    levETIRAcetam (KEPPRA) 500 MG Tab Take 1 tablet (500 mg total) by mouth 2 (two) times daily. 120 tablet 1    meclizine (ANTIVERT) 25 MG tablet Take 1 tablet (25 mg total) by mouth every 12 (twelve) hours as needed for Dizziness or Nausea. 90 tablet 1    metoprolol succinate (TOPROL-XL) 25 MG 24 hr tablet Take 0.5 tablets (12.5 mg total) by mouth 2 (two) times daily. 15 tablet 1    omeprazole (PRILOSEC) 20 MG capsule Take 1 capsule (20 mg total) by mouth once daily. 90 capsule 1    pioglitazone (ACTOS) 30 MG tablet Take 1 tablet (30 mg total) by mouth once daily. 90 tablet 1    potassium chloride (MICRO-K) 10 MEQ CpSR Take 1 capsule (10 mEq total) by mouth once daily. 90 capsule 0    naproxen (EC-NAPROSYN) 375 MG TbEC EC tablet Take 1 tablet (375 mg total) by mouth daily as needed (knee pain). 30 tablet 0    [DISCONTINUED] amLODIPine (NORVASC) 5 MG tablet Take 1 tablet (5 mg total) by mouth once daily. 90 tablet 1    [DISCONTINUED] apixaban (ELIQUIS) 5 mg Tab TAKE ONE (1) TABLET BY MOUTH TWICE DAILY AS DIRECTED 60 tablet 11    [DISCONTINUED] atorvastatin (LIPITOR) 20 MG  tablet Take 1 tablet (20 mg total) by mouth once daily. 90 tablet 1    [DISCONTINUED] ferrous sulfate (FEROSUL) 325 mg (65 mg iron) Tab tablet Take 1 tablet (325 mg total) by mouth once daily. 180 tablet 1    [DISCONTINUED] furosemide (LASIX) 40 MG tablet Take 1 tablet (40 mg total) by mouth once daily. 90 tablet 0    [DISCONTINUED] levETIRAcetam (KEPPRA) 500 MG Tab Take 1 tablet (500 mg total) by mouth 2 (two) times daily. 120 tablet 1    [DISCONTINUED] meclizine (ANTIVERT) 25 MG tablet Take 1 tablet (25 mg total) by mouth every 12 (twelve) hours as needed for Dizziness or Nausea. 90 tablet 1    [DISCONTINUED] metoprolol succinate (TOPROL-XL) 25 MG 24 hr tablet Take 0.5 tablets (12.5 mg total) by mouth 2 (two) times daily. 15 tablet 1    [DISCONTINUED] omeprazole (PRILOSEC) 20 MG capsule Take 1 capsule (20 mg total) by mouth once daily. 90 capsule 1    [DISCONTINUED] pioglitazone (ACTOS) 30 MG tablet Take 1 tablet (30 mg total) by mouth once daily. 90 tablet 1    [DISCONTINUED] potassium chloride (MICRO-K) 10 MEQ CpSR Take 1 capsule (10 mEq total) by mouth once daily. 90 capsule 0     No facility-administered encounter medications on file as of 2/27/2024.       History of Present Illness  This Is a 83-year-old past history of diabetes and right hemispheric stroke and seizure.    Her family in room assists with communication, her .    They deny any seizures since her last visit with us and no new CVA symptoms.  She is on triple therapy including ASA 81mg as well as keppra 500mg BID which is filled by her primary care and denies any side effects.    She does have residual left-sided weakness from stroke but it is minimal. In     They do report that she has occasional episodes of dizziness, this since her prior CVA.  Her  has let her use his prescribed meclizine which worked very well.  She is being followed by cardiology, prior seen by Dr. Alcazar.  Noted more recently with visit at the emergency  department for possible atrial flutter.  They are encouraged to keep follow-up with cardiology regarding this           Review of Systems  Review of Systems   Constitutional:  Negative for diaphoresis and fever.   HENT:  Negative for congestion, hearing loss and tinnitus.    Eyes:  Negative for blurred vision, double vision, photophobia, discharge and redness.   Respiratory:  Negative for cough and shortness of breath.    Cardiovascular:  Negative for chest pain.   Gastrointestinal:  Negative for abdominal pain, nausea and vomiting.   Musculoskeletal:  Negative for back pain, joint pain, myalgias and neck pain.   Skin:  Negative for itching and rash.   Neurological:  Positive for dizziness and seizures. Negative for tremors, sensory change, speech change, focal weakness, loss of consciousness, weakness and headaches.   Psychiatric/Behavioral:  Negative for depression, hallucinations and memory loss. The patient does not have insomnia.    All other systems reviewed and are negative.     Objective:     NEUROLOGICAL EXAMINATION:     MENTAL STATUS   Oriented to person, place, and time.   Attention: normal. Concentration: normal.   Speech: speech is normal   Level of consciousness: alert  Knowledge: good and consistent with education.   Normal comprehension.     CRANIAL NERVES     CN II   Visual fields full to confrontation.   Visual acuity: normal  Right visual field deficit: none  Left visual field deficit: none     CN III, IV, VI   Pupils are equal, round, and reactive to light.  Extraocular motions are normal.   Right pupil: Size: 3 mm. Shape: regular. Reactivity: brisk. Consensual response: intact. Accommodation: intact.   Left pupil: Size: 3 mm. Shape: regular. Reactivity: brisk. Consensual response: intact. Accommodation: intact.   CN III: no CN III palsy  CN VI: no CN VI palsy  Nystagmus: none   Diplopia: none  Upgaze: normal  Downgaze: normal  Conjugate gaze: present  Vestibulo-ocular reflex: present    CN V    Facial sensation intact.   Right facial sensation deficit: none  Left facial sensation deficit: none  Right corneal reflex: normal  Left corneal reflex: normal    CN VII   Facial expression full, symmetric.   Right facial weakness: none  Left facial weakness: none  Right taste: normal  Left taste: normal    CN VIII   CN VIII normal.   Hearing: intact    CN IX, X   CN IX normal.   CN X normal.   Palate: symmetric    CN XI   CN XI normal.   Right sternocleidomastoid strength: normal  Left sternocleidomastoid strength: normal  Right trapezius strength: normal  Left trapezius strength: normal    CN XII   CN XII normal.   Tongue: not atrophic  Fasciculations: absent  Tongue deviation: none    MOTOR EXAM   Muscle bulk: normal  Overall muscle tone: normal  Right arm tone: normal  Left arm tone: normal  Right arm pronator drift: absent  Left arm pronator drift: absent  Right leg tone: normal  Left leg tone: normal    Strength   Right neck flexion: 5/5  Left neck flexion: 5/5  Right neck extension: 5/5  Left neck extension: 5/5  Right deltoid: 5/5  Left deltoid: 5/5  Right biceps: 5/5  Left biceps: 5/5  Right triceps: 5/5  Left triceps: 5/5  Right wrist flexion: 5/5  Left wrist flexion: 5/5  Right wrist extension: 5/5  Left wrist extension: 5/5  Right interossei: 5/5  Left interossei: 5/5  Right iliopsoas: 5/5  Left iliopsoas: 5/5  Right quadriceps: 5/5  Left quadriceps: 5/5  Right hamstrin/5  Left hamstrin/5  Right anterior tibial: 5/5  Left anterior tibial: 5/5  Right posterior tibial: 5/5  Left posterior tibial: 5/5  Right gastroc: 5/5  Left gastroc: 5/5    REFLEXES     Reflexes   Right brachioradialis: 2+  Left brachioradialis: 2+  Right biceps: 2+  Left biceps: 2+  Right triceps: 2+  Left triceps: 2+  Right patellar: 2+  Left patellar: 2+  Right achilles: 2+  Left achilles: 2+  Right plantar: normal  Left plantar: normal  Right Calvin: absent  Left Calvin: absent  Right ankle clonus: absent  Left ankle  clonus: absent  Right pendular knee jerk: absent  Left pendular knee jerk: absent    SENSORY EXAM   Light touch normal.   Right arm light touch: normal  Left arm light touch: normal  Right leg light touch: normal  Left leg light touch: normal  Vibration normal.   Right arm vibration: normal  Left arm vibration: normal  Right leg vibration: normal  Left leg vibration: normal  Proprioception normal.   Right arm proprioception: normal  Left arm proprioception: normal  Right leg proprioception: normal  Left leg proprioception: normal  Pinprick normal.   Right arm pinprick: normal  Left arm pinprick: normal  Right leg pinprick: normal  Left leg pinprick: normal  Graphesthesia: normal  Romberg: negative  Stereognosis: normal    GAIT AND COORDINATION      Coordination   Finger to nose coordination: normal  Heel to shin coordination: normal  Tandem walking coordination: abnormal    Tremor   Resting tremor: absent  Intention tremor: absent  Action tremor: absent       Using cane to assist with ambulation        Physical Exam  Vitals and nursing note reviewed.   Constitutional:       Appearance: Normal appearance.   HENT:      Head: Normocephalic.   Eyes:      Extraocular Movements: Extraocular movements intact and EOM normal.      Pupils: Pupils are equal, round, and reactive to light.   Cardiovascular:      Rate and Rhythm: Normal rate and regular rhythm.   Pulmonary:      Effort: Pulmonary effort is normal.      Breath sounds: Normal breath sounds.   Musculoskeletal:         General: No swelling or tenderness. Normal range of motion.      Cervical back: Normal range of motion and neck supple.      Right lower leg: No edema.      Left lower leg: No edema.   Skin:     General: Skin is warm and dry.      Coloration: Skin is not jaundiced.      Findings: No rash.   Neurological:      General: No focal deficit present.      Mental Status: She is alert and oriented to person, place, and time.      GCS: GCS eye subscore is 4. GCS  verbal subscore is 5. GCS motor subscore is 6.      Cranial Nerves: No cranial nerve deficit.      Sensory: No sensory deficit.      Motor: Motor function is intact. No weakness.      Coordination: Coordination is intact. Coordination normal. Finger-Nose-Finger Test, Heel to Shin Test and Romberg Test normal.      Gait: Gait abnormal and tandem walk abnormal.      Deep Tendon Reflexes: Reflexes normal.      Reflex Scores:       Tricep reflexes are 2+ on the right side and 2+ on the left side.       Bicep reflexes are 2+ on the right side and 2+ on the left side.       Brachioradialis reflexes are 2+ on the right side and 2+ on the left side.       Patellar reflexes are 2+ on the right side and 2+ on the left side.       Achilles reflexes are 2+ on the right side and 2+ on the left side.  Psychiatric:         Mood and Affect: Mood normal.         Speech: Speech normal.         Behavior: Behavior normal.          Assessment:     Problem List Items Addressed This Visit          Neuro    Epilepsy, unspecified, not intractable, without status epilepticus - Primary    Overview     followed by Jj Goodrich NP, [facility] neurology         Stroke       ENT    Vertigo                  Primary Diagnosis and ICD10  Nonintractable epilepsy without status epilepticus, unspecified epilepsy type [G40.909]    Plan:     There are no Patient Instructions on file for this visit.    There are no discontinued medications.            Requested Prescriptions     Signed Prescriptions Disp Refills    naproxen (EC-NAPROSYN) 375 MG TbEC EC tablet 30 tablet 0     Sig: Take 1 tablet (375 mg total) by mouth daily as needed (knee pain).       No orders of the defined types were placed in this encounter.

## 2024-02-28 NOTE — PROGRESS NOTES
Subjective:       Patient ID: Zbigniew Ventura is a 83 y.o. female.    Chief Complaint: Tachycardia (Follow up )    HPI  .  Patient presents with palpitations heart rate is high as 122 patient also complains of worsening dyspnea.  Highly recommend that the patient's emergency room potential hospitalization has been discussed with the patient and her .    Current Medications:    Current Outpatient Medications:     amLODIPine (NORVASC) 5 MG tablet, Take 1 tablet (5 mg total) by mouth once daily., Disp: 90 tablet, Rfl: 1    apixaban (ELIQUIS) 5 mg Tab, TAKE ONE (1) TABLET BY MOUTH TWICE DAILY AS DIRECTED, Disp: 60 tablet, Rfl: 11    atorvastatin (LIPITOR) 20 MG tablet, Take 1 tablet (20 mg total) by mouth once daily., Disp: 90 tablet, Rfl: 1    ferrous sulfate (FEROSUL) 325 mg (65 mg iron) Tab tablet, Take 1 tablet (325 mg total) by mouth once daily., Disp: 180 tablet, Rfl: 1    furosemide (LASIX) 40 MG tablet, Take 1 tablet (40 mg total) by mouth once daily., Disp: 90 tablet, Rfl: 0    levETIRAcetam (KEPPRA) 500 MG Tab, Take 1 tablet (500 mg total) by mouth 2 (two) times daily., Disp: 120 tablet, Rfl: 1    meclizine (ANTIVERT) 25 MG tablet, Take 1 tablet (25 mg total) by mouth every 12 (twelve) hours as needed for Dizziness or Nausea., Disp: 90 tablet, Rfl: 1    metoprolol succinate (TOPROL-XL) 25 MG 24 hr tablet, Take 0.5 tablets (12.5 mg total) by mouth 2 (two) times daily., Disp: 15 tablet, Rfl: 1    omeprazole (PRILOSEC) 20 MG capsule, Take 1 capsule (20 mg total) by mouth once daily., Disp: 90 capsule, Rfl: 1    pioglitazone (ACTOS) 30 MG tablet, Take 1 tablet (30 mg total) by mouth once daily., Disp: 90 tablet, Rfl: 1    potassium chloride (MICRO-K) 10 MEQ CpSR, Take 1 capsule (10 mEq total) by mouth once daily., Disp: 90 capsule, Rfl: 0    naproxen (EC-NAPROSYN) 375 MG TbEC EC tablet, Take 1 tablet (375 mg total) by mouth daily as needed (knee pain)., Disp: 30 tablet, Rfl: 0           Review of Systems           "   Vitals:    02/19/24 0957 02/19/24 0958   BP: 110/74    BP Location: Left arm    Patient Position: Sitting    BP Method: Large (Automatic)    Pulse: (!) 120 (!) 122   Resp: 17    Temp: 97.8 °F (36.6 °C)    TempSrc: Temporal    SpO2: 100%    Weight: 61.2 kg (135 lb)    Height: 4' 9" (1.448 m)         Physical Exam  Vitals and nursing note reviewed.   Constitutional:       Appearance: Normal appearance.   Cardiovascular:      Rate and Rhythm: Normal rate and regular rhythm.      Pulses: Normal pulses.      Heart sounds: Normal heart sounds.   Pulmonary:      Effort: Pulmonary effort is normal.      Breath sounds: Normal breath sounds.   Abdominal:      General: Abdomen is flat. Bowel sounds are normal.      Palpations: Abdomen is soft.   Musculoskeletal:         General: Normal range of motion.   Skin:     General: Skin is warm and dry.   Neurological:      General: No focal deficit present.      Mental Status: She is alert and oriented to person, place, and time. Mental status is at baseline.           Last Labs:     Office Visit on 02/19/2024   Component Date Value    IN Interval 02/19/2024 123     QRS Duration 02/19/2024 79     QT/QTc 02/19/2024 311/437     PRT Axes 02/19/2024 -88/65/45     Heart Rate 02/19/2024 118     Results 02/19/2024     Office Visit on 02/12/2024   Component Date Value    ProBNP 02/12/2024 1,653 (H)     Sodium 02/12/2024 137     Potassium 02/12/2024 5.0     Chloride 02/12/2024 106     CO2 02/12/2024 28     Anion Gap 02/12/2024 8     Glucose 02/12/2024 93     BUN 02/12/2024 23 (H)     Creatinine 02/12/2024 1.34 (H)     BUN/Creatinine Ratio 02/12/2024 17     Calcium 02/12/2024 8.7     eGFR 02/12/2024 40 (L)     WBC 02/12/2024 5.26     RBC 02/12/2024 3.11 (L)     Hemoglobin 02/12/2024 9.6 (L)     Hematocrit 02/12/2024 29.5 (L)     MCV 02/12/2024 94.9     MCH 02/12/2024 30.9     MCHC 02/12/2024 32.5     RDW 02/12/2024 13.2     Platelet Count 02/12/2024 248     MPV 02/12/2024 9.6     Neutrophils " % 02/12/2024 68.0 (H)     Lymphocytes % 02/12/2024 22.4 (L)     Monocytes % 02/12/2024 5.7     Eosinophils % 02/12/2024 2.7     Basophils % 02/12/2024 1.0     Immature Granulocytes % 02/12/2024 0.2     nRBC, Auto 02/12/2024 0.0     Neutrophils, Abs 02/12/2024 3.58     Lymphocytes, Absolute 02/12/2024 1.18     Monocytes, Absolute 02/12/2024 0.30     Eosinophils, Absolute 02/12/2024 0.14     Basophils, Absolute 02/12/2024 0.05     Immature Granulocytes, A* 02/12/2024 0.01     nRBC, Absolute 02/12/2024 0.00     Diff Type 02/12/2024 Auto     CA Interval 02/12/2024 148     QRS Duration 02/12/2024 72     QT/QTc 02/12/2024 313/437     PRT Axes 02/12/2024 103/71/61     Heart Rate 02/12/2024 117     Results 02/12/2024         Last Imaging:  X-Ray Chest PA And Lateral  Narrative: EXAMINATION:  XR CHEST PA AND LATERAL    CLINICAL HISTORY:  Tachycardia, unspecified    TECHNIQUE:  PA and lateral views of the chest were performed.    COMPARISON:  12/13/2023    FINDINGS:  Cardiac silhouette borderline in size as before.  Aortic arch calcifications.  Lungs clear.  No pneumothorax.  No pleural effusion.  Impression: No acute findings.    Electronically signed by: Olivier Puri  Date:    02/19/2024  Time:    10:42         **Labs and x-rays personally reviewed by me    ** reviewed      Objective:        Assessment:       1. Tachycardia  X-Ray Chest PA And Lateral    POCT EKG 12-LEAD (Manually Resulted by Ordering Provider)           Plan:         1. Tachycardia  -     X-Ray Chest PA And Lateral; Future; Expected date: 02/19/2024  -     POCT EKG 12-LEAD (Manually Resulted by Ordering Provider)

## 2024-03-04 ENCOUNTER — OFFICE VISIT (OUTPATIENT)
Dept: FAMILY MEDICINE | Facility: CLINIC | Age: 84
End: 2024-03-04
Payer: MEDICARE

## 2024-03-04 VITALS
WEIGHT: 138 LBS | HEIGHT: 57 IN | SYSTOLIC BLOOD PRESSURE: 119 MMHG | DIASTOLIC BLOOD PRESSURE: 71 MMHG | OXYGEN SATURATION: 99 % | TEMPERATURE: 98 F | RESPIRATION RATE: 18 BRPM | HEART RATE: 124 BPM | BODY MASS INDEX: 29.77 KG/M2

## 2024-03-04 DIAGNOSIS — R00.0 TACHYCARDIA: Primary | ICD-10-CM

## 2024-03-04 PROCEDURE — 99214 OFFICE O/P EST MOD 30 MIN: CPT | Mod: ,,, | Performed by: INTERNAL MEDICINE

## 2024-03-05 RX ORDER — FUROSEMIDE 40 MG/1
40 TABLET ORAL DAILY
Qty: 90 TABLET | Refills: 0 | Status: SHIPPED | OUTPATIENT
Start: 2024-03-05

## 2024-03-05 RX ORDER — POTASSIUM CHLORIDE 750 MG/1
10 CAPSULE, EXTENDED RELEASE ORAL DAILY
Qty: 90 CAPSULE | Refills: 0 | Status: SHIPPED | OUTPATIENT
Start: 2024-03-05

## 2024-03-05 RX ORDER — FERROUS SULFATE 325(65) MG
325 TABLET ORAL DAILY
Qty: 180 TABLET | Refills: 1 | Status: SHIPPED | OUTPATIENT
Start: 2024-03-05

## 2024-03-05 RX ORDER — OMEPRAZOLE 20 MG/1
20 CAPSULE, DELAYED RELEASE ORAL DAILY
Qty: 90 CAPSULE | Refills: 1 | Status: SHIPPED | OUTPATIENT
Start: 2024-03-05

## 2024-03-05 RX ORDER — AMLODIPINE BESYLATE 5 MG/1
5 TABLET ORAL DAILY
Qty: 90 TABLET | Refills: 1 | Status: SHIPPED | OUTPATIENT
Start: 2024-03-05

## 2024-03-05 RX ORDER — ATORVASTATIN CALCIUM 20 MG/1
20 TABLET, FILM COATED ORAL DAILY
Qty: 90 TABLET | Refills: 1 | Status: SHIPPED | OUTPATIENT
Start: 2024-03-05

## 2024-03-05 RX ORDER — PIOGLITAZONEHYDROCHLORIDE 30 MG/1
30 TABLET ORAL DAILY
Qty: 90 TABLET | Refills: 1 | Status: SHIPPED | OUTPATIENT
Start: 2024-03-05

## 2024-03-05 RX ORDER — NAPROXEN 375 MG/1
375 TABLET ORAL DAILY PRN
Qty: 30 TABLET | Refills: 0 | Status: SHIPPED | OUTPATIENT
Start: 2024-03-05 | End: 2024-05-02

## 2024-03-05 RX ORDER — MECLIZINE HCL 25MG 25 MG/1
25 TABLET, CHEWABLE ORAL EVERY 12 HOURS PRN
Qty: 90 TABLET | Refills: 1 | Status: SHIPPED | OUTPATIENT
Start: 2024-03-05

## 2024-03-05 RX ORDER — METOPROLOL SUCCINATE 25 MG/1
12.5 TABLET, EXTENDED RELEASE ORAL 2 TIMES DAILY
Qty: 15 TABLET | Refills: 1 | Status: SHIPPED | OUTPATIENT
Start: 2024-03-05 | End: 2024-04-17 | Stop reason: DRUGHIGH

## 2024-03-05 NOTE — PROGRESS NOTES
Subjective:       Patient ID: Zbigniew Ventura is a 83 y.o. female.    Chief Complaint: Follow-up (Tachycardia )    Follow-up    Patient voiced no complaints today.  Heart rate is 124 this is the 3rd visit with tachycardia I suggested the emergency room again patient refused ER treatment.  Patient has a history of hypertension GERD diabetes mellitus going to refill the appropriate meds see below.  For the persistent tachycardia since she is refusing ER going to refer the patient to Cardiology  .    Current Medications:    Current Outpatient Medications:     levETIRAcetam (KEPPRA) 500 MG Tab, Take 1 tablet (500 mg total) by mouth 2 (two) times daily., Disp: 120 tablet, Rfl: 1    amLODIPine (NORVASC) 5 MG tablet, Take 1 tablet (5 mg total) by mouth once daily., Disp: 90 tablet, Rfl: 1    apixaban (ELIQUIS) 5 mg Tab, TAKE ONE (1) TABLET BY MOUTH TWICE DAILY AS DIRECTED, Disp: 60 tablet, Rfl: 11    atorvastatin (LIPITOR) 20 MG tablet, Take 1 tablet (20 mg total) by mouth once daily., Disp: 90 tablet, Rfl: 1    ferrous sulfate (FEROSUL) 325 mg (65 mg iron) Tab tablet, Take 1 tablet (325 mg total) by mouth once daily., Disp: 180 tablet, Rfl: 1    furosemide (LASIX) 40 MG tablet, Take 1 tablet (40 mg total) by mouth once daily., Disp: 90 tablet, Rfl: 0    meclizine (ANTIVERT) 25 MG tablet, Take 1 tablet (25 mg total) by mouth every 12 (twelve) hours as needed for Dizziness or Nausea., Disp: 90 tablet, Rfl: 1    metoprolol succinate (TOPROL-XL) 25 MG 24 hr tablet, Take 0.5 tablets (12.5 mg total) by mouth 2 (two) times daily., Disp: 15 tablet, Rfl: 1    naproxen (EC-NAPROSYN) 375 MG TbEC EC tablet, Take 1 tablet (375 mg total) by mouth daily as needed (knee pain)., Disp: 30 tablet, Rfl: 0    omeprazole (PRILOSEC) 20 MG capsule, Take 1 capsule (20 mg total) by mouth once daily., Disp: 90 capsule, Rfl: 1    pioglitazone (ACTOS) 30 MG tablet, Take 1 tablet (30 mg total) by mouth once daily., Disp: 90 tablet, Rfl: 1    potassium  "chloride (MICRO-K) 10 MEQ CpSR, Take 1 capsule (10 mEq total) by mouth once daily., Disp: 90 capsule, Rfl: 0           Review of Systems             Vitals:    03/04/24 1012   BP: 119/71   BP Location: Left arm   Patient Position: Sitting   BP Method: Large (Automatic)   Pulse: (!) 124   Resp: 18   Temp: 97.7 °F (36.5 °C)   TempSrc: Temporal   SpO2: 99%   Weight: 62.6 kg (138 lb)   Height: 4' 9" (1.448 m)        Physical Exam  Vitals and nursing note reviewed.   Constitutional:       Appearance: Normal appearance.   Cardiovascular:      Rate and Rhythm: Regular rhythm. Tachycardia present.      Pulses: Normal pulses.      Heart sounds: Normal heart sounds.   Pulmonary:      Effort: Pulmonary effort is normal.      Breath sounds: Normal breath sounds.   Abdominal:      General: Abdomen is flat. Bowel sounds are normal.      Palpations: Abdomen is soft.   Musculoskeletal:         General: Normal range of motion.   Skin:     General: Skin is warm and dry.   Neurological:      General: No focal deficit present.      Mental Status: She is alert and oriented to person, place, and time. Mental status is at baseline.           Last Labs:     Office Visit on 02/19/2024   Component Date Value    [place] Interval 02/19/2024 123     QRS Duration 02/19/2024 79     QT/QTc 02/19/2024 311/437     PRT Axes 02/19/2024 -88/65/45     Heart Rate 02/19/2024 118     Results 02/19/2024     Office Visit on 02/12/2024   Component Date Value    ProBNP 02/12/2024 1,653 (H)     Sodium 02/12/2024 137     Potassium 02/12/2024 5.0     Chloride 02/12/2024 106     CO2 02/12/2024 28     Anion Gap 02/12/2024 8     Glucose 02/12/2024 93     BUN 02/12/2024 23 (H)     Creatinine 02/12/2024 1.34 (H)     BUN/Creatinine Ratio 02/12/2024 17     Calcium 02/12/2024 8.7     eGFR 02/12/2024 40 (L)     WBC 02/12/2024 5.26     RBC 02/12/2024 3.11 (L)     Hemoglobin 02/12/2024 9.6 (L)     Hematocrit 02/12/2024 29.5 (L)     MCV 02/12/2024 94.9     MCH 02/12/2024 30.9     " MCHC 02/12/2024 32.5     RDW 02/12/2024 13.2     Platelet Count 02/12/2024 248     MPV 02/12/2024 9.6     Neutrophils % 02/12/2024 68.0 (H)     Lymphocytes % 02/12/2024 22.4 (L)     Monocytes % 02/12/2024 5.7     Eosinophils % 02/12/2024 2.7     Basophils % 02/12/2024 1.0     Immature Granulocytes % 02/12/2024 0.2     nRBC, Auto 02/12/2024 0.0     Neutrophils, Abs 02/12/2024 3.58     Lymphocytes, Absolute 02/12/2024 1.18     Monocytes, Absolute 02/12/2024 0.30     Eosinophils, Absolute 02/12/2024 0.14     Basophils, Absolute 02/12/2024 0.05     Immature Granulocytes, A* 02/12/2024 0.01     nRBC, Absolute 02/12/2024 0.00     Diff Type 02/12/2024 Auto     [place] Interval 02/12/2024 148     QRS Duration 02/12/2024 72     QT/QTc 02/12/2024 313/437     PRT Axes 02/12/2024 103/71/61     Heart Rate 02/12/2024 117     Results 02/12/2024         Last Imaging:  X-Ray Chest PA And Lateral  Narrative: EXAMINATION:  XR CHEST PA AND LATERAL    CLINICAL HISTORY:  Tachycardia, unspecified    TECHNIQUE:  PA and lateral views of the chest were performed.    COMPARISON:  12/13/2023    FINDINGS:  Cardiac silhouette borderline in size as before.  Aortic arch calcifications.  Lungs clear.  No pneumothorax.  No pleural effusion.  Impression: No acute findings.    Electronically signed by: Olivier Puri  Date:    02/19/2024  Time:    10:42         **Labs and x-rays personally reviewed by me    ** reviewed      Objective:        Assessment:       1. Tachycardia             Plan:         1. Tachycardia    Other orders  -     amLODIPine (NORVASC) 5 MG tablet; Take 1 tablet (5 mg total) by mouth once daily.  Dispense: 90 tablet; Refill: 1  -     apixaban (ELIQUIS) 5 mg Tab; TAKE ONE (1) TABLET BY MOUTH TWICE DAILY AS DIRECTED  Dispense: 60 tablet; Refill: 11  -     atorvastatin (LIPITOR) 20 MG tablet; Take 1 tablet (20 mg total) by mouth once daily.  Dispense: 90 tablet; Refill: 1  -     ferrous sulfate (FEROSUL) 325 mg (65 mg iron) Tab tablet;  Take 1 tablet (325 mg total) by mouth once daily.  Dispense: 180 tablet; Refill: 1  -     furosemide (LASIX) 40 MG tablet; Take 1 tablet (40 mg total) by mouth once daily.  Dispense: 90 tablet; Refill: 0  -     meclizine (ANTIVERT) 25 MG tablet; Take 1 tablet (25 mg total) by mouth every 12 (twelve) hours as needed for Dizziness or Nausea.  Dispense: 90 tablet; Refill: 1  -     metoprolol succinate (TOPROL-XL) 25 MG 24 hr tablet; Take 0.5 tablets (12.5 mg total) by mouth 2 (two) times daily.  Dispense: 15 tablet; Refill: 1  -     naproxen (EC-NAPROSYN) 375 MG TbEC EC tablet; Take 1 tablet (375 mg total) by mouth daily as needed (knee pain).  Dispense: 30 tablet; Refill: 0  -     omeprazole (PRILOSEC) 20 MG capsule; Take 1 capsule (20 mg total) by mouth once daily.  Dispense: 90 capsule; Refill: 1  -     pioglitazone (ACTOS) 30 MG tablet; Take 1 tablet (30 mg total) by mouth once daily.  Dispense: 90 tablet; Refill: 1  -     potassium chloride (MICRO-K) 10 MEQ CpSR; Take 1 capsule (10 mEq total) by mouth once daily.  Dispense: 90 capsule; Refill: 0

## 2024-03-18 PROBLEM — I63.9 STROKE: Status: RESOLVED | Noted: 2023-12-14 | Resolved: 2024-03-18

## 2024-03-26 ENCOUNTER — OFFICE VISIT (OUTPATIENT)
Dept: FAMILY MEDICINE | Facility: CLINIC | Age: 84
End: 2024-03-26
Payer: MEDICARE

## 2024-03-26 VITALS
DIASTOLIC BLOOD PRESSURE: 72 MMHG | SYSTOLIC BLOOD PRESSURE: 107 MMHG | HEART RATE: 87 BPM | BODY MASS INDEX: 29.34 KG/M2 | OXYGEN SATURATION: 99 % | TEMPERATURE: 98 F | WEIGHT: 136 LBS | HEIGHT: 57 IN | RESPIRATION RATE: 17 BRPM

## 2024-03-26 DIAGNOSIS — I10 ESSENTIAL (PRIMARY) HYPERTENSION: Primary | ICD-10-CM

## 2024-03-26 PROCEDURE — 99212 OFFICE O/P EST SF 10 MIN: CPT | Mod: ,,, | Performed by: INTERNAL MEDICINE

## 2024-04-03 NOTE — PROGRESS NOTES
Subjective:       Patient ID: Zbigniew Ventura is a 83 y.o. female.    Chief Complaint: Tachycardia    HPI  .  Patient seen evaluated denies chest palpitations patient blood pressure is now stable also heart rate is stable.  Current Medications:    Current Outpatient Medications:     amLODIPine (NORVASC) 5 MG tablet, Take 1 tablet (5 mg total) by mouth once daily., Disp: 90 tablet, Rfl: 1    apixaban (ELIQUIS) 5 mg Tab, TAKE ONE (1) TABLET BY MOUTH TWICE DAILY AS DIRECTED, Disp: 60 tablet, Rfl: 11    atorvastatin (LIPITOR) 20 MG tablet, Take 1 tablet (20 mg total) by mouth once daily., Disp: 90 tablet, Rfl: 1    ferrous sulfate (FEROSUL) 325 mg (65 mg iron) Tab tablet, Take 1 tablet (325 mg total) by mouth once daily., Disp: 180 tablet, Rfl: 1    furosemide (LASIX) 40 MG tablet, Take 1 tablet (40 mg total) by mouth once daily., Disp: 90 tablet, Rfl: 0    levETIRAcetam (KEPPRA) 500 MG Tab, Take 1 tablet (500 mg total) by mouth 2 (two) times daily., Disp: 120 tablet, Rfl: 1    meclizine (ANTIVERT) 25 MG tablet, Take 1 tablet (25 mg total) by mouth every 12 (twelve) hours as needed for Dizziness or Nausea., Disp: 90 tablet, Rfl: 1    metoprolol succinate (TOPROL-XL) 25 MG 24 hr tablet, Take 0.5 tablets (12.5 mg total) by mouth 2 (two) times daily., Disp: 15 tablet, Rfl: 1    naproxen (EC-NAPROSYN) 375 MG TbEC EC tablet, Take 1 tablet (375 mg total) by mouth daily as needed (knee pain)., Disp: 30 tablet, Rfl: 0    omeprazole (PRILOSEC) 20 MG capsule, Take 1 capsule (20 mg total) by mouth once daily., Disp: 90 capsule, Rfl: 1    pioglitazone (ACTOS) 30 MG tablet, Take 1 tablet (30 mg total) by mouth once daily., Disp: 90 tablet, Rfl: 1    potassium chloride (MICRO-K) 10 MEQ CpSR, Take 1 capsule (10 mEq total) by mouth once daily., Disp: 90 capsule, Rfl: 0           ROS  Twelve point system reviewed, unremarkable except for stated above in HPI.        Objective:         Vitals:    03/26/24 1108   BP: 107/72   BP Location: Left  "arm   Patient Position: Sitting   BP Method: Large (Automatic)   Pulse: 87   Resp: 17   Temp: 97.6 °F (36.4 °C)   TempSrc: Temporal   SpO2: 99%   Weight: 61.7 kg (136 lb)   Height: 4' 9" (1.448 m)        Physical Exam     Patient is awake alert oriented person place and  Lungs are clear to auscultation bilaterally no crackles or wheezes   Cardiovascular S1-S2 regular rate and rhythm no murmurs rubs or gallops   Abdomen is soft positive bowel sounds nontender, extremities no clubbing cyanosis edema  Neuro no focal neurological deficits  Skin warm and dry.     Last Labs:     No visits with results within 1 Month(s) from this visit.   Latest known visit with results is:   Office Visit on 02/19/2024   Component Date Value    ME Interval 02/19/2024 123     QRS Duration 02/19/2024 79     QT/QTc 02/19/2024 311/437     PRT Axes 02/19/2024 -88/65/45     Heart Rate 02/19/2024 118     Results 02/19/2024         Last Imaging:  X-Ray Chest PA And Lateral  Narrative: EXAMINATION:  XR CHEST PA AND LATERAL    CLINICAL HISTORY:  Tachycardia, unspecified    TECHNIQUE:  PA and lateral views of the chest were performed.    COMPARISON:  12/13/2023    FINDINGS:  Cardiac silhouette borderline in size as before.  Aortic arch calcifications.  Lungs clear.  No pneumothorax.  No pleural effusion.  Impression: No acute findings.    Electronically signed by: Olivier Puri  Date:    02/19/2024  Time:    10:42         **Labs and x-rays personally reviewed by me    ** reviewed           Assessment & Plan:       1. Essential (primary) hypertension    Stable continue current medical        Farhad Culver MD    "

## 2024-04-17 ENCOUNTER — OFFICE VISIT (OUTPATIENT)
Dept: CARDIOLOGY | Facility: CLINIC | Age: 84
End: 2024-04-17
Payer: MEDICARE

## 2024-04-17 ENCOUNTER — HOSPITAL ENCOUNTER (OUTPATIENT)
Dept: RADIOLOGY | Facility: HOSPITAL | Age: 84
Discharge: HOME | End: 2024-04-17
Attending: INTERNAL MEDICINE
Payer: MEDICARE

## 2024-04-17 VITALS
HEART RATE: 117 BPM | WEIGHT: 139.81 LBS | DIASTOLIC BLOOD PRESSURE: 52 MMHG | HEIGHT: 60 IN | BODY MASS INDEX: 27.45 KG/M2 | SYSTOLIC BLOOD PRESSURE: 118 MMHG | OXYGEN SATURATION: 96 %

## 2024-04-17 DIAGNOSIS — Z79.899 ENCOUNTER FOR LONG-TERM (CURRENT) USE OF OTHER MEDICATIONS: ICD-10-CM

## 2024-04-17 DIAGNOSIS — I48.92 ATRIAL FLUTTER, UNSPECIFIED TYPE: ICD-10-CM

## 2024-04-17 DIAGNOSIS — I63.9 CEREBROVASCULAR ACCIDENT (CVA), UNSPECIFIED MECHANISM: Chronic | ICD-10-CM

## 2024-04-17 DIAGNOSIS — G40.909 NONINTRACTABLE EPILEPSY WITHOUT STATUS EPILEPTICUS, UNSPECIFIED EPILEPSY TYPE: Chronic | ICD-10-CM

## 2024-04-17 DIAGNOSIS — I48.92 ATRIAL FLUTTER, UNSPECIFIED TYPE: Primary | Chronic | ICD-10-CM

## 2024-04-17 DIAGNOSIS — I48.0 PAROXYSMAL ATRIAL FIBRILLATION: Chronic | ICD-10-CM

## 2024-04-17 DIAGNOSIS — I35.8 AORTIC HEART MURMUR: Chronic | ICD-10-CM

## 2024-04-17 DIAGNOSIS — I48.0 PAROXYSMAL ATRIAL FIBRILLATION: ICD-10-CM

## 2024-04-17 PROCEDURE — 99215 OFFICE O/P EST HI 40 MIN: CPT | Mod: S$PBB,,, | Performed by: INTERNAL MEDICINE

## 2024-04-17 PROCEDURE — 71046 X-RAY EXAM CHEST 2 VIEWS: CPT | Mod: 26,,, | Performed by: RADIOLOGY

## 2024-04-17 PROCEDURE — 71046 X-RAY EXAM CHEST 2 VIEWS: CPT | Mod: TC

## 2024-04-17 PROCEDURE — 93005 ELECTROCARDIOGRAM TRACING: CPT | Mod: PBBFAC | Performed by: INTERNAL MEDICINE

## 2024-04-17 PROCEDURE — 99214 OFFICE O/P EST MOD 30 MIN: CPT | Mod: PBBFAC,25 | Performed by: INTERNAL MEDICINE

## 2024-04-17 PROCEDURE — 93010 ELECTROCARDIOGRAM REPORT: CPT | Mod: S$PBB,,, | Performed by: INTERNAL MEDICINE

## 2024-04-18 LAB
OHS QRS DURATION: 72 MS
OHS QTC CALCULATION: 455 MS

## 2024-04-18 NOTE — PROGRESS NOTES
PCP: Farhad Culver MD    Referring Provider:     Subjective:   Zbigniew Ventura is a 84 y.o. female with hx of NIDDM, HTN,  HLD, CVA, seizures, and paroxysmal atrial fibrillation/ flutter who presents for follow up.  Denies chest pain.  C/o occasional palpitations.     10/17/23--Zbigniew Ventura is a 84 y.o. female with hx of NIDDM, HTN,  HLD, CVA, seizures, and paroxysmal atrial fibrillation who presents for chest pain.  Referred by Dr. Culver.  Patient denies chest pain, c/o occasional palpitations.       Fhx:No family history on file.  Shx:   Social History     Socioeconomic History    Marital status:    Occupational History    Occupation: disabled   Tobacco Use    Smoking status: Never     Passive exposure: Never    Smokeless tobacco: Never   Substance and Sexual Activity    Alcohol use: Never    Drug use: Never    Sexual activity: Not Currently     Social Determinants of Health     Financial Resource Strain: Medium Risk (11/10/2023)    Overall Financial Resource Strain (CARDIA)     Difficulty of Paying Living Expenses: Somewhat hard   Food Insecurity: Food Insecurity Present (11/10/2023)    Hunger Vital Sign     Worried About Running Out of Food in the Last Year: Often true     Ran Out of Food in the Last Year: Often true   Transportation Needs: Unmet Transportation Needs (11/10/2023)    PRAPARE - Transportation     Lack of Transportation (Medical): No     Lack of Transportation (Non-Medical): Yes   Physical Activity: Inactive (11/10/2023)    Exercise Vital Sign     Days of Exercise per Week: 0 days     Minutes of Exercise per Session: 0 min   Stress: No Stress Concern Present (11/10/2023)    [employer] [employer] - Occupational Stress Questionnaire     Feeling of Stress : Only a little   Social Connections: Moderately Isolated (11/10/2023)    Social Connection and Isolation Panel [NHANES]     Frequency of Communication with Friends and Family: Never     Frequency of Social Gatherings with  Friends and Family: Never     Attends Sabianism Services: 1 to 4 times per year     Active Member of Clubs or Organizations: No     Attends Club or Organization Meetings: Never     Marital Status:    Housing Stability: High Risk (11/10/2023)    Housing Stability Vital Sign     Unable to Pay for Housing in the Last Year: Yes     Number of Places Lived in the Last Year: 1     Unstable Housing in the Last Year: No       4/17/24--sinus tachycardia, 116 bpm  10/17/23--NSR, 74 bpm  10/11/22--NSR, nonspecific T wave abn, 78 bpm.      Zio:  12/15/23-12/27/23--Atral flutter/ fibrillation.        Echo    10/20/23--Interpretation Summary    Left Ventricle: The left ventricle is normal in size. Normal wall thickness. There is normal systolic function. Ejection fraction by visual approximation is 65%.    Left Atrium: Left atrium is mildly dilated.23.82cm2    Right Ventricle: Normal right ventricular cavity size. Systolic function is normal.    Aortic Valve: The aortic valve is a trileaflet valve. There is mild aortic valve sclerosis.    Mitral Valve: There is mild regurgitation.    Tricuspid Valve: There is mild regurgitation.    IVC/SVC: Normal venous pressure at 3 mmHg.    10/6/22--Interpretation Summary  · The left ventricle is normal in size with low normal systolic function.  · The estimated ejection fraction is 50%.  · Normal left ventricular diastolic function.  · Normal right ventricular size.  · Mild mitral regurgitation.  · Mild tricuspid regurgitation.  · Normal central venous pressure (3 mmHg).  · The estimated PA systolic pressure is 30 mmHg.  · There is left ventricular global hypokinesis.      Lab Results   Component Value Date     04/17/2024    K 4.9 04/17/2024     04/17/2024    CO2 23 04/17/2024    BUN 38 (H) 04/17/2024    CREATININE 1.65 (H) 04/17/2024    CALCIUM 9.0 04/17/2024    ANIONGAP 12 04/17/2024    ESTGFRAFRICA 43 06/29/2020    EGFRNONAA 54 (L) 06/21/2022       Lab Results   Component  Value Date    CHOL 155 12/14/2023    CHOL 166 11/10/2023    CHOL 152 11/07/2022     Lab Results   Component Value Date    HDL 74 (H) 12/14/2023    HDL 73 (H) 11/10/2023    HDL 47 11/07/2022     Lab Results   Component Value Date    LDLCALC 64 12/14/2023    LDLCALC 62 11/10/2023    LDLCALC 66 11/07/2022     Lab Results   Component Value Date    TRIG 84 12/14/2023    TRIG 154 (H) 11/10/2023    TRIG 193 (H) 11/07/2022     Lab Results   Component Value Date    CHOLHDL 2.1 12/14/2023    CHOLHDL 2.3 11/10/2023    CHOLHDL 3.2 11/07/2022       Lab Results   Component Value Date    WBC 5.80 04/17/2024    HGB 9.8 (L) 04/17/2024    HCT 31.7 (L) 04/17/2024    MCV 94.6 04/17/2024     04/17/2024           Current Outpatient Medications:     amLODIPine (NORVASC) 5 MG tablet, Take 1 tablet (5 mg total) by mouth once daily., Disp: 90 tablet, Rfl: 1    apixaban (ELIQUIS) 5 mg Tab, TAKE ONE (1) TABLET BY MOUTH TWICE DAILY AS DIRECTED, Disp: 60 tablet, Rfl: 11    atorvastatin (LIPITOR) 20 MG tablet, Take 1 tablet (20 mg total) by mouth once daily., Disp: 90 tablet, Rfl: 1    ferrous sulfate (FEROSUL) 325 mg (65 mg iron) Tab tablet, Take 1 tablet (325 mg total) by mouth once daily., Disp: 180 tablet, Rfl: 1    levETIRAcetam (KEPPRA) 500 MG Tab, Take 1 tablet (500 mg total) by mouth 2 (two) times daily., Disp: 120 tablet, Rfl: 1    meclizine (ANTIVERT) 25 MG tablet, Take 1 tablet (25 mg total) by mouth every 12 (twelve) hours as needed for Dizziness or Nausea., Disp: 90 tablet, Rfl: 1    naproxen (EC-NAPROSYN) 375 MG TbEC EC tablet, Take 1 tablet (375 mg total) by mouth daily as needed (knee pain)., Disp: 30 tablet, Rfl: 0    omeprazole (PRILOSEC) 20 MG capsule, Take 1 capsule (20 mg total) by mouth once daily., Disp: 90 capsule, Rfl: 1    pioglitazone (ACTOS) 30 MG tablet, Take 1 tablet (30 mg total) by mouth once daily., Disp: 90 tablet, Rfl: 1    furosemide (LASIX) 40 MG tablet, Take 1 tablet (40 mg total) by mouth once daily.  (Patient not taking: Reported on 4/17/2024), Disp: 90 tablet, Rfl: 0    metoprolol succinate (TOPROL-XL) 50 MG 24 hr tablet, Take 1 tablet (50 mg total) by mouth once daily., Disp: 90 tablet, Rfl: 3    potassium chloride (MICRO-K) 10 MEQ CpSR, Take 1 capsule (10 mEq total) by mouth once daily. (Patient not taking: Reported on 4/17/2024), Disp: 90 capsule, Rfl: 0  Medications reviewed and reconciled.    Review of Systems   Respiratory:  Negative for shortness of breath.    Cardiovascular:  Positive for palpitations. Negative for chest pain and leg swelling.   Neurological:  Positive for dizziness. Negative for loss of consciousness.           Objective:   BP (!) 118/52 (BP Location: Left arm, Patient Position: Sitting)   Pulse (!) 117   Ht 5' (1.524 m)   Wt 63.4 kg (139 lb 12.8 oz)   LMP  (LMP Unknown)   SpO2 96%   BMI 27.30 kg/m²     Physical Exam  Vitals reviewed.   Constitutional:       General: She is not in acute distress.     Appearance: Normal appearance.   HENT:      Head: Normocephalic and atraumatic.   Neck:      Vascular: No carotid bruit or JVD.      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      Pulses: Normal pulses.           Radial pulses are 2+ on the right side and 2+ on the left side.        Dorsalis pedis pulses are 2+ on the right side.      Comments: I-II/ VI ZACH  Pulmonary:      Effort: Pulmonary effort is normal.      Breath sounds: Normal breath sounds.   Musculoskeletal:      Right lower leg: No edema.      Left lower leg: No edema.   Skin:     General: Skin is warm and dry.   Neurological:      Mental Status: She is alert.           Assessment:     1. Atrial flutter, unspecified type  Echo    X-Ray Chest PA And Lateral    CBC Auto Differential    Comprehensive Metabolic Panel    TSH    Vitamin B12    Folate    EKG 12-lead    EKG 12-lead      2. Paroxysmal atrial fibrillation  Echo    X-Ray Chest PA  And Lateral    CBC Auto Differential    Comprehensive Metabolic Panel    TSH    Vitamin B12    Folate    EKG 12-lead    EKG 12-lead      3. Aortic heart murmur        4. Encounter for long-term (current) use of other medications  Echo    X-Ray Chest PA And Lateral    CBC Auto Differential    Comprehensive Metabolic Panel    TSH    Vitamin B12    Folate      5. Cerebrovascular accident (CVA), unspecified mechanism      left hemiparesis      6. Nonintractable epilepsy without status epilepticus, unspecified epilepsy type              Plan:   CBC, CMP, TSH, B12, folate  Echo, call with results  Cxr- pa/ lat  Increase Toprol to 50 mg one po qd  B/p, hr check in 2 weeks  F/u in 6 months.

## 2024-04-19 PROBLEM — I35.8 AORTIC HEART MURMUR: Chronic | Status: ACTIVE | Noted: 2024-04-19

## 2024-04-19 RX ORDER — METOPROLOL SUCCINATE 50 MG/1
50 TABLET, EXTENDED RELEASE ORAL DAILY
Qty: 90 TABLET | Refills: 3 | Status: SHIPPED | OUTPATIENT
Start: 2024-04-19 | End: 2024-05-01 | Stop reason: DRUGHIGH

## 2024-05-01 ENCOUNTER — CLINICAL SUPPORT (OUTPATIENT)
Dept: CARDIOLOGY | Facility: CLINIC | Age: 84
End: 2024-05-01
Payer: MEDICARE

## 2024-05-01 VITALS — SYSTOLIC BLOOD PRESSURE: 100 MMHG | HEART RATE: 114 BPM | OXYGEN SATURATION: 96 % | DIASTOLIC BLOOD PRESSURE: 52 MMHG

## 2024-05-01 DIAGNOSIS — I48.92 ATRIAL FLUTTER, UNSPECIFIED TYPE: Primary | ICD-10-CM

## 2024-05-01 PROCEDURE — 99212 OFFICE O/P EST SF 10 MIN: CPT | Mod: PBBFAC

## 2024-05-01 RX ORDER — METOPROLOL SUCCINATE 100 MG/1
100 TABLET, EXTENDED RELEASE ORAL DAILY
Qty: 90 TABLET | Refills: 3 | Status: CANCELLED | OUTPATIENT
Start: 2024-05-01 | End: 2025-05-01

## 2024-05-01 RX ORDER — METOPROLOL SUCCINATE 100 MG/1
100 TABLET, EXTENDED RELEASE ORAL DAILY
Qty: 90 TABLET | Refills: 3 | Status: SHIPPED | OUTPATIENT
Start: 2024-05-01 | End: 2025-05-01

## 2024-05-01 NOTE — PROGRESS NOTES
Dr. Alcazar reviewed and will increase Toprol to 100 mg qd.  Recheck in 2 weeks.  If still elevated, may need DCCV.

## 2024-05-02 ENCOUNTER — HOSPITAL ENCOUNTER (OUTPATIENT)
Dept: CARDIOLOGY | Facility: HOSPITAL | Age: 84
Discharge: HOME | End: 2024-05-02
Attending: INTERNAL MEDICINE
Payer: MEDICARE

## 2024-05-02 VITALS — BODY MASS INDEX: 27.29 KG/M2 | WEIGHT: 139 LBS | HEIGHT: 60 IN

## 2024-05-02 DIAGNOSIS — I48.92 ATRIAL FLUTTER, UNSPECIFIED TYPE: Chronic | ICD-10-CM

## 2024-05-02 DIAGNOSIS — Z79.899 ENCOUNTER FOR LONG-TERM (CURRENT) USE OF OTHER MEDICATIONS: ICD-10-CM

## 2024-05-02 DIAGNOSIS — I48.0 PAROXYSMAL ATRIAL FIBRILLATION: Chronic | ICD-10-CM

## 2024-05-02 PROCEDURE — 93306 TTE W/DOPPLER COMPLETE: CPT

## 2024-05-02 PROCEDURE — 93306 TTE W/DOPPLER COMPLETE: CPT | Mod: 26,,, | Performed by: INTERNAL MEDICINE

## 2024-05-02 RX ORDER — NAPROXEN 375 MG/1
TABLET ORAL
Qty: 30 TABLET | Refills: 0 | Status: SHIPPED | OUTPATIENT
Start: 2024-05-02 | End: 2024-05-29 | Stop reason: SDUPTHER

## 2024-05-16 ENCOUNTER — CLINICAL SUPPORT (OUTPATIENT)
Dept: CARDIOLOGY | Facility: CLINIC | Age: 84
End: 2024-05-16
Payer: MEDICARE

## 2024-05-16 VITALS — SYSTOLIC BLOOD PRESSURE: 110 MMHG | DIASTOLIC BLOOD PRESSURE: 56 MMHG | HEART RATE: 103 BPM | OXYGEN SATURATION: 99 %

## 2024-05-16 DIAGNOSIS — Z09 FOLLOW-UP AFTER CIRCUMCISION: Primary | ICD-10-CM

## 2024-05-16 PROCEDURE — 99212 OFFICE O/P EST SF 10 MIN: CPT | Mod: PBBFAC

## 2024-06-01 LAB
AORTIC ROOT ANNULUS: 3.12 CM
AORTIC VALVE CUSP SEPERATION: 1.84 CM
AV INDEX (PROSTH): 0.95
AV MEAN GRADIENT: 2 MMHG
AV PEAK GRADIENT: 4 MMHG
AV VALVE AREA BY VELOCITY RATIO: 2.52 CM²
AV VALVE AREA: 3 CM²
AV VELOCITY RATIO: 0.8
BSA FOR ECHO PROCEDURE: 1.63 M2
CV ECHO LV RWT: 0.55 CM
DOP CALC AO PEAK VEL: 1.03 M/S
DOP CALC AO VTI: 19 CM
DOP CALC LVOT AREA: 3.2 CM2
DOP CALC LVOT DIAMETER: 2.01 CM
DOP CALC LVOT PEAK VEL: 0.82 M/S
DOP CALC LVOT STROKE VOLUME: 57.09 CM3
DOP CALCLVOT PEAK VEL VTI: 18 CM
E WAVE DECELERATION TIME: 65.02 MSEC
E/A RATIO: 2.09
E/E' RATIO: 8.55 M/S
ECHO LV POSTERIOR WALL: 1.09 CM (ref 0.6–1.1)
EJECTION FRACTION: 50 %
FRACTIONAL SHORTENING: 29 % (ref 28–44)
INTERVENTRICULAR SEPTUM: 1.1 CM (ref 0.6–1.1)
IVC DIAMETER: 1.93 CM
LEFT ATRIUM SIZE: 4.5 CM
LEFT ATRIUM VOLUME INDEX MOD: 38.6 ML/M2
LEFT ATRIUM VOLUME MOD: 61.73 CM3
LEFT INTERNAL DIMENSION IN SYSTOLE: 2.84 CM (ref 2.1–4)
LEFT VENTRICLE DIASTOLIC VOLUME INDEX: 43.48 ML/M2
LEFT VENTRICLE DIASTOLIC VOLUME: 69.57 ML
LEFT VENTRICLE MASS INDEX: 90 G/M2
LEFT VENTRICLE SYSTOLIC VOLUME INDEX: 19.1 ML/M2
LEFT VENTRICLE SYSTOLIC VOLUME: 30.6 ML
LEFT VENTRICULAR INTERNAL DIMENSION IN DIASTOLE: 3.99 CM (ref 3.5–6)
LEFT VENTRICULAR MASS: 144.13 G
LV LATERAL E/E' RATIO: 7.23 M/S
LV SEPTAL E/E' RATIO: 10.44 M/S
LVOT MG: 1.73 MMHG
LVOT MV: 0.63 CM/S
MV PEAK A VEL: 0.45 M/S
MV PEAK E VEL: 0.94 M/S
MV STENOSIS PRESSURE HALF TIME: 18.86 MS
MV VALVE AREA P 1/2 METHOD: 11.66 CM2
OHS CV RV/LV RATIO: 0.75 CM
OHS LV EJECTION FRACTION SIMPSONS BIPLANE MOD: 51 %
PISA MRMAX VEL: 3.12 M/S
PISA TR MAX VEL: 2.24 M/S
PV PEAK GRADIENT: 3 MMHG
PV PEAK VELOCITY: 0.8 M/S
RA VOL SYS: 28.07 ML
RIGHT ATRIAL AREA: 13.6 CM2
RIGHT VENTRICLE DIASTOLIC LENGTH: 6.1 CM
RIGHT VENTRICLE DIASTOLIC MID DIMENSION: 1.6 CM
RIGHT VENTRICULAR END-DIASTOLIC DIMENSION: 3 CM
RIGHT VENTRICULAR LENGTH IN DIASTOLE (APICAL 4-CHAMBER VIEW): 6.13 CM
RV MID DIAMA: 1.58 CM
TDI LATERAL: 0.13 M/S
TDI SEPTAL: 0.09 M/S
TDI: 0.11 M/S
TR MAX PG: 20 MMHG
TRICUSPID ANNULAR PLANE SYSTOLIC EXCURSION: 2.08 CM
Z-SCORE OF LEFT VENTRICULAR DIMENSION IN END DIASTOLE: -1.32
Z-SCORE OF LEFT VENTRICULAR DIMENSION IN END SYSTOLE: 0.05

## 2024-06-03 RX ORDER — NAPROXEN 375 MG/1
TABLET ORAL
Qty: 30 TABLET | Refills: 0 | Status: SHIPPED | OUTPATIENT
Start: 2024-06-03

## 2024-06-05 ENCOUNTER — TELEPHONE (OUTPATIENT)
Dept: CARDIOLOGY | Facility: CLINIC | Age: 84
End: 2024-06-05
Payer: MEDICARE

## 2024-07-22 PROBLEM — I63.9 CEREBROVASCULAR ACCIDENT (CVA): Status: RESOLVED | Noted: 2023-12-14 | Resolved: 2024-07-22

## 2024-07-25 ENCOUNTER — OFFICE VISIT (OUTPATIENT)
Dept: FAMILY MEDICINE | Facility: CLINIC | Age: 84
End: 2024-07-25
Payer: MEDICARE

## 2024-07-25 VITALS
HEART RATE: 105 BPM | HEIGHT: 60 IN | TEMPERATURE: 98 F | BODY MASS INDEX: 27.68 KG/M2 | WEIGHT: 141 LBS | OXYGEN SATURATION: 97 % | SYSTOLIC BLOOD PRESSURE: 105 MMHG | DIASTOLIC BLOOD PRESSURE: 71 MMHG | RESPIRATION RATE: 17 BRPM

## 2024-07-25 DIAGNOSIS — I10 ESSENTIAL (PRIMARY) HYPERTENSION: Primary | ICD-10-CM

## 2024-07-25 DIAGNOSIS — R42 VERTIGO: ICD-10-CM

## 2024-07-25 DIAGNOSIS — E11.9 TYPE 2 DIABETES MELLITUS WITHOUT COMPLICATION, WITHOUT LONG-TERM CURRENT USE OF INSULIN: ICD-10-CM

## 2024-07-25 DIAGNOSIS — E78.5 DYSLIPIDEMIA: ICD-10-CM

## 2024-07-25 PROCEDURE — 99214 OFFICE O/P EST MOD 30 MIN: CPT | Mod: ,,, | Performed by: INTERNAL MEDICINE

## 2024-07-29 PROBLEM — E78.5 DYSLIPIDEMIA: Status: ACTIVE | Noted: 2024-07-29

## 2024-07-29 PROBLEM — E11.9 TYPE 2 DIABETES MELLITUS WITHOUT COMPLICATION, WITHOUT LONG-TERM CURRENT USE OF INSULIN: Status: ACTIVE | Noted: 2024-07-29

## 2024-07-29 RX ORDER — METOPROLOL SUCCINATE 100 MG/1
100 TABLET, EXTENDED RELEASE ORAL DAILY
Qty: 90 TABLET | Refills: 3 | Status: SHIPPED | OUTPATIENT
Start: 2024-07-29 | End: 2025-07-29

## 2024-07-29 RX ORDER — LEVETIRACETAM 500 MG/1
500 TABLET ORAL 2 TIMES DAILY
Qty: 120 TABLET | Refills: 1 | Status: SHIPPED | OUTPATIENT
Start: 2024-07-29

## 2024-07-29 RX ORDER — NAPROXEN 375 MG/1
TABLET ORAL
Qty: 30 TABLET | Refills: 0 | Status: SHIPPED | OUTPATIENT
Start: 2024-07-29

## 2024-07-29 RX ORDER — AMLODIPINE BESYLATE 5 MG/1
5 TABLET ORAL DAILY
Qty: 90 TABLET | Refills: 1 | Status: SHIPPED | OUTPATIENT
Start: 2024-07-29

## 2024-07-29 RX ORDER — MECLIZINE HCL 25MG 25 MG/1
25 TABLET, CHEWABLE ORAL EVERY 12 HOURS PRN
Qty: 90 TABLET | Refills: 1 | Status: SHIPPED | OUTPATIENT
Start: 2024-07-29

## 2024-07-29 RX ORDER — ATORVASTATIN CALCIUM 20 MG/1
20 TABLET, FILM COATED ORAL DAILY
Qty: 90 TABLET | Refills: 1 | Status: SHIPPED | OUTPATIENT
Start: 2024-07-29

## 2024-07-29 RX ORDER — PIOGLITAZONEHYDROCHLORIDE 30 MG/1
30 TABLET ORAL DAILY
Qty: 90 TABLET | Refills: 1 | Status: SHIPPED | OUTPATIENT
Start: 2024-07-29

## 2024-07-29 RX ORDER — FERROUS SULFATE 325(65) MG
325 TABLET ORAL DAILY
Qty: 180 TABLET | Refills: 1 | Status: SHIPPED | OUTPATIENT
Start: 2024-07-29

## 2024-07-29 RX ORDER — OMEPRAZOLE 20 MG/1
20 CAPSULE, DELAYED RELEASE ORAL DAILY
Qty: 90 CAPSULE | Refills: 1 | Status: SHIPPED | OUTPATIENT
Start: 2024-07-29

## 2024-07-29 NOTE — PROGRESS NOTES
Subjective:       Patient ID: Zbigniew Ventura is a 84 y.o. female.    Chief Complaint: Medication Refill and Health Maintenance (Care gaps discussed pt refused care gaps during this visit )    HPI  .  Patient presents with multiple medical problems chronic hypertension chronic dyslipidemia intermittent vertigo.  Patient needs several medicines refilled patient is in no acute distress stated the blood sugar has been stable blood pressure is stable at 0 5 1 today patient has not endorsed chest pain or shortness for breath    Current Medications:    Current Outpatient Medications:     amLODIPine (NORVASC) 5 MG tablet, Take 1 tablet (5 mg total) by mouth once daily., Disp: 90 tablet, Rfl: 1    apixaban (ELIQUIS) 5 mg Tab, TAKE ONE (1) TABLET BY MOUTH TWICE DAILY AS DIRECTED, Disp: 60 tablet, Rfl: 11    atorvastatin (LIPITOR) 20 MG tablet, Take 1 tablet (20 mg total) by mouth once daily., Disp: 90 tablet, Rfl: 1    ferrous sulfate (FEROSUL) 325 mg (65 mg iron) Tab tablet, Take 1 tablet (325 mg total) by mouth once daily., Disp: 180 tablet, Rfl: 1    furosemide (LASIX) 40 MG tablet, Take 1 tablet (40 mg total) by mouth once daily. (Patient not taking: Reported on 4/17/2024), Disp: 90 tablet, Rfl: 0    levETIRAcetam (KEPPRA) 500 MG Tab, Take 1 tablet (500 mg total) by mouth 2 (two) times daily., Disp: 120 tablet, Rfl: 1    meclizine (ANTIVERT) 25 MG tablet, Take 1 tablet (25 mg total) by mouth every 12 (twelve) hours as needed for Dizziness or Nausea., Disp: 90 tablet, Rfl: 1    metoprolol succinate (TOPROL-XL) 100 MG 24 hr tablet, Take 1 tablet (100 mg total) by mouth once daily., Disp: 90 tablet, Rfl: 3    naproxen (NAPROSYN) 375 MG tablet, TAKE ONE (1) TABLET BY MOUTH ONCE A DAY WITH FOOD AS NEEDED FOR PAIN, Disp: 30 tablet, Rfl: 0    omeprazole (PRILOSEC) 20 MG capsule, Take 1 capsule (20 mg total) by mouth once daily., Disp: 90 capsule, Rfl: 1    pioglitazone (ACTOS) 30 MG tablet, Take 1 tablet (30 mg total) by mouth once  daily., Disp: 90 tablet, Rfl: 1    potassium chloride (MICRO-K) 10 MEQ CpSR, Take 1 capsule (10 mEq total) by mouth once daily. (Patient not taking: Reported on 4/17/2024), Disp: 90 capsule, Rfl: 0           ROS  Twelve point system reviewed, unremarkable except for stated above in HPI.        Objective:         Vitals:    07/25/24 1003   BP: 105/71   BP Location: Left arm   Patient Position: Sitting   BP Method: Large (Automatic)   Pulse: 105   Resp: 17   Temp: 97.9 °F (36.6 °C)   TempSrc: Temporal   SpO2: 97%   Weight: 64 kg (141 lb)   Height: 5' (1.524 m)        Physical Exam     Patient is awake alert oriented person place and  Lungs are clear to auscultation bilaterally no crackles or wheezes   Cardiovascular S1-S2 regular rate and rhythm no murmurs rubs or gallops   Abdomen is soft positive bowel sounds nontender, extremities no clubbing cyanosis edema  Neuro no focal neurological deficits  Skin warm and dry.     Last Labs:     No visits with results within 1 Month(s) from this visit.   Latest known visit with results is:   Hospital Outpatient Visit on 05/02/2024   Component Date Value    BSA 05/02/2024 1.63     Grimm's Biplane MOD Ej* 05/02/2024 51     LVOT stroke volume 05/02/2024 57.09     LVIDd 05/02/2024 3.99     LV Systolic Volume 05/02/2024 30.60     LV Systolic Volume Index 05/02/2024 19.1     LVIDs 05/02/2024 2.84     LV Diastolic Volume 05/02/2024 69.57     LV Diastolic Volume Index 05/02/2024 43.48     IVS 05/02/2024 1.10     LVOT diameter 05/02/2024 2.01     LVOT area 05/02/2024 3.2     FS 05/02/2024 29     Left Ventricle Relative * 05/02/2024 0.55     Posterior Wall 05/02/2024 1.09     LV mass 05/02/2024 144.13     LV Mass Index 05/02/2024 90     MV Peak E Bk 05/02/2024 0.94     TDI LATERAL 05/02/2024 0.13     TDI SEPTAL 05/02/2024 0.09     E/E' ratio 05/02/2024 8.55     MV Peak A Bk 05/02/2024 0.45     TR Max Bk 05/02/2024 2.24     E/A ratio 05/02/2024 2.09     E wave deceleration time  05/02/2024 65.02     LV SEPTAL E/E' RATIO 05/02/2024 10.44     LV LATERAL E/E' RATIO 05/02/2024 7.23     LVOT peak ginette 05/02/2024 0.82     Left Ventricular Outflow* 05/02/2024 0.63     Left Ventricular Outflow* 05/02/2024 1.73     Right ventricular length* 05/02/2024 6.13     RV mid diameter 05/02/2024 1.58     TAPSE 05/02/2024 2.08     LA volume (mod) 05/02/2024 61.73     LA Volume Index (Mod) 05/02/2024 38.6     RA area 05/02/2024 13.6     Right Atrium Volume Syst* 05/02/2024 28.07     AV mean gradient 05/02/2024 2     AV peak gradient 05/02/2024 4     Ao peak ginette 05/02/2024 1.03     Ao VTI 05/02/2024 19.00     LVOT peak VTI 05/02/2024 18.00     AV valve area 05/02/2024 3.00     AV Velocity Ratio 05/02/2024 0.80     AV index (prosthetic) 05/02/2024 0.95     KOJO by Velocity Ratio 05/02/2024 2.52     Mr max ginette 05/02/2024 3.12     MV stenosis pressure 1/2* 05/02/2024 18.86     MV valve area p 1/2 meth* 05/02/2024 11.66     Triscuspid Valve Regurgi* 05/02/2024 20     PV PEAK VELOCITY 05/02/2024 0.80     PV peak gradient 05/02/2024 3     Ao root annulus 05/02/2024 3.12     IVC diameter 05/02/2024 1.93     Mean e' 05/02/2024 0.11     ZLVIDS 05/02/2024 0.05     ZLVIDD 05/02/2024 -1.32     AORTIC VALVE CUSP SEPERA* 05/02/2024 1.84     RVDD 05/02/2024 3.00     RV-sena mid d 05/02/2024 1.6     RV-sena length 05/02/2024 6.1     RV/LV Ratio 05/02/2024 0.75     LA size 05/02/2024 4.5     EF 05/02/2024 50        Last Imaging:  Echo    Left Ventricle: The left ventricle is normal in size. Normal wall   thickness. There is low normal systolic function with a visually estimated   ejection fraction of 50 - 55%. Ejection fraction by visual approximation   is 50%. There is diastolic dysfunction.    Right Ventricle: Normal right ventricular cavity size.    Left Atrium: Left atrium is mildly dilated.    Aortic Valve: The aortic valve is a trileaflet valve. There is moderate   aortic valve sclerosis.    Mitral Valve: There is posterior  leaflet sclerosis. Mildly calcified   posterior leaflet. There is mild regurgitation.    Tricuspid Valve: There is mild regurgitation.         **Labs and x-rays personally reviewed by me    ** reviewed           Assessment & Plan:       1. Essential (primary) hypertension    2. Vertigo  -     levETIRAcetam (KEPPRA) 500 MG Tab; Take 1 tablet (500 mg total) by mouth 2 (two) times daily.  Dispense: 120 tablet; Refill: 1    3. Dyslipidemia    4. Type 2 diabetes mellitus without complication, without long-term current use of insulin    Other orders  -     pioglitazone (ACTOS) 30 MG tablet; Take 1 tablet (30 mg total) by mouth once daily.  Dispense: 90 tablet; Refill: 1  -     omeprazole (PRILOSEC) 20 MG capsule; Take 1 capsule (20 mg total) by mouth once daily.  Dispense: 90 capsule; Refill: 1  -     naproxen (NAPROSYN) 375 MG tablet; TAKE ONE (1) TABLET BY MOUTH ONCE A DAY WITH FOOD AS NEEDED FOR PAIN  Dispense: 30 tablet; Refill: 0  -     metoprolol succinate (TOPROL-XL) 100 MG 24 hr tablet; Take 1 tablet (100 mg total) by mouth once daily.  Dispense: 90 tablet; Refill: 3  -     meclizine (ANTIVERT) 25 MG tablet; Take 1 tablet (25 mg total) by mouth every 12 (twelve) hours as needed for Dizziness or Nausea.  Dispense: 90 tablet; Refill: 1  -     ferrous sulfate (FEROSUL) 325 mg (65 mg iron) Tab tablet; Take 1 tablet (325 mg total) by mouth once daily.  Dispense: 180 tablet; Refill: 1  -     atorvastatin (LIPITOR) 20 MG tablet; Take 1 tablet (20 mg total) by mouth once daily.  Dispense: 90 tablet; Refill: 1  -     apixaban (ELIQUIS) 5 mg Tab; TAKE ONE (1) TABLET BY MOUTH TWICE DAILY AS DIRECTED  Dispense: 60 tablet; Refill: 11  -     amLODIPine (NORVASC) 5 MG tablet; Take 1 tablet (5 mg total) by mouth once daily.  Dispense: 90 tablet; Refill: 1            Farhad Culver MD

## 2024-07-31 ENCOUNTER — OFFICE VISIT (OUTPATIENT)
Dept: NEUROLOGY | Facility: CLINIC | Age: 84
End: 2024-07-31
Payer: MEDICARE

## 2024-07-31 VITALS
HEIGHT: 60 IN | BODY MASS INDEX: 27.48 KG/M2 | RESPIRATION RATE: 18 BRPM | HEART RATE: 112 BPM | SYSTOLIC BLOOD PRESSURE: 97 MMHG | DIASTOLIC BLOOD PRESSURE: 61 MMHG | OXYGEN SATURATION: 98 % | WEIGHT: 140 LBS

## 2024-07-31 DIAGNOSIS — I63.9 CEREBROVASCULAR ACCIDENT (CVA), UNSPECIFIED MECHANISM: ICD-10-CM

## 2024-07-31 DIAGNOSIS — G40.909 NONINTRACTABLE EPILEPSY WITHOUT STATUS EPILEPTICUS, UNSPECIFIED EPILEPSY TYPE: Primary | ICD-10-CM

## 2024-07-31 DIAGNOSIS — R42 VERTIGO: ICD-10-CM

## 2024-07-31 DIAGNOSIS — G81.94 LEFT HEMIPARESIS: ICD-10-CM

## 2024-07-31 PROCEDURE — 99212 OFFICE O/P EST SF 10 MIN: CPT | Mod: S$PBB,,, | Performed by: NURSE PRACTITIONER

## 2024-07-31 PROCEDURE — 99999 PR PBB SHADOW E&M-EST. PATIENT-LVL V: CPT | Mod: PBBFAC,,, | Performed by: NURSE PRACTITIONER

## 2024-07-31 PROCEDURE — 99215 OFFICE O/P EST HI 40 MIN: CPT | Mod: PBBFAC | Performed by: NURSE PRACTITIONER

## 2024-07-31 NOTE — PROGRESS NOTES
Subjective:       Patient ID: Zbigniew Ventura is a 84 y.o. female     Chief Complaint:    No chief complaint on file.         Allergies:  Patient has no known allergies.    Current Medications:    Outpatient Encounter Medications as of 7/31/2024   Medication Sig Dispense Refill    amLODIPine (NORVASC) 5 MG tablet Take 1 tablet (5 mg total) by mouth once daily. 90 tablet 1    apixaban (ELIQUIS) 5 mg Tab TAKE ONE (1) TABLET BY MOUTH TWICE DAILY AS DIRECTED 60 tablet 11    atorvastatin (LIPITOR) 20 MG tablet Take 1 tablet (20 mg total) by mouth once daily. 90 tablet 1    ferrous sulfate (FEROSUL) 325 mg (65 mg iron) Tab tablet Take 1 tablet (325 mg total) by mouth once daily. 180 tablet 1    furosemide (LASIX) 40 MG tablet Take 1 tablet (40 mg total) by mouth once daily. (Patient not taking: Reported on 4/17/2024) 90 tablet 0    levETIRAcetam (KEPPRA) 500 MG Tab Take 1 tablet (500 mg total) by mouth 2 (two) times daily. 120 tablet 1    meclizine (ANTIVERT) 25 MG tablet Take 1 tablet (25 mg total) by mouth every 12 (twelve) hours as needed for Dizziness or Nausea. 90 tablet 1    metoprolol succinate (TOPROL-XL) 100 MG 24 hr tablet Take 1 tablet (100 mg total) by mouth once daily. 90 tablet 3    naproxen (NAPROSYN) 375 MG tablet TAKE ONE (1) TABLET BY MOUTH ONCE A DAY WITH FOOD AS NEEDED FOR PAIN 30 tablet 0    omeprazole (PRILOSEC) 20 MG capsule Take 1 capsule (20 mg total) by mouth once daily. 90 capsule 1    pioglitazone (ACTOS) 30 MG tablet Take 1 tablet (30 mg total) by mouth once daily. 90 tablet 1    potassium chloride (MICRO-K) 10 MEQ CpSR Take 1 capsule (10 mEq total) by mouth once daily. (Patient not taking: Reported on 4/17/2024) 90 capsule 0    [DISCONTINUED] amLODIPine (NORVASC) 5 MG tablet Take 1 tablet (5 mg total) by mouth once daily. 90 tablet 1    [DISCONTINUED] apixaban (ELIQUIS) 5 mg Tab TAKE ONE (1) TABLET BY MOUTH TWICE DAILY AS DIRECTED 60 tablet 11    [DISCONTINUED] atorvastatin  (LIPITOR) 20 MG tablet Take 1 tablet (20 mg total) by mouth once daily. 90 tablet 1    [DISCONTINUED] ferrous sulfate (FEROSUL) 325 mg (65 mg iron) Tab tablet Take 1 tablet (325 mg total) by mouth once daily. 180 tablet 1    [DISCONTINUED] levETIRAcetam (KEPPRA) 500 MG Tab Take 1 tablet (500 mg total) by mouth 2 (two) times daily. 120 tablet 1    [DISCONTINUED] meclizine (ANTIVERT) 25 MG tablet Take 1 tablet (25 mg total) by mouth every 12 (twelve) hours as needed for Dizziness or Nausea. 90 tablet 1    [DISCONTINUED] metoprolol succinate (TOPROL-XL) 100 MG 24 hr tablet Take 1 tablet (100 mg total) by mouth once daily. 90 tablet 3    [DISCONTINUED] naproxen (NAPROSYN) 375 MG tablet TAKE ONE (1) TABLET BY MOUTH ONCE A DAY WITH FOOD AS NEEDED FOR PAIN 30 tablet 0    [DISCONTINUED] omeprazole (PRILOSEC) 20 MG capsule Take 1 capsule (20 mg total) by mouth once daily. 90 capsule 1    [DISCONTINUED] pioglitazone (ACTOS) 30 MG tablet Take 1 tablet (30 mg total) by mouth once daily. 90 tablet 1    [DISCONTINUED] tamsulosin (FLOMAX) 0.4 mg Cap Take 1 capsule (0.4 mg total) by mouth once daily. 90 capsule 1     No facility-administered encounter medications on file as of 7/31/2024.       History of Present Illness  This Is a 84-year-old past history of diabetes and right hemispheric stroke and seizure.    Her family in room assists with communication, her .    They deny any seizures since her last visit with us and no new CVA symptoms.  She is on triple therapy including ASA 81mg as well as keppra 500mg BID which is filled by her primary care and denies any side effects.    She does have residual left-sided weakness from stroke but it is minimal. In     They do report that she has occasional episodes of dizziness, this since her prior CVA.  Her  has let her use his prescribed meclizine which worked very well.  She is being followed by cardiology, prior seen by Dr. Alcazar.  Noted more recently with  visit at the emergency department for possible atrial flutter.  They are encouraged to keep follow-up with cardiology regarding this         Review of Systems  Review of Systems   Constitutional:  Negative for diaphoresis and fever.   HENT:  Negative for congestion, hearing loss and tinnitus.    Eyes:  Negative for blurred vision, double vision, photophobia, discharge and redness.   Respiratory:  Negative for cough and shortness of breath.    Cardiovascular:  Negative for chest pain.   Gastrointestinal:  Negative for abdominal pain, nausea and vomiting.   Musculoskeletal:  Negative for back pain, joint pain, myalgias and neck pain.   Skin:  Negative for itching and rash.   Neurological:  Positive for dizziness and seizures. Negative for tremors, sensory change, speech change, focal weakness, loss of consciousness, weakness and headaches.   Psychiatric/Behavioral:  Negative for depression, hallucinations and memory loss. The patient does not have insomnia.    All other systems reviewed and are negative.     Objective:     NEUROLOGICAL EXAMINATION:     MENTAL STATUS   Oriented to person, place, and time.   Attention: normal. Concentration: normal.   Speech: speech is normal   Level of consciousness: alert  Knowledge: good and consistent with education.   Normal comprehension.     CRANIAL NERVES     CN II   Visual fields full to confrontation.   Visual acuity: normal  Right visual field deficit: none  Left visual field deficit: none     CN III, IV, VI   Pupils are equal, round, and reactive to light.  Extraocular motions are normal.   Right pupil: Size: 3 mm. Shape: regular. Reactivity: brisk. Consensual response: intact. Accommodation: intact.   Left pupil: Size: 3 mm. Shape: regular. Reactivity: brisk. Consensual response: intact. Accommodation: intact.   CN III: no CN III palsy  CN VI: no CN VI palsy  Nystagmus: none   Diplopia: none  Upgaze: normal  Downgaze: normal  Conjugate gaze: present  Vestibulo-ocular  reflex: present    CN V   Facial sensation intact.   Right facial sensation deficit: none  Left facial sensation deficit: none  Right corneal reflex: normal  Left corneal reflex: normal    CN VII   Facial expression full, symmetric.   Right facial weakness: none  Left facial weakness: none  Right taste: normal  Left taste: normal    CN VIII   CN VIII normal.   Hearing: intact    CN IX, X   CN IX normal.   CN X normal.   Palate: symmetric    CN XI   CN XI normal.   Right sternocleidomastoid strength: normal  Left sternocleidomastoid strength: normal  Right trapezius strength: normal  Left trapezius strength: normal    CN XII   CN XII normal.   Tongue: not atrophic  Fasciculations: absent  Tongue deviation: none    MOTOR EXAM   Muscle bulk: normal  Overall muscle tone: normal  Right arm tone: normal  Left arm tone: normal  Right arm pronator drift: absent  Left arm pronator drift: absent  Right leg tone: normal  Left leg tone: normal    Strength   Right neck flexion: 5/5  Left neck flexion: 5/5  Right neck extension: 5/5  Left neck extension: 5/5  Right deltoid: 5/5  Left deltoid: 5/5  Right biceps: 5/5  Left biceps: 5/5  Right triceps: 5/5  Left triceps: 5/5  Right wrist flexion: 5/5  Left wrist flexion: 5/5  Right wrist extension: 5/5  Left wrist extension: 5/5  Right interossei: 5/5  Left interossei: 5/5  Right iliopsoas: 5/5  Left iliopsoas: 5/5  Right quadriceps: 5/5  Left quadriceps: 5/5  Right hamstrin/5  Left hamstrin/5  Right anterior tibial: 5/5  Left anterior tibial: 5/5  Right posterior tibial: 5/5  Left posterior tibial: 5/5  Right gastroc: 5/5  Left gastroc: 5/5    REFLEXES     Reflexes   Right brachioradialis: 2+  Left brachioradialis: 2+  Right biceps: 2+  Left biceps: 2+  Right triceps: 2+  Left triceps: 2+  Right patellar: 2+  Left patellar: 2+  Right achilles: 2+  Left achilles: 2+  Right plantar: normal  Left plantar: normal  Right Calvin: absent  Left Calvin: absent  Right ankle clonus:  absent  Left ankle clonus: absent  Right pendular knee jerk: absent  Left pendular knee jerk: absent    SENSORY EXAM   Light touch normal.   Right arm light touch: normal  Left arm light touch: normal  Right leg light touch: normal  Left leg light touch: normal  Vibration normal.   Right arm vibration: normal  Left arm vibration: normal  Right leg vibration: normal  Left leg vibration: normal  Proprioception normal.   Right arm proprioception: normal  Left arm proprioception: normal  Right leg proprioception: normal  Left leg proprioception: normal  Pinprick normal.   Right arm pinprick: normal  Left arm pinprick: normal  Right leg pinprick: normal  Left leg pinprick: normal  Graphesthesia: normal  Romberg: negative  Stereognosis: normal    GAIT AND COORDINATION      Coordination   Finger to nose coordination: normal  Heel to shin coordination: normal  Tandem walking coordination: abnormal    Tremor   Resting tremor: absent  Intention tremor: absent  Action tremor: absent       Using cane to assist with ambulation        Physical Exam  Vitals and nursing note reviewed.   Constitutional:       Appearance: Normal appearance.   HENT:      Head: Normocephalic.   Eyes:      Extraocular Movements: Extraocular movements intact and EOM normal.      Pupils: Pupils are equal, round, and reactive to light.   Cardiovascular:      Rate and Rhythm: Normal rate and regular rhythm.   Pulmonary:      Effort: Pulmonary effort is normal.      Breath sounds: Normal breath sounds.   Musculoskeletal:         General: No swelling or tenderness. Normal range of motion.      Cervical back: Normal range of motion and neck supple.      Right lower leg: No edema.      Left lower leg: No edema.   Skin:     General: Skin is warm and dry.      Coloration: Skin is not jaundiced.      Findings: No rash.   Neurological:      General: No focal deficit present.      Mental Status: She is alert and oriented to person, place, and time.      GCS: GCS eye  subscore is 4. GCS verbal subscore is 5. GCS motor subscore is 6.      Cranial Nerves: No cranial nerve deficit.      Sensory: No sensory deficit.      Motor: Motor function is intact. No weakness.      Coordination: Coordination is intact. Coordination normal. Finger-Nose-Finger Test, Heel to Shin Test and Romberg Test normal.      Gait: Gait abnormal and tandem walk abnormal.      Deep Tendon Reflexes: Reflexes normal.      Reflex Scores:       Tricep reflexes are 2+ on the right side and 2+ on the left side.       Bicep reflexes are 2+ on the right side and 2+ on the left side.       Brachioradialis reflexes are 2+ on the right side and 2+ on the left side.       Patellar reflexes are 2+ on the right side and 2+ on the left side.       Achilles reflexes are 2+ on the right side and 2+ on the left side.  Psychiatric:         Mood and Affect: Mood normal.         Speech: Speech normal.         Behavior: Behavior normal.        Assessment:     Problem List Items Addressed This Visit    None                   Primary Diagnosis and ICD10  No primary diagnosis found.    Plan:     There are no Patient Instructions on file for this visit.    There are no discontinued medications.            Requested Prescriptions      No prescriptions requested or ordered in this encounter       No orders of the defined types were placed in this encounter.

## 2024-08-09 DIAGNOSIS — Z71.89 COMPLEX CARE COORDINATION: ICD-10-CM

## 2024-10-24 ENCOUNTER — OFFICE VISIT (OUTPATIENT)
Dept: CARDIOLOGY | Facility: CLINIC | Age: 84
End: 2024-10-24
Payer: MEDICARE

## 2024-10-24 ENCOUNTER — OFFICE VISIT (OUTPATIENT)
Dept: FAMILY MEDICINE | Facility: CLINIC | Age: 84
End: 2024-10-24
Payer: MEDICARE

## 2024-10-24 ENCOUNTER — APPOINTMENT (OUTPATIENT)
Dept: RADIOLOGY | Facility: CLINIC | Age: 84
End: 2024-10-24
Attending: INTERNAL MEDICINE
Payer: MEDICARE

## 2024-10-24 VITALS
HEART RATE: 110 BPM | BODY MASS INDEX: 27.88 KG/M2 | TEMPERATURE: 98 F | OXYGEN SATURATION: 97 % | SYSTOLIC BLOOD PRESSURE: 109 MMHG | HEIGHT: 60 IN | WEIGHT: 142 LBS | DIASTOLIC BLOOD PRESSURE: 73 MMHG | RESPIRATION RATE: 17 BRPM

## 2024-10-24 VITALS
HEART RATE: 118 BPM | OXYGEN SATURATION: 94 % | SYSTOLIC BLOOD PRESSURE: 110 MMHG | HEIGHT: 60 IN | BODY MASS INDEX: 27.73 KG/M2 | DIASTOLIC BLOOD PRESSURE: 72 MMHG

## 2024-10-24 DIAGNOSIS — R42 VERTIGO: ICD-10-CM

## 2024-10-24 DIAGNOSIS — R00.0 TACHYCARDIA: Primary | ICD-10-CM

## 2024-10-24 DIAGNOSIS — I48.0 PAROXYSMAL ATRIAL FIBRILLATION: ICD-10-CM

## 2024-10-24 DIAGNOSIS — I35.8 AORTIC HEART MURMUR: Chronic | ICD-10-CM

## 2024-10-24 DIAGNOSIS — E11.9 TYPE 2 DIABETES MELLITUS WITHOUT COMPLICATION, WITHOUT LONG-TERM CURRENT USE OF INSULIN: ICD-10-CM

## 2024-10-24 DIAGNOSIS — I10 ESSENTIAL (PRIMARY) HYPERTENSION: Primary | ICD-10-CM

## 2024-10-24 DIAGNOSIS — I10 HYPERTENSION, UNSPECIFIED TYPE: ICD-10-CM

## 2024-10-24 DIAGNOSIS — R00.0 TACHYCARDIA: ICD-10-CM

## 2024-10-24 LAB
ANION GAP SERPL CALCULATED.3IONS-SCNC: 11 MMOL/L (ref 7–16)
BASOPHILS # BLD AUTO: 0.03 K/UL (ref 0–0.2)
BASOPHILS NFR BLD AUTO: 0.6 % (ref 0–1)
BUN SERPL-MCNC: 33 MG/DL (ref 7–18)
BUN/CREAT SERPL: 21 (ref 6–20)
CALCIUM SERPL-MCNC: 8.9 MG/DL (ref 8.5–10.1)
CHLORIDE SERPL-SCNC: 109 MMOL/L (ref 98–107)
CO2 SERPL-SCNC: 25 MMOL/L (ref 21–32)
CREAT SERPL-MCNC: 1.56 MG/DL (ref 0.55–1.02)
DIFFERENTIAL METHOD BLD: ABNORMAL
EGFR (NO RACE VARIABLE) (RUSH/TITUS): 33 ML/MIN/1.73M2
EKG 12-LEAD: NORMAL
EOSINOPHIL # BLD AUTO: 0.19 K/UL (ref 0–0.5)
EOSINOPHIL NFR BLD AUTO: 4 % (ref 1–4)
ERYTHROCYTE [DISTWIDTH] IN BLOOD BY AUTOMATED COUNT: 12.7 % (ref 11.5–14.5)
EST. AVERAGE GLUCOSE BLD GHB EST-MCNC: 120 MG/DL
GLUCOSE SERPL-MCNC: 123 MG/DL (ref 74–106)
HBA1C MFR BLD HPLC: 5.8 % (ref 4.5–6.6)
HCT VFR BLD AUTO: 31.2 % (ref 38–47)
HEART RATE: 116
HGB BLD-MCNC: 9.7 G/DL (ref 12–16)
IMM GRANULOCYTES # BLD AUTO: 0.02 K/UL (ref 0–0.04)
IMM GRANULOCYTES NFR BLD: 0.4 % (ref 0–0.4)
LYMPHOCYTES # BLD AUTO: 1.35 K/UL (ref 1–4.8)
LYMPHOCYTES NFR BLD AUTO: 28.4 % (ref 27–41)
Lab: NORMAL
MAGNESIUM SERPL-MCNC: 2 MG/DL (ref 1.7–2.3)
MCH RBC QN AUTO: 30.4 PG (ref 27–31)
MCHC RBC AUTO-ENTMCNC: 31.1 G/DL (ref 32–36)
MCV RBC AUTO: 97.8 FL (ref 80–96)
MONOCYTES # BLD AUTO: 0.34 K/UL (ref 0–0.8)
MONOCYTES NFR BLD AUTO: 7.2 % (ref 2–6)
MPC BLD CALC-MCNC: 9.6 FL (ref 9.4–12.4)
NEUTROPHILS # BLD AUTO: 2.82 K/UL (ref 1.8–7.7)
NEUTROPHILS NFR BLD AUTO: 59.4 % (ref 53–65)
NRBC # BLD AUTO: 0 X10E3/UL
NRBC, AUTO (.00): 0 %
PLATELET # BLD AUTO: 231 K/UL (ref 150–400)
POTASSIUM SERPL-SCNC: 4.3 MMOL/L (ref 3.5–5.1)
PR INTERVAL: 103
PRT AXES: NORMAL
QRS DURATION: 84
QT/QTC: NORMAL
RBC # BLD AUTO: 3.19 M/UL (ref 4.2–5.4)
SODIUM SERPL-SCNC: 141 MMOL/L (ref 136–145)
VENTRICULAR RATE: NORMAL
WBC # BLD AUTO: 4.75 K/UL (ref 4.5–11)

## 2024-10-24 PROCEDURE — 80048 BASIC METABOLIC PNL TOTAL CA: CPT | Mod: ,,, | Performed by: CLINICAL MEDICAL LABORATORY

## 2024-10-24 PROCEDURE — 83036 HEMOGLOBIN GLYCOSYLATED A1C: CPT | Mod: ,,, | Performed by: CLINICAL MEDICAL LABORATORY

## 2024-10-24 PROCEDURE — 71046 X-RAY EXAM CHEST 2 VIEWS: CPT | Mod: TC,RHCUB | Performed by: INTERNAL MEDICINE

## 2024-10-24 PROCEDURE — 93005 ELECTROCARDIOGRAM TRACING: CPT | Mod: RHCUB | Performed by: INTERNAL MEDICINE

## 2024-10-24 PROCEDURE — 99214 OFFICE O/P EST MOD 30 MIN: CPT | Mod: PBBFAC | Performed by: REGISTERED NURSE

## 2024-10-24 PROCEDURE — 99212 OFFICE O/P EST SF 10 MIN: CPT | Mod: S$PBB,,, | Performed by: REGISTERED NURSE

## 2024-10-24 PROCEDURE — 85025 COMPLETE CBC W/AUTO DIFF WBC: CPT | Mod: ,,, | Performed by: CLINICAL MEDICAL LABORATORY

## 2024-10-24 PROCEDURE — 99214 OFFICE O/P EST MOD 30 MIN: CPT | Mod: ,,, | Performed by: INTERNAL MEDICINE

## 2024-10-24 PROCEDURE — 83735 ASSAY OF MAGNESIUM: CPT | Mod: ,,, | Performed by: CLINICAL MEDICAL LABORATORY

## 2024-10-24 PROCEDURE — 99999 PR PBB SHADOW E&M-EST. PATIENT-LVL IV: CPT | Mod: PBBFAC,,, | Performed by: REGISTERED NURSE

## 2024-10-24 PROCEDURE — 93010 ELECTROCARDIOGRAM REPORT: CPT | Mod: ,,, | Performed by: INTERNAL MEDICINE

## 2024-10-24 RX ORDER — MECLIZINE HCL 25MG 25 MG/1
25 TABLET, CHEWABLE ORAL EVERY 12 HOURS PRN
Qty: 90 TABLET | Refills: 1 | Status: SHIPPED | OUTPATIENT
Start: 2024-10-24

## 2024-10-24 RX ORDER — PIOGLITAZONEHYDROCHLORIDE 30 MG/1
30 TABLET ORAL DAILY
Qty: 90 TABLET | Refills: 1 | Status: SHIPPED | OUTPATIENT
Start: 2024-10-24

## 2024-10-24 RX ORDER — AMLODIPINE BESYLATE 5 MG/1
5 TABLET ORAL DAILY
Qty: 90 TABLET | Refills: 1 | Status: SHIPPED | OUTPATIENT
Start: 2024-10-24

## 2024-10-24 RX ORDER — NAPROXEN 375 MG/1
TABLET ORAL
Qty: 30 TABLET | Refills: 0 | Status: SHIPPED | OUTPATIENT
Start: 2024-10-24 | End: 2024-10-31 | Stop reason: SDUPTHER

## 2024-10-24 RX ORDER — OMEPRAZOLE 20 MG/1
20 CAPSULE, DELAYED RELEASE ORAL DAILY
Qty: 90 CAPSULE | Refills: 1 | Status: SHIPPED | OUTPATIENT
Start: 2024-10-24

## 2024-10-24 RX ORDER — NAPROXEN 375 MG/1
TABLET ORAL
Qty: 30 TABLET | Refills: 0 | OUTPATIENT
Start: 2024-10-24

## 2024-10-24 RX ORDER — METOPROLOL SUCCINATE 100 MG/1
100 TABLET, EXTENDED RELEASE ORAL DAILY
Qty: 90 TABLET | Refills: 3 | Status: SHIPPED | OUTPATIENT
Start: 2024-10-24 | End: 2025-10-24

## 2024-10-24 RX ORDER — ATORVASTATIN CALCIUM 20 MG/1
20 TABLET, FILM COATED ORAL DAILY
Qty: 90 TABLET | Refills: 1 | Status: SHIPPED | OUTPATIENT
Start: 2024-10-24

## 2024-10-24 RX ORDER — FERROUS SULFATE 325(65) MG
325 TABLET ORAL DAILY
Qty: 180 TABLET | Refills: 1 | Status: SHIPPED | OUTPATIENT
Start: 2024-10-24

## 2024-10-24 RX ORDER — LEVETIRACETAM 500 MG/1
500 TABLET ORAL 2 TIMES DAILY
Qty: 120 TABLET | Refills: 1 | Status: SHIPPED | OUTPATIENT
Start: 2024-10-24 | End: 2024-10-31 | Stop reason: SDUPTHER

## 2024-10-24 NOTE — PROGRESS NOTES
PCP: Farhad Culver MD    Referring Provider:     Subjective:   Zbigniew Ventura is a 84 y.o. female with hx of NIDDM, HTN,  HLD, CVA, seizures, and paroxysmal atrial fibrillation/ flutter who presents for follow up.  Denies chest pain.  C/o occasional palpitations.     10/24/24 No cardiac complaints today. Denies palpitations, SOB, orthopnea, LE edema.      10/17/23--Zbigniew Ventura is a 84 y.o. female with hx of NIDDM, HTN,  HLD, CVA, seizures, and paroxysmal atrial fibrillation who presents for chest pain.  Referred by Dr. Culver.  Patient denies chest pain, c/o occasional palpitations.       Fhx:No family history on file.  Shx:   Social History     Socioeconomic History    Marital status:    Occupational History    Occupation: disabled   Tobacco Use    Smoking status: Never     Passive exposure: Never    Smokeless tobacco: Never   Substance and Sexual Activity    Alcohol use: Never    Drug use: Never    Sexual activity: Not Currently     Social Drivers of Health     Financial Resource Strain: Medium Risk (11/10/2023)    Overall Financial Resource Strain (CARDIA)     Difficulty of Paying Living Expenses: Somewhat hard   Food Insecurity: Food Insecurity Present (11/10/2023)    Hunger Vital Sign     Worried About Running Out of Food in the Last Year: Often true     Ran Out of Food in the Last Year: Often true   Transportation Needs: Unmet Transportation Needs (11/10/2023)    PRAPARE - Transportation     Lack of Transportation (Medical): No     Lack of Transportation (Non-Medical): Yes   Physical Activity: Inactive (11/10/2023)    Exercise Vital Sign     Days of Exercise per Week: 0 days     Minutes of Exercise per Session: 0 min   Stress: No Stress Concern Present (11/10/2023)    [place] [employer] - Occupational Stress Questionnaire     Feeling of Stress : Only a little   Housing Stability: High Risk (11/10/2023)    Housing Stability Vital Sign     Unable to Pay for Housing in the Last Year:  Yes     Number of Places Lived in the Last Year: 1     Unstable Housing in the Last Year: No     01/24/24 - Sinus tachycardia  4/17/24--sinus tachycardia, 116 bpm  10/17/23--NSR, 74 bpm  10/11/22--NSR, nonspecific T wave abn, 78 bpm.      Zio:  12/15/23-12/27/23--Atral flutter/ fibrillation.        Echo    10/20/23--Interpretation Summary    Left Ventricle: The left ventricle is normal in size. Normal wall thickness. There is normal systolic function. Ejection fraction by visual approximation is 65%.    Left Atrium: Left atrium is mildly dilated.23.82cm2    Right Ventricle: Normal right ventricular cavity size. Systolic function is normal.    Aortic Valve: The aortic valve is a trileaflet valve. There is mild aortic valve sclerosis.    Mitral Valve: There is mild regurgitation.    Tricuspid Valve: There is mild regurgitation.    IVC/SVC: Normal venous pressure at 3 mmHg.    10/6/22--Interpretation Summary  · The left ventricle is normal in size with low normal systolic function.  · The estimated ejection fraction is 50%.  · Normal left ventricular diastolic function.  · Normal right ventricular size.  · Mild mitral regurgitation.  · Mild tricuspid regurgitation.  · Normal central venous pressure (3 mmHg).  · The estimated PA systolic pressure is 30 mmHg.  · There is left ventricular global hypokinesis.      Lab Results   Component Value Date     04/17/2024    K 4.9 04/17/2024     04/17/2024    CO2 23 04/17/2024    BUN 38 (H) 04/17/2024    CREATININE 1.65 (H) 04/17/2024    CALCIUM 9.0 04/17/2024    ANIONGAP 12 04/17/2024    ESTGFRAFRICA 43 06/29/2020    EGFRNONAA 54 (L) 06/21/2022       Lab Results   Component Value Date    CHOL 155 12/14/2023    CHOL 166 11/10/2023    CHOL 152 11/07/2022     Lab Results   Component Value Date    HDL 74 (H) 12/14/2023    HDL 73 (H) 11/10/2023    HDL 47 11/07/2022     Lab Results   Component Value Date    LDLCALC 64 12/14/2023    LDLCALC 62 11/10/2023    LDLCALC 66  11/07/2022     Lab Results   Component Value Date    TRIG 84 12/14/2023    TRIG 154 (H) 11/10/2023    TRIG 193 (H) 11/07/2022     Lab Results   Component Value Date    CHOLHDL 2.1 12/14/2023    CHOLHDL 2.3 11/10/2023    CHOLHDL 3.2 11/07/2022       Lab Results   Component Value Date    WBC 5.80 04/17/2024    HGB 9.8 (L) 04/17/2024    HCT 31.7 (L) 04/17/2024    MCV 94.6 04/17/2024     04/17/2024           Current Outpatient Medications:     apixaban (ELIQUIS) 5 mg Tab, TAKE ONE (1) TABLET BY MOUTH TWICE DAILY AS DIRECTED, Disp: 60 tablet, Rfl: 11    amLODIPine (NORVASC) 5 MG tablet, Take 1 tablet (5 mg total) by mouth once daily., Disp: 90 tablet, Rfl: 1    atorvastatin (LIPITOR) 20 MG tablet, Take 1 tablet (20 mg total) by mouth once daily., Disp: 90 tablet, Rfl: 1    ferrous sulfate (FEROSUL) 325 mg (65 mg iron) Tab tablet, Take 1 tablet (325 mg total) by mouth once daily., Disp: 180 tablet, Rfl: 1    furosemide (LASIX) 40 MG tablet, Take 1 tablet (40 mg total) by mouth once daily. (Patient not taking: Reported on 4/17/2024), Disp: 90 tablet, Rfl: 0    levETIRAcetam (KEPPRA) 500 MG Tab, Take 1 tablet (500 mg total) by mouth 2 (two) times daily., Disp: 120 tablet, Rfl: 1    meclizine (ANTIVERT) 25 MG tablet, Take 1 tablet (25 mg total) by mouth every 12 (twelve) hours as needed for Dizziness or Nausea., Disp: 90 tablet, Rfl: 1    metoprolol succinate (TOPROL-XL) 100 MG 24 hr tablet, Take 1 tablet (100 mg total) by mouth once daily., Disp: 90 tablet, Rfl: 3    naproxen (NAPROSYN) 375 MG tablet, TAKE ONE (1) TABLET BY MOUTH ONCE A DAY WITH FOOD AS NEEDED FOR PAIN, Disp: 30 tablet, Rfl: 0    omeprazole (PRILOSEC) 20 MG capsule, Take 1 capsule (20 mg total) by mouth once daily., Disp: 90 capsule, Rfl: 1    pioglitazone (ACTOS) 30 MG tablet, Take 1 tablet (30 mg total) by mouth once daily., Disp: 90 tablet, Rfl: 1    potassium chloride (MICRO-K) 10 MEQ CpSR, Take 1 capsule (10 mEq total) by mouth once daily.  (Patient not taking: Reported on 4/17/2024), Disp: 90 capsule, Rfl: 0  Medications reviewed and reconciled.    Review of Systems   Respiratory:  Negative for shortness of breath.    Cardiovascular:  Negative for chest pain, palpitations and leg swelling.   Neurological:  Negative for dizziness and loss of consciousness.   All other systems reviewed and are negative.          Objective:   /72 (BP Location: Left arm, Patient Position: Sitting)   Pulse (!) 118   Ht 5' (1.524 m)   LMP  (LMP Unknown)   SpO2 (!) 94%   BMI 27.73 kg/m²     Physical Exam  Vitals reviewed.   Constitutional:       General: She is not in acute distress.     Appearance: Normal appearance.   HENT:      Head: Normocephalic and atraumatic.   Neck:      Vascular: No carotid bruit or JVD.      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      Pulses: Normal pulses.           Radial pulses are 2+ on the right side and 2+ on the left side.        Dorsalis pedis pulses are 2+ on the right side.      Comments: I-II/ VI ZACH  Pulmonary:      Effort: Pulmonary effort is normal.      Breath sounds: Normal breath sounds.   Abdominal:      General: There is no distension.      Palpations: Abdomen is soft.      Tenderness: There is no abdominal tenderness.   Musculoskeletal:         General: Normal range of motion.      Right lower leg: No edema.      Left lower leg: No edema.   Skin:     General: Skin is warm and dry.      Capillary Refill: Capillary refill takes less than 2 seconds.   Neurological:      General: No focal deficit present.      Mental Status: She is alert and oriented to person, place, and time.   Psychiatric:         Mood and Affect: Mood normal.         Behavior: Behavior normal.           Assessment:     1. SOB (shortness of breath)  EKG 12-lead    EKG 12-lead      2. Hypertension, unspecified type  EKG 12-lead    EKG 12-lead      3. Paroxysmal  atrial fibrillation        4. Essential (primary) hypertension        5. Aortic heart murmur              Plan:   HTN  Well controlled on Norvasc, Toprol, Lasix    PAF  SR on EKG rate controlled  Continue eliquis, toprol    Aortic heart murmur  Asymptomatic  Echo (2024) demonstrates moderate aortic valve sclerosis.

## 2024-10-31 ENCOUNTER — OFFICE VISIT (OUTPATIENT)
Dept: NEUROLOGY | Facility: CLINIC | Age: 84
End: 2024-10-31
Payer: MEDICARE

## 2024-10-31 VITALS
RESPIRATION RATE: 18 BRPM | HEART RATE: 123 BPM | BODY MASS INDEX: 27.88 KG/M2 | HEIGHT: 60 IN | OXYGEN SATURATION: 98 % | DIASTOLIC BLOOD PRESSURE: 67 MMHG | SYSTOLIC BLOOD PRESSURE: 120 MMHG | WEIGHT: 142 LBS

## 2024-10-31 DIAGNOSIS — R42 VERTIGO: ICD-10-CM

## 2024-10-31 DIAGNOSIS — M54.50 CHRONIC MIDLINE LOW BACK PAIN WITHOUT SCIATICA: ICD-10-CM

## 2024-10-31 DIAGNOSIS — G40.909 NONINTRACTABLE EPILEPSY WITHOUT STATUS EPILEPTICUS, UNSPECIFIED EPILEPSY TYPE: Primary | ICD-10-CM

## 2024-10-31 DIAGNOSIS — I63.9 CEREBROVASCULAR ACCIDENT (CVA), UNSPECIFIED MECHANISM: ICD-10-CM

## 2024-10-31 DIAGNOSIS — G89.29 CHRONIC MIDLINE LOW BACK PAIN WITHOUT SCIATICA: ICD-10-CM

## 2024-10-31 PROCEDURE — 99213 OFFICE O/P EST LOW 20 MIN: CPT | Mod: S$PBB,,, | Performed by: NURSE PRACTITIONER

## 2024-10-31 PROCEDURE — 99214 OFFICE O/P EST MOD 30 MIN: CPT | Mod: PBBFAC | Performed by: NURSE PRACTITIONER

## 2024-10-31 PROCEDURE — 99999 PR PBB SHADOW E&M-EST. PATIENT-LVL IV: CPT | Mod: PBBFAC,,, | Performed by: NURSE PRACTITIONER

## 2024-10-31 RX ORDER — NAPROXEN 375 MG/1
TABLET ORAL
Qty: 30 TABLET | Refills: 0 | Status: SHIPPED | OUTPATIENT
Start: 2024-10-31

## 2024-10-31 RX ORDER — LEVETIRACETAM 500 MG/1
500 TABLET ORAL 2 TIMES DAILY
Qty: 180 TABLET | Refills: 3 | Status: SHIPPED | OUTPATIENT
Start: 2024-10-31

## 2024-11-21 ENCOUNTER — OFFICE VISIT (OUTPATIENT)
Dept: FAMILY MEDICINE | Facility: CLINIC | Age: 84
End: 2024-11-21
Payer: MEDICARE

## 2024-11-21 VITALS
TEMPERATURE: 98 F | OXYGEN SATURATION: 98 % | HEART RATE: 109 BPM | DIASTOLIC BLOOD PRESSURE: 66 MMHG | RESPIRATION RATE: 18 BRPM | BODY MASS INDEX: 28.04 KG/M2 | WEIGHT: 142.81 LBS | HEIGHT: 60 IN | SYSTOLIC BLOOD PRESSURE: 108 MMHG

## 2024-11-21 DIAGNOSIS — E11.9 TYPE 2 DIABETES MELLITUS WITHOUT COMPLICATION, WITHOUT LONG-TERM CURRENT USE OF INSULIN: Primary | ICD-10-CM

## 2024-11-21 LAB
ANION GAP SERPL CALCULATED.3IONS-SCNC: 14 MMOL/L (ref 7–16)
BUN SERPL-MCNC: 24 MG/DL (ref 10–20)
BUN/CREAT SERPL: 21 (ref 6–20)
CALCIUM SERPL-MCNC: 8.7 MG/DL (ref 8.4–10.2)
CHLORIDE SERPL-SCNC: 104 MMOL/L (ref 98–107)
CO2 SERPL-SCNC: 21 MMOL/L (ref 23–31)
CREAT SERPL-MCNC: 1.16 MG/DL (ref 0.55–1.02)
CREAT UR-MCNC: 157 MG/DL (ref 15–325)
EGFR (NO RACE VARIABLE) (RUSH/TITUS): 47 ML/MIN/1.73M2
GLUCOSE SERPL-MCNC: 84 MG/DL (ref 82–115)
MICROALBUMIN UR-MCNC: <5 MG/DL
MICROALBUMIN/CREAT RATIO PNL UR: ABNORMAL
POTASSIUM SERPL-SCNC: 4.9 MMOL/L (ref 3.5–5.1)
PROT UR-MCNC: 13.3 MG/DL
SODIUM SERPL-SCNC: 134 MMOL/L (ref 136–145)

## 2024-11-21 PROCEDURE — 82570 ASSAY OF URINE CREATININE: CPT | Mod: ,,, | Performed by: CLINICAL MEDICAL LABORATORY

## 2024-11-21 PROCEDURE — 99213 OFFICE O/P EST LOW 20 MIN: CPT | Mod: ,,, | Performed by: INTERNAL MEDICINE

## 2024-11-21 PROCEDURE — 82043 UR ALBUMIN QUANTITATIVE: CPT | Mod: ,,, | Performed by: CLINICAL MEDICAL LABORATORY

## 2024-11-21 PROCEDURE — 84156 ASSAY OF PROTEIN URINE: CPT | Mod: ,,, | Performed by: CLINICAL MEDICAL LABORATORY

## 2024-11-21 PROCEDURE — 80048 BASIC METABOLIC PNL TOTAL CA: CPT | Mod: ,,, | Performed by: CLINICAL MEDICAL LABORATORY

## 2024-11-21 NOTE — PROGRESS NOTES
Subjective:       Patient ID: Zbigniew Ventura is a 84 y.o. female.    Chief Complaint: Tachycardia (1 month fu ) and Health Maintenance (Pt will have diabetes urine screening done today )    HPI  .  89 very nice lady presents with a history of Afib and seizure disorder she presents stating that she feels fine she is in no acute distress.  Also examined her feet she wanted her feet examined.  She stated she feels much much better since last visit.    Current Medications:    Current Outpatient Medications:     amLODIPine (NORVASC) 5 MG tablet, Take 1 tablet (5 mg total) by mouth once daily., Disp: 90 tablet, Rfl: 1    apixaban (ELIQUIS) 5 mg Tab, TAKE ONE (1) TABLET BY MOUTH TWICE DAILY AS DIRECTED, Disp: 60 tablet, Rfl: 11    atorvastatin (LIPITOR) 20 MG tablet, Take 1 tablet (20 mg total) by mouth once daily., Disp: 90 tablet, Rfl: 1    ferrous sulfate (FEROSUL) 325 mg (65 mg iron) Tab tablet, Take 1 tablet (325 mg total) by mouth once daily., Disp: 180 tablet, Rfl: 1    levETIRAcetam (KEPPRA) 500 MG Tab, Take 1 tablet (500 mg total) by mouth 2 (two) times daily., Disp: 180 tablet, Rfl: 3    meclizine (ANTIVERT) 25 MG tablet, Take 1 tablet (25 mg total) by mouth every 12 (twelve) hours as needed for Dizziness or Nausea., Disp: 90 tablet, Rfl: 1    metoprolol succinate (TOPROL-XL) 100 MG 24 hr tablet, Take 1 tablet (100 mg total) by mouth once daily., Disp: 90 tablet, Rfl: 3    naproxen (NAPROSYN) 375 MG tablet, TAKE ONE (1) TABLET BY MOUTH ONCE A DAY WITH FOOD AS NEEDED FOR PAIN, Disp: 30 tablet, Rfl: 0    omeprazole (PRILOSEC) 20 MG capsule, Take 1 capsule (20 mg total) by mouth once daily., Disp: 90 capsule, Rfl: 1    pioglitazone (ACTOS) 30 MG tablet, Take 1 tablet (30 mg total) by mouth once daily., Disp: 90 tablet, Rfl: 1    potassium chloride (MICRO-K) 10 MEQ CpSR, Take 1 capsule (10 mEq total) by mouth once daily., Disp: 90 capsule, Rfl: 0    furosemide (LASIX) 40 MG tablet, Take 1 tablet (40 mg total) by mouth  once daily. (Patient not taking: Reported on 4/17/2024), Disp: 90 tablet, Rfl: 0           ROS  Twelve point system reviewed, unremarkable except for stated above in HPI.        Objective:         Vitals:    11/21/24 1002   BP: 108/66   BP Location: Left arm   Patient Position: Sitting   Pulse: 109   Resp: 18   Temp: 97.7 °F (36.5 °C)   TempSrc: Tympanic   SpO2: 98%   Weight: 64.8 kg (142 lb 12.8 oz)   Height: 5' (1.524 m)        Physical Exam     Patient is awake alert oriented person place and  Lungs are clear to auscultation bilaterally no crackles or wheezes   Cardiovascular S1-S2 regular rate and rhythm no murmurs rubs or gallops   Abdomen is soft positive bowel sounds nontender, extremities no clubbing cyanosis edema  Neuro no focal neurological deficits  Skin warm and dry.     Last Labs:     Office Visit on 11/21/2024   Component Date Value    Protein, Urine 11/21/2024 13.3     Creatinine, Urine 11/21/2024 157     Microalbumin 11/21/2024 <5.0 (H)     Microalbumin/Creatinine * 11/21/2024      Sodium 11/21/2024 134 (L)     Potassium 11/21/2024 4.9     Chloride 11/21/2024 104     CO2 11/21/2024 21 (L)     Anion Gap 11/21/2024 14     Glucose 11/21/2024 84     BUN 11/21/2024 24 (H)     Creatinine 11/21/2024 1.16 (H)     BUN/Creatinine Ratio 11/21/2024 21 (H)     Calcium 11/21/2024 8.7     eGFR 11/21/2024 47 (L)    Office Visit on 10/24/2024   Component Date Value    QRS Duration 10/24/2024 72     [facility] QTC Calculation 10/24/2024 474    Office Visit on 10/24/2024   Component Date Value    MA Interval 10/24/2024 103     QRS Duration 10/24/2024 84     QT/QTc 10/24/2024 325/452     PRT Axes 10/24/2024 88/48/42     Heart Rate 10/24/2024 116     Results 10/24/2024      Hemoglobin A1C 10/24/2024 5.8     Estimated Average Glucose 10/24/2024 120     Sodium 10/24/2024 141     Potassium 10/24/2024 4.3     Chloride 10/24/2024 109 (H)     CO2 10/24/2024 25     Anion Gap 10/24/2024 11     Glucose 10/24/2024 123 (H)     BUN  10/24/2024 33 (H)     Creatinine 10/24/2024 1.56 (H)     BUN/Creatinine Ratio 10/24/2024 21 (H)     Calcium 10/24/2024 8.9     eGFR 10/24/2024 33 (L)     Magnesium 10/24/2024 2.0     WBC 10/24/2024 4.75     RBC 10/24/2024 3.19 (L)     Hemoglobin 10/24/2024 9.7 (L)     Hematocrit 10/24/2024 31.2 (L)     MCV 10/24/2024 97.8 (H)     MCH 10/24/2024 30.4     MCHC 10/24/2024 31.1 (L)     RDW 10/24/2024 12.7     Platelet Count 10/24/2024 231     MPV 10/24/2024 9.6     Neutrophils % 10/24/2024 59.4     Lymphocytes % 10/24/2024 28.4     Monocytes % 10/24/2024 7.2 (H)     Eosinophils % 10/24/2024 4.0     Basophils % 10/24/2024 0.6     Immature Granulocytes % 10/24/2024 0.4     nRBC, Auto 10/24/2024 0.0     Neutrophils, Abs 10/24/2024 2.82     Lymphocytes, Absolute 10/24/2024 1.35     Monocytes, Absolute 10/24/2024 0.34     Eosinophils, Absolute 10/24/2024 0.19     Basophils, Absolute 10/24/2024 0.03     Immature Granulocytes, A* 10/24/2024 0.02     nRBC, Absolute 10/24/2024 0.00     Diff Type 10/24/2024 Auto        Last Imaging:  X-Ray Chest PA And Lateral  Narrative: EXAMINATION:  XR CHEST PA AND LATERAL    CLINICAL HISTORY:  Tachycardia, unspecified    TECHNIQUE:  PA and lateral views of the chest were performed.    COMPARISON:  04/17/2024.    FINDINGS:  The lungs are clear.  No focal consolidation.  Heart size is enlarged.  Mediastinal contours unremarkable.    Bony thorax intact.  Impression: No acute chest disease.    Electronically signed by: Ethan Lott  Date:    10/24/2024  Time:    12:16         **Labs and x-rays personally reviewed by me    ** reviewed           Assessment & Plan:       1. Type 2 diabetes mellitus without complication, without long-term current use of insulin  -     Protein, Random Urine; Future; Expected date: 11/21/2024  -     Microalbumin/Creatinine Ratio, Urine; Future; Expected date: 11/21/2024  -     Basic Metabolic Panel; Future; Expected date: 11/21/2024            Fahrad Culver,  MD

## 2024-12-12 RX ORDER — PIOGLITAZONEHYDROCHLORIDE 30 MG/1
30 TABLET ORAL DAILY
Qty: 90 TABLET | Refills: 1 | OUTPATIENT
Start: 2024-12-12

## 2025-02-06 ENCOUNTER — OFFICE VISIT (OUTPATIENT)
Dept: NEUROLOGY | Facility: CLINIC | Age: 85
End: 2025-02-06
Payer: MEDICARE

## 2025-02-06 VITALS
SYSTOLIC BLOOD PRESSURE: 103 MMHG | OXYGEN SATURATION: 99 % | HEART RATE: 117 BPM | HEIGHT: 60 IN | BODY MASS INDEX: 28.79 KG/M2 | RESPIRATION RATE: 18 BRPM | WEIGHT: 146.63 LBS | DIASTOLIC BLOOD PRESSURE: 71 MMHG

## 2025-02-06 DIAGNOSIS — R42 VERTIGO: ICD-10-CM

## 2025-02-06 DIAGNOSIS — G40.909 NONINTRACTABLE EPILEPSY WITHOUT STATUS EPILEPTICUS, UNSPECIFIED EPILEPSY TYPE: Primary | ICD-10-CM

## 2025-02-06 DIAGNOSIS — I63.9 CEREBROVASCULAR ACCIDENT (CVA), UNSPECIFIED MECHANISM: ICD-10-CM

## 2025-02-06 PROCEDURE — 99212 OFFICE O/P EST SF 10 MIN: CPT | Mod: S$PBB,,, | Performed by: NURSE PRACTITIONER

## 2025-02-06 PROCEDURE — 99214 OFFICE O/P EST MOD 30 MIN: CPT | Mod: PBBFAC | Performed by: NURSE PRACTITIONER

## 2025-02-06 PROCEDURE — 99999 PR PBB SHADOW E&M-EST. PATIENT-LVL IV: CPT | Mod: PBBFAC,,, | Performed by: NURSE PRACTITIONER

## 2025-02-06 RX ORDER — MECLIZINE HCL 25MG 25 MG/1
25 TABLET, CHEWABLE ORAL EVERY 12 HOURS PRN
Qty: 90 TABLET | Refills: 1 | Status: SHIPPED | OUTPATIENT
Start: 2025-02-06

## 2025-02-06 NOTE — PATIENT INSTRUCTIONS
1.  Continue current medication including keppra as directed    2. Notify clinic if any seizures    3. Antivert as needed for dizziness    4. Naproxen as directed as needed for lower back pain, not more than 1 tablet per day      Seizure Precautions:  1. Take medications as directed  2. Avoid open flames and hot surfaces such as fires and grills  3. Avoid elevations such as ladders or stools  4. Avoid swimming alone  5. Make sure getting plenty of sleep, recommend 7-8 hours per day  6. Avoid alcohol and illicit drugs  7. No driving or operating heavy machinery for 6 months after last known seizure    Stroke risk modifiers:    1. Good sleep habits, recommend at least 7 hours total sleep daily  2. Continue management of blood pressure and cholesterol including medications, exercise, and good eating habits  3. Exercise as much as possible, recommend 5 days of the week for 30 minutes each day  4. Avoid smoking and alcohol  5. Go to the nearest emergency department immediately if any new or worsening weakness, speech difficulty, or numbness

## 2025-02-06 NOTE — PROGRESS NOTES
Subjective:       Patient ID: Zbigniew Ventura is a 84 y.o. female     Chief Complaint:    No chief complaint on file.         Allergies:  Patient has no known allergies.    Current Medications:    Outpatient Encounter Medications as of 2/6/2025   Medication Sig Dispense Refill    amLODIPine (NORVASC) 5 MG tablet Take 1 tablet (5 mg total) by mouth once daily. for blood pressure 90 tablet 1    apixaban (ELIQUIS) 5 mg Tab TAKE ONE (1) TABLET BY MOUTH TWICE DAILY AS DIRECTED 60 tablet 11    atorvastatin (LIPITOR) 20 MG tablet Take 1 tablet (20 mg total) by mouth once daily. 90 tablet 1    ferrous sulfate (FEROSUL) 325 mg (65 mg iron) Tab tablet Take 1 tablet (325 mg total) by mouth once daily. 180 tablet 1    levETIRAcetam (KEPPRA) 500 MG Tab Take 1 tablet (500 mg total) by mouth 2 (two) times daily. 180 tablet 3    meclizine (ANTIVERT) 25 MG tablet Take 1 tablet (25 mg total) by mouth every 12 (twelve) hours as needed for Dizziness or Nausea. 90 tablet 1    metoprolol succinate (TOPROL-XL) 100 MG 24 hr tablet Take 1 tablet (100 mg total) by mouth once daily. 90 tablet 3    naproxen (NAPROSYN) 375 MG tablet TAKE ONE (1) TABLET BY MOUTH ONCE A DAY WITH FOOD AS NEEDED FOR PAIN 30 tablet 0    omeprazole (PRILOSEC) 20 MG capsule Take 1 capsule (20 mg total) by mouth once daily. 90 capsule 1    pioglitazone (ACTOS) 30 MG tablet Take 1 tablet (30 mg total) by mouth once daily. 90 tablet 1    potassium chloride (MICRO-K) 10 MEQ CpSR Take 1 capsule (10 mEq total) by mouth once daily. 90 capsule 0    furosemide (LASIX) 40 MG tablet Take 1 tablet (40 mg total) by mouth once daily. (Patient not taking: Reported on 2/6/2025) 90 tablet 0    [DISCONTINUED] tamsulosin (FLOMAX) 0.4 mg Cap Take 1 capsule (0.4 mg total) by mouth once daily. 90 capsule 1     No facility-administered encounter medications on file as of 2/6/2025.       History of Present Illness  This Is a 84-year-old past history of diabetes and right hemispheric stroke and  seizure.    Her family in room assists with communication, her .    They deny any seizures since her last visit with us and no new CVA symptoms.  She is on triple therapy including ASA 81mg as well as keppra 500mg BID which is filled by her primary care and denies any side effects.    She does have residual left-sided weakness from stroke but it is minimal. In     They do report that she has occasional episodes of dizziness, this since her prior CVA.  Her  has let her use his prescribed meclizine which worked very well.  She is being followed by cardiology, prior seen by Dr. Alcazar.  Noted more recently with visit at the emergency department for possible atrial flutter.  They are encouraged to keep follow-up with cardiology regarding this           Review of Systems  Review of Systems   Constitutional:  Negative for diaphoresis and fever.   HENT:  Negative for congestion, hearing loss and tinnitus.    Eyes:  Negative for blurred vision, double vision, photophobia, discharge and redness.   Respiratory:  Negative for cough and shortness of breath.    Cardiovascular:  Negative for chest pain.   Gastrointestinal:  Negative for abdominal pain, nausea and vomiting.   Musculoskeletal:  Negative for back pain, joint pain, myalgias and neck pain.   Skin:  Negative for itching and rash.   Neurological:  Positive for dizziness and seizures. Negative for tremors, sensory change, speech change, focal weakness, loss of consciousness, weakness and headaches.   Psychiatric/Behavioral:  Negative for depression, hallucinations and memory loss. The patient does not have insomnia.    All other systems reviewed and are negative.     Objective:     NEUROLOGICAL EXAMINATION:     MENTAL STATUS   Oriented to person, place, and time.   Attention: normal. Concentration: normal.   Speech: speech is normal   Level of consciousness: alert  Knowledge: good and consistent with education.   Normal comprehension.     CRANIAL NERVES      CN II   Visual fields full to confrontation.   Visual acuity: normal  Right visual field deficit: none  Left visual field deficit: none     CN III, IV, VI   Pupils are equal, round, and reactive to light.  Extraocular motions are normal.   Right pupil: Size: 3 mm. Shape: regular. Reactivity: brisk. Consensual response: intact. Accommodation: intact.   Left pupil: Size: 3 mm. Shape: regular. Reactivity: brisk. Consensual response: intact. Accommodation: intact.   CN III: no CN III palsy  CN VI: no CN VI palsy  Nystagmus: none   Diplopia: none  Upgaze: normal  Downgaze: normal  Conjugate gaze: present  Vestibulo-ocular reflex: present    CN V   Facial sensation intact.   Right facial sensation deficit: none  Left facial sensation deficit: none  Right corneal reflex: normal  Left corneal reflex: normal    CN VII   Facial expression full, symmetric.   Right facial weakness: none  Left facial weakness: none  Right taste: normal  Left taste: normal    CN VIII   CN VIII normal.   Hearing: intact    CN IX, X   CN IX normal.   CN X normal.   Palate: symmetric    CN XI   CN XI normal.   Right sternocleidomastoid strength: normal  Left sternocleidomastoid strength: normal  Right trapezius strength: normal  Left trapezius strength: normal    CN XII   CN XII normal.   Tongue: not atrophic  Fasciculations: absent  Tongue deviation: none    MOTOR EXAM   Muscle bulk: normal  Overall muscle tone: normal  Right arm tone: normal  Left arm tone: normal  Right arm pronator drift: absent  Left arm pronator drift: absent  Right leg tone: normal  Left leg tone: normal    Strength   Right neck flexion: 5/5  Left neck flexion: 5/5  Right neck extension: 5/5  Left neck extension: 5/5  Right deltoid: 5/5  Left deltoid: 5/5  Right biceps: 5/5  Left biceps: 5/5  Right triceps: 5/5  Left triceps: 5/5  Right wrist flexion: 5/5  Left wrist flexion: 5/5  Right wrist extension: 5/5  Left wrist extension: 5/5  Right interossei: 5/5  Left  interossei: 5/5  Right iliopsoas: 5/5  Left iliopsoas: 5/5  Right quadriceps: 5/5  Left quadriceps: 5/5  Right hamstrin/5  Left hamstrin/5  Right anterior tibial: 5/5  Left anterior tibial: 5/5  Right posterior tibial: 5/5  Left posterior tibial: 5/5  Right gastroc: 5/5  Left gastroc: 5/5    REFLEXES     Reflexes   Right brachioradialis: 2+  Left brachioradialis: 2+  Right biceps: 2+  Left biceps: 2+  Right triceps: 2+  Left triceps: 2+  Right patellar: 2+  Left patellar: 2+  Right achilles: 2+  Left achilles: 2+  Right plantar: normal  Left plantar: normal  Right Calvin: absent  Left Calvin: absent  Right ankle clonus: absent  Left ankle clonus: absent  Right pendular knee jerk: absent  Left pendular knee jerk: absent    SENSORY EXAM   Light touch normal.   Right arm light touch: normal  Left arm light touch: normal  Right leg light touch: normal  Left leg light touch: normal  Vibration normal.   Right arm vibration: normal  Left arm vibration: normal  Right leg vibration: normal  Left leg vibration: normal  Proprioception normal.   Right arm proprioception: normal  Left arm proprioception: normal  Right leg proprioception: normal  Left leg proprioception: normal  Pinprick normal.   Right arm pinprick: normal  Left arm pinprick: normal  Right leg pinprick: normal  Left leg pinprick: normal  Graphesthesia: normal  Romberg: negative  Stereognosis: normal    GAIT AND COORDINATION      Coordination   Finger to nose coordination: normal  Heel to shin coordination: normal  Tandem walking coordination: abnormal    Tremor   Resting tremor: absent  Intention tremor: absent  Action tremor: absent       Using cane to assist with ambulation        Physical Exam  Vitals and nursing note reviewed.   Constitutional:       Appearance: Normal appearance.   HENT:      Head: Normocephalic.   Eyes:      Extraocular Movements: Extraocular movements intact and EOM normal.      Pupils: Pupils are equal, round, and reactive to  light.   Cardiovascular:      Rate and Rhythm: Normal rate and regular rhythm.   Pulmonary:      Effort: Pulmonary effort is normal.      Breath sounds: Normal breath sounds.   Musculoskeletal:         General: No swelling or tenderness. Normal range of motion.      Cervical back: Normal range of motion and neck supple.      Right lower leg: No edema.      Left lower leg: No edema.   Skin:     General: Skin is warm and dry.      Coloration: Skin is not jaundiced.      Findings: No rash.   Neurological:      General: No focal deficit present.      Mental Status: She is alert and oriented to person, place, and time.      GCS: GCS eye subscore is 4. GCS verbal subscore is 5. GCS motor subscore is 6.      Cranial Nerves: No cranial nerve deficit.      Sensory: No sensory deficit.      Motor: Motor function is intact. No weakness.      Coordination: Coordination is intact. Coordination normal. Finger-Nose-Finger Test, Heel to Shin Test and Romberg Test normal.      Gait: Gait abnormal and tandem walk abnormal.      Deep Tendon Reflexes: Reflexes normal.      Reflex Scores:       Tricep reflexes are 2+ on the right side and 2+ on the left side.       Bicep reflexes are 2+ on the right side and 2+ on the left side.       Brachioradialis reflexes are 2+ on the right side and 2+ on the left side.       Patellar reflexes are 2+ on the right side and 2+ on the left side.       Achilles reflexes are 2+ on the right side and 2+ on the left side.  Psychiatric:         Mood and Affect: Mood normal.         Speech: Speech normal.         Behavior: Behavior normal.          Assessment:     Problem List Items Addressed This Visit    None                     Primary Diagnosis and ICD10  No primary diagnosis found.    Plan:     There are no Patient Instructions on file for this visit.    There are no discontinued medications.              Requested Prescriptions      No prescriptions requested or ordered in this encounter       No  orders of the defined types were placed in this encounter.

## 2025-03-19 ENCOUNTER — OFFICE VISIT (OUTPATIENT)
Dept: FAMILY MEDICINE | Facility: CLINIC | Age: 85
End: 2025-03-19
Payer: MEDICARE

## 2025-03-19 VITALS
DIASTOLIC BLOOD PRESSURE: 75 MMHG | HEART RATE: 110 BPM | HEIGHT: 60 IN | WEIGHT: 144 LBS | OXYGEN SATURATION: 100 % | BODY MASS INDEX: 28.27 KG/M2 | RESPIRATION RATE: 17 BRPM | TEMPERATURE: 98 F | SYSTOLIC BLOOD PRESSURE: 111 MMHG

## 2025-03-19 DIAGNOSIS — K21.9 GASTROESOPHAGEAL REFLUX DISEASE, UNSPECIFIED WHETHER ESOPHAGITIS PRESENT: ICD-10-CM

## 2025-03-19 DIAGNOSIS — R56.9 SEIZURE: ICD-10-CM

## 2025-03-19 DIAGNOSIS — E78.5 DYSLIPIDEMIA: Primary | ICD-10-CM

## 2025-03-19 DIAGNOSIS — E11.9 TYPE 2 DIABETES MELLITUS WITHOUT COMPLICATION, WITHOUT LONG-TERM CURRENT USE OF INSULIN: ICD-10-CM

## 2025-03-19 DIAGNOSIS — R42 VERTIGO: ICD-10-CM

## 2025-03-19 DIAGNOSIS — I10 ESSENTIAL (PRIMARY) HYPERTENSION: ICD-10-CM

## 2025-03-19 DIAGNOSIS — D64.9 CHRONIC ANEMIA: ICD-10-CM

## 2025-03-19 PROCEDURE — 99214 OFFICE O/P EST MOD 30 MIN: CPT | Mod: ,,, | Performed by: INTERNAL MEDICINE

## 2025-03-19 RX ORDER — METOPROLOL SUCCINATE 100 MG/1
100 TABLET, EXTENDED RELEASE ORAL DAILY
Qty: 90 TABLET | Refills: 3 | Status: SHIPPED | OUTPATIENT
Start: 2025-03-19 | End: 2026-03-19

## 2025-03-19 RX ORDER — OMEPRAZOLE 20 MG/1
20 CAPSULE, DELAYED RELEASE ORAL DAILY
Qty: 90 CAPSULE | Refills: 1 | Status: SHIPPED | OUTPATIENT
Start: 2025-03-19

## 2025-03-19 RX ORDER — MECLIZINE HCL 25MG 25 MG/1
25 TABLET, CHEWABLE ORAL EVERY 12 HOURS PRN
Qty: 90 TABLET | Refills: 1 | Status: SHIPPED | OUTPATIENT
Start: 2025-03-19

## 2025-03-19 RX ORDER — LEVETIRACETAM 500 MG/1
500 TABLET ORAL 2 TIMES DAILY
Qty: 180 TABLET | Refills: 3 | Status: SHIPPED | OUTPATIENT
Start: 2025-03-19

## 2025-03-19 RX ORDER — AMLODIPINE BESYLATE 5 MG/1
5 TABLET ORAL DAILY
Qty: 90 TABLET | Refills: 1 | Status: SHIPPED | OUTPATIENT
Start: 2025-03-19

## 2025-03-19 RX ORDER — FERROUS SULFATE 325(65) MG
325 TABLET ORAL DAILY
Qty: 180 TABLET | Refills: 1 | Status: SHIPPED | OUTPATIENT
Start: 2025-03-19

## 2025-03-19 RX ORDER — PIOGLITAZONEHYDROCHLORIDE 30 MG/1
30 TABLET ORAL DAILY
Qty: 90 TABLET | Refills: 1 | Status: SHIPPED | OUTPATIENT
Start: 2025-03-19

## 2025-03-19 NOTE — PROGRESS NOTES
Subjective:       Patient ID: Zbigniew Ventura is a 84 y.o. female.    Chief Complaint: Diabetes (4 month fu ) and Health Maintenance (TETANUS VACCINE decline/DEXA Scan decline/Shingles Vaccine(1 of 2) decline/RSV Vaccine (Age 60+ and Pregnant patients)(1 - 1-dose 75+ series) decline/Diabetic Eye Exam due on 07/01/2021 decline/Influenza Vaccine(1) due on 09/01/2024 decline/COVID-19 Vaccine(1 - 2024-25 season) decline/Lipid Panel due on 12/14/2024-ordered today /)    History of Present Illness    CHIEF COMPLAINT:  Patient presents today for follow up.    MEDICAL HISTORY AND MEDICATIONS:  She has a seizure disorder managed with daily medication. She continues iron supplementation.         Current Medications:  Current Medications[1]           ROS  Twelve point system reviewed, unremarkable except for stated above in HPI.        Objective:         Vitals:    03/19/25 0946   BP: 111/75   BP Location: Left arm   Patient Position: Sitting   Pulse: 110   Resp: 17   Temp: 97.5 °F (36.4 °C)   TempSrc: Temporal   SpO2: 100%   Weight: 65.3 kg (144 lb)   Height: 5' (1.524 m)        Physical Exam     Patient is awake alert oriented person place and  Lungs are clear to auscultation bilaterally no crackles or wheezes   Cardiovascular S1-S2 regular rate and rhythm no murmurs rubs or gallops   Abdomen is soft positive bowel sounds nontender, extremities no clubbing cyanosis edema  Neuro no focal neurological deficits  Skin warm and dry.     Last Labs:     No visits with results within 1 Month(s) from this visit.   Latest known visit with results is:   Office Visit on 11/21/2024   Component Date Value    Protein, Urine 11/21/2024 13.3     Creatinine, Urine 11/21/2024 157     Microalbumin 11/21/2024 <5.0 (H)     Microalbumin/Creatinine * 11/21/2024      Sodium 11/21/2024 134 (L)     Potassium 11/21/2024 4.9     Chloride 11/21/2024 104     CO2 11/21/2024 21 (L)     Anion Gap 11/21/2024 14     Glucose 11/21/2024 84     BUN 11/21/2024 24 (H)      Creatinine 11/21/2024 1.16 (H)     BUN/Creatinine Ratio 11/21/2024 21 (H)     Calcium 11/21/2024 8.7     eGFR 11/21/2024 47 (L)        Last Imaging:  X-Ray Chest PA And Lateral  Narrative: EXAMINATION:  XR CHEST PA AND LATERAL    CLINICAL HISTORY:  Tachycardia, unspecified    TECHNIQUE:  PA and lateral views of the chest were performed.    COMPARISON:  04/17/2024.    FINDINGS:  The lungs are clear.  No focal consolidation.  Heart size is enlarged.  Mediastinal contours unremarkable.    Bony thorax intact.  Impression: No acute chest disease.    Electronically signed by: Ethan Lott  Date:    10/24/2024  Time:    12:16         **Labs and x-rays personally reviewed by me    ** reviewed           Assessment & Plan:   Assessment & Plan    E11.69 Type 2 diabetes mellitus with other specified complication, without long-term current use of insulin  I50.9 Congestive heart failure, unspecified HF chronicity, unspecified heart failure type  G40.909 Epilepsy, unspecified, not intractable, without status epilepticus  I10 Essential (primary) hypertension  K21.9 Gastro-esophageal reflux disease without esophagitis  D50.9 Iron deficiency anemia, unspecified  R00.0 Tachycardia, unspecified    TYPE 2 DIABETES MELLITUS WITH OTHER SPECIFIED COMPLICATION, WITHOUT LONG-TERM CURRENT USE OF INSULIN:  - Continue management of diabetes with regular monitoring of patient's diabetic status.    CONGESTIVE HEART FAILURE, UNSPECIFIED HF CHRONICITY, UNSPECIFIED HEART FAILURE TYPE:  - Monitor patient's elevated heart rate and evaluate the need for further cardiac assessment.    EPILEPSY:  - Review patient's seizure history and current management.  - Continue prescribed medication with daily administration.  - Inquire about recent seizure activity.  - Instruct patient to report any changes in frequency or severity and to refill medication before running out.    HYPERTENSION:  - Assess blood pressure, currently reported as good.  - Continue  management and monitor regularly, adjusting treatment as necessary.    GASTROESOPHAGEAL REFLUX DISEASE (GERD):  - Continue current management.  - Assess treatment effectiveness and advise on lifestyle modifications to manage symptoms.    IRON DEFICIENCY ANEMIA:  - Refill iron supplement prescription.  - Monitor hemoglobin levels to assess effectiveness.  - Educate patient on dietary iron sources and proper supplement administration.    TACHYCARDIA:  - Assess heart rate, which is currently slightly elevated.  - Monitor for associated symptoms or triggers.    GENERAL FOLLOW-UP:  - Evaluate overall condition through physical exam.  - Follow up in 3 months.  - Advise patient to return approximately 1 week before running out of medications to ensure continuous supply.           1. Dyslipidemia  -     Lipid panel; Future; Expected date: 03/19/2025    2. Type 2 diabetes mellitus without complication, without long-term current use of insulin  -     pioglitazone (ACTOS) 30 MG tablet; Take 1 tablet (30 mg total) by mouth once daily.  Dispense: 90 tablet; Refill: 1    3. Vertigo  -     meclizine (ANTIVERT) 25 MG tablet; Take 1 tablet (25 mg total) by mouth every 12 (twelve) hours as needed for Dizziness or Nausea.  Dispense: 90 tablet; Refill: 1    4. Seizure  -     levETIRAcetam (KEPPRA) 500 MG Tab; Take 1 tablet (500 mg total) by mouth 2 (two) times daily.  Dispense: 180 tablet; Refill: 3    5. Essential (primary) hypertension  -     amLODIPine (NORVASC) 5 MG tablet; Take 1 tablet (5 mg total) by mouth once daily. for blood pressure  Dispense: 90 tablet; Refill: 1  -     metoprolol succinate (TOPROL-XL) 100 MG 24 hr tablet; Take 1 tablet (100 mg total) by mouth once daily.  Dispense: 90 tablet; Refill: 3    6. Gastroesophageal reflux disease, unspecified whether esophagitis present  -     omeprazole (PRILOSEC) 20 MG capsule; Take 1 capsule (20 mg total) by mouth once daily.  Dispense: 90 capsule; Refill: 1    7. Chronic  anemia  Overview:  baseline hgb 8 to 9    Orders:  -     ferrous sulfate (FEROSUL) 325 mg (65 mg iron) Tab tablet; Take 1 tablet (325 mg total) by mouth once daily.  Dispense: 180 tablet; Refill: 1            Farhad Culver MD  This note was generated with the assistance of ambient listening technology. Verbal consent was obtained by the patient and accompanying visitor(s) for the recording of patient appointment to facilitate this note. I attest to having reviewed and edited the generated note for accuracy, though some syntax or spelling errors may persist. Please contact the author of this note for any clarification.            [1]   Current Outpatient Medications:     apixaban (ELIQUIS) 5 mg Tab, TAKE ONE (1) TABLET BY MOUTH TWICE DAILY AS DIRECTED, Disp: 60 tablet, Rfl: 11    atorvastatin (LIPITOR) 20 MG tablet, Take 1 tablet (20 mg total) by mouth once daily., Disp: 90 tablet, Rfl: 1    potassium chloride (MICRO-K) 10 MEQ CpSR, Take 1 capsule (10 mEq total) by mouth once daily., Disp: 90 capsule, Rfl: 0    amLODIPine (NORVASC) 5 MG tablet, Take 1 tablet (5 mg total) by mouth once daily. for blood pressure, Disp: 90 tablet, Rfl: 1    ferrous sulfate (FEROSUL) 325 mg (65 mg iron) Tab tablet, Take 1 tablet (325 mg total) by mouth once daily., Disp: 180 tablet, Rfl: 1    levETIRAcetam (KEPPRA) 500 MG Tab, Take 1 tablet (500 mg total) by mouth 2 (two) times daily., Disp: 180 tablet, Rfl: 3    meclizine (ANTIVERT) 25 MG tablet, Take 1 tablet (25 mg total) by mouth every 12 (twelve) hours as needed for Dizziness or Nausea., Disp: 90 tablet, Rfl: 1    metoprolol succinate (TOPROL-XL) 100 MG 24 hr tablet, Take 1 tablet (100 mg total) by mouth once daily., Disp: 90 tablet, Rfl: 3    omeprazole (PRILOSEC) 20 MG capsule, Take 1 capsule (20 mg total) by mouth once daily., Disp: 90 capsule, Rfl: 1    pioglitazone (ACTOS) 30 MG tablet, Take 1 tablet (30 mg total) by mouth once daily., Disp: 90 tablet, Rfl: 1

## 2025-04-29 ENCOUNTER — OFFICE VISIT (OUTPATIENT)
Dept: CARDIOLOGY | Facility: CLINIC | Age: 85
End: 2025-04-29
Payer: MEDICARE

## 2025-04-29 VITALS
HEIGHT: 62 IN | SYSTOLIC BLOOD PRESSURE: 112 MMHG | WEIGHT: 144.81 LBS | DIASTOLIC BLOOD PRESSURE: 70 MMHG | BODY MASS INDEX: 26.65 KG/M2 | HEART RATE: 78 BPM | OXYGEN SATURATION: 82 %

## 2025-04-29 DIAGNOSIS — D64.9 ANEMIA, UNSPECIFIED TYPE: Chronic | ICD-10-CM

## 2025-04-29 DIAGNOSIS — R56.9 SEIZURE: Chronic | ICD-10-CM

## 2025-04-29 DIAGNOSIS — N18.2 CHRONIC KIDNEY DISEASE, STAGE 2 (MILD): Chronic | ICD-10-CM

## 2025-04-29 DIAGNOSIS — E78.5 DYSLIPIDEMIA: Chronic | ICD-10-CM

## 2025-04-29 DIAGNOSIS — I10 HYPERTENSION, UNSPECIFIED TYPE: Chronic | ICD-10-CM

## 2025-04-29 DIAGNOSIS — G81.94 LEFT HEMIPARESIS: Chronic | ICD-10-CM

## 2025-04-29 DIAGNOSIS — E11.9 TYPE 2 DIABETES MELLITUS WITHOUT COMPLICATION, WITHOUT LONG-TERM CURRENT USE OF INSULIN: Chronic | ICD-10-CM

## 2025-04-29 DIAGNOSIS — I63.9 CEREBROVASCULAR ACCIDENT (CVA), UNSPECIFIED MECHANISM: Chronic | ICD-10-CM

## 2025-04-29 DIAGNOSIS — I48.0 PAROXYSMAL ATRIAL FIBRILLATION: Primary | Chronic | ICD-10-CM

## 2025-04-29 PROCEDURE — 99214 OFFICE O/P EST MOD 30 MIN: CPT | Mod: S$PBB,,, | Performed by: INTERNAL MEDICINE

## 2025-04-29 PROCEDURE — 99999 PR PBB SHADOW E&M-EST. PATIENT-LVL III: CPT | Mod: PBBFAC,,, | Performed by: INTERNAL MEDICINE

## 2025-04-29 PROCEDURE — 99213 OFFICE O/P EST LOW 20 MIN: CPT | Mod: PBBFAC | Performed by: INTERNAL MEDICINE

## 2025-05-02 NOTE — PROGRESS NOTES
PCP: Farhad Culver MD    Referring Provider:     Subjective:   Zbigniew Ventura is a 85 y.o. female with hx of NIDDM, HTN,  HLD, CVA, seizures, and paroxysmal atrial fibrillation/ flutter who presents for 6 month follow up.      Fhx: No family history on file.  Shx: Social History[1]    EKG   4/29/25--sinus tachycardia with short LA, nonspecific ST abn, 109 bpm  10/24/24 - Sinus tachycardia   4/17/24--sinus tachycardia, 116 bpm  10/17/23--NSR, 74 bpm  10/11/22--NSR, nonspecific T wave abn, 78 bpm.      Zio:  12/15/23-12/27/23--Atral flutter/ fibrillation.         ECHO Results for orders placed during the hospital encounter of 05/02/24    Echo    Interpretation Summary    Left Ventricle: The left ventricle is normal in size. Normal wall thickness. There is low normal systolic function with a visually estimated ejection fraction of 50 - 55%. Ejection fraction by visual approximation is 50%. There is diastolic dysfunction.    Right Ventricle: Normal right ventricular cavity size.    Left Atrium: Left atrium is mildly dilated.    Aortic Valve: The aortic valve is a trileaflet valve. There is moderate aortic valve sclerosis.    Mitral Valve: There is posterior leaflet sclerosis. Mildly calcified posterior leaflet. There is mild regurgitation.    Tricuspid Valve: There is mild regurgitation.       10/20/23--Interpretation Summary    Left Ventricle: The left ventricle is normal in size. Normal wall thickness. There is normal systolic function. Ejection fraction by visual approximation is 65%.    Left Atrium: Left atrium is mildly dilated.23.82cm2    Right Ventricle: Normal right ventricular cavity size. Systolic function is normal.    Aortic Valve: The aortic valve is a trileaflet valve. There is mild aortic valve sclerosis.    Mitral Valve: There is mild regurgitation.    Tricuspid Valve: There is mild regurgitation.    IVC/SVC: Normal venous pressure at 3 mmHg.     10/6/22--Interpretation Summary  · The left ventricle is  normal in size with low normal systolic function.  · The estimated ejection fraction is 50%.  · Normal left ventricular diastolic function.  · Normal right ventricular size.  · Mild mitral regurgitation.  · Mild tricuspid regurgitation.  · Normal central venous pressure (3 mmHg).  · The estimated PA systolic pressure is 30 mmHg.  · There is left ventricular global hypokinesis.      Lab Results   Component Value Date     (L) 11/21/2024    K 4.9 11/21/2024     11/21/2024    CO2 21 (L) 11/21/2024    BUN 24 (H) 11/21/2024    CREATININE 1.16 (H) 11/21/2024    CALCIUM 8.7 11/21/2024    ANIONGAP 14 11/21/2024    ESTGFRAFRICA 43 06/29/2020    EGFRNONAA 54 (L) 06/21/2022       Lab Results   Component Value Date    CHOL 155 12/14/2023    CHOL 166 11/10/2023    CHOL 152 11/07/2022     Lab Results   Component Value Date    HDL 74 (H) 12/14/2023    HDL 73 (H) 11/10/2023    HDL 47 11/07/2022     Lab Results   Component Value Date    LDLCALC 64 12/14/2023    LDLCALC 62 11/10/2023    LDLCALC 66 11/07/2022     Lab Results   Component Value Date    TRIG 84 12/14/2023    TRIG 154 (H) 11/10/2023    TRIG 193 (H) 11/07/2022     Lab Results   Component Value Date    CHOLHDL 2.1 12/14/2023    CHOLHDL 2.3 11/10/2023    CHOLHDL 3.2 11/07/2022       Lab Results   Component Value Date    WBC 4.75 10/24/2024    HGB 9.7 (L) 10/24/2024    HCT 31.2 (L) 10/24/2024    MCV 97.8 (H) 10/24/2024     10/24/2024         Current Medications[2]  Reviewed and reconciled.     Review of Systems   Respiratory:  Negative for shortness of breath.    Cardiovascular:  Negative for chest pain, palpitations and leg swelling.   Neurological:  Negative for loss of consciousness.       History of Present Illness    CHIEF COMPLAINT:  Patient presents today for follow up    CURRENT MEDICATIONS:  She takes metoprolol for cardiac protection from stress and naproxen.      Objective:   /70 (BP Location: Left arm, Patient Position: Sitting)   Pulse 78  "  Ht 5' 2" (1.575 m)   Wt 65.7 kg (144 lb 12.8 oz)   LMP  (LMP Unknown)   SpO2 (!) 82%   BMI 26.48 kg/m²     Physical Exam  Vitals reviewed.   Constitutional:       General: She is not in acute distress.     Appearance: Normal appearance.   HENT:      Head: Normocephalic and atraumatic.   Neck:      Vascular: No carotid bruit or JVD.   Cardiovascular:      Rate and Rhythm: Normal rate and regular rhythm.      Pulses: Normal pulses.           Radial pulses are 2+ on the right side and 2+ on the left side.      Heart sounds: Normal heart sounds. No murmur heard.  Pulmonary:      Effort: Pulmonary effort is normal.      Breath sounds: Normal breath sounds.   Musculoskeletal:      Right lower leg: No edema.      Left lower leg: No edema.   Skin:     General: Skin is warm and dry.   Neurological:      Mental Status: She is alert.             Assessment:     1. Paroxysmal atrial fibrillation  EKG 12-lead    EKG 12-lead      2. Dyslipidemia  Lipid Panel      3. Type 2 diabetes mellitus without complication, without long-term current use of insulin        4. Hypertension, unspecified type        5. Cerebrovascular accident (CVA), unspecified mechanism        6. Left hemiparesis        7. Seizure        8. Chronic kidney disease, stage 2 (mild)        9. Anemia, unspecified type            Assessment & Plan    IMPRESSION:  - Current medication regimen, including metoprolol, appears to be effective in managing cardiac stress.    HYPERTENSION:  - Continued metoprolol at current dose.    PAIN MANAGEMENT:  - Refilled naproxen.    HYPERLIPIDEMIA:  - Ordered lipid panel to assess cholesterol levels.  - Patient instructed to go to the lab for blood test today.    FOLLOW-UP:  - Follow up in 6 months. Will repeat echo then.         Plan:   As above.       This note was generated with the assistance of ambient listening technology. Verbal consent was obtained by the patient and accompanying visitor(s) for the recording of patient " appointment to facilitate this note. I attest to having reviewed and edited the generated note for accuracy, though some syntax or spelling errors may persist. Please contact the author of this note for any clarification.             [1]   Social History  Socioeconomic History    Marital status:    Occupational History    Occupation: disabled   Tobacco Use    Smoking status: Never     Passive exposure: Never    Smokeless tobacco: Never   Substance and Sexual Activity    Alcohol use: Never    Drug use: Never    Sexual activity: Not Currently     Social Drivers of Health     Financial Resource Strain: Medium Risk (11/10/2023)    Overall Financial Resource Strain (CARDIA)     Difficulty of Paying Living Expenses: Somewhat hard   Food Insecurity: Food Insecurity Present (11/10/2023)    Hunger Vital Sign     Worried About Running Out of Food in the Last Year: Often true     Ran Out of Food in the Last Year: Often true   Transportation Needs: Unmet Transportation Needs (11/10/2023)    PRAPARE - Transportation     Lack of Transportation (Medical): No     Lack of Transportation (Non-Medical): Yes   Physical Activity: Inactive (11/10/2023)    Exercise Vital Sign     Days of Exercise per Week: 0 days     Minutes of Exercise per Session: 0 min   Stress: No Stress Concern Present (11/10/2023)    [facility] - Occupational Stress Questionnaire     Feeling of Stress : Only a little   Housing Stability: High Risk (11/10/2023)    Housing Stability Vital Sign     Unable to Pay for Housing in the Last Year: Yes     Number of Places Lived in the Last Year: 1     Unstable Housing in the Last Year: No   [2]   Current Outpatient Medications:     amLODIPine (NORVASC) 5 MG tablet, Take 1 tablet (5 mg total) by mouth once daily. for blood pressure, Disp: 90 tablet, Rfl: 1    atorvastatin (LIPITOR) 20 MG tablet, Take 1 tablet (20 mg total) by mouth once daily., Disp: 90 tablet, Rfl: 1    ferrous sulfate  (FEROSUL) 325 mg (65 mg iron) Tab tablet, Take 1 tablet (325 mg total) by mouth once daily., Disp: 180 tablet, Rfl: 1    levETIRAcetam (KEPPRA) 500 MG Tab, Take 1 tablet (500 mg total) by mouth 2 (two) times daily., Disp: 180 tablet, Rfl: 3    meclizine (ANTIVERT) 25 MG tablet, Take 1 tablet (25 mg total) by mouth every 12 (twelve) hours as needed for Dizziness or Nausea., Disp: 90 tablet, Rfl: 1    metoprolol succinate (TOPROL-XL) 100 MG 24 hr tablet, Take 1 tablet (100 mg total) by mouth once daily., Disp: 90 tablet, Rfl: 3    omeprazole (PRILOSEC) 20 MG capsule, Take 1 capsule (20 mg total) by mouth once daily., Disp: 90 capsule, Rfl: 1    pioglitazone (ACTOS) 30 MG tablet, Take 1 tablet (30 mg total) by mouth once daily., Disp: 90 tablet, Rfl: 1    apixaban (ELIQUIS) 5 mg Tab, TAKE ONE (1) TABLET BY MOUTH TWICE DAILY AS DIRECTED, Disp: 60 tablet, Rfl: 11    potassium chloride (MICRO-K) 10 MEQ CpSR, Take 1 capsule (10 mEq total) by mouth once daily., Disp: 90 capsule, Rfl: 0

## 2025-06-13 ENCOUNTER — OFFICE VISIT (OUTPATIENT)
Dept: NEUROLOGY | Facility: CLINIC | Age: 85
End: 2025-06-13
Payer: MEDICARE

## 2025-06-13 VITALS
DIASTOLIC BLOOD PRESSURE: 60 MMHG | HEART RATE: 116 BPM | SYSTOLIC BLOOD PRESSURE: 98 MMHG | HEIGHT: 62 IN | BODY MASS INDEX: 26.87 KG/M2 | OXYGEN SATURATION: 97 % | WEIGHT: 146 LBS

## 2025-06-13 DIAGNOSIS — G40.909 NONINTRACTABLE EPILEPSY WITHOUT STATUS EPILEPTICUS, UNSPECIFIED EPILEPSY TYPE: ICD-10-CM

## 2025-06-13 DIAGNOSIS — R42 VERTIGO: ICD-10-CM

## 2025-06-13 DIAGNOSIS — I63.9 CEREBROVASCULAR ACCIDENT (CVA), UNSPECIFIED MECHANISM: Primary | ICD-10-CM

## 2025-06-13 PROCEDURE — 99999 PR PBB SHADOW E&M-EST. PATIENT-LVL IV: CPT | Mod: PBBFAC,,, | Performed by: NURSE PRACTITIONER

## 2025-06-13 PROCEDURE — 99214 OFFICE O/P EST MOD 30 MIN: CPT | Mod: PBBFAC | Performed by: NURSE PRACTITIONER

## 2025-06-13 NOTE — PROGRESS NOTES
Subjective:       Patient ID: Zbigniew Ventura is a 85 y.o. female     Chief Complaint:    No chief complaint on file.         Allergies:  Patient has no known allergies.    Current Medications:    Outpatient Encounter Medications as of 6/13/2025   Medication Sig Dispense Refill    amLODIPine (NORVASC) 5 MG tablet Take 1 tablet (5 mg total) by mouth once daily. for blood pressure 90 tablet 1    apixaban (ELIQUIS) 5 mg Tab TAKE ONE (1) TABLET BY MOUTH TWICE DAILY AS DIRECTED 60 tablet 11    atorvastatin (LIPITOR) 20 MG tablet Take 1 tablet (20 mg total) by mouth once daily. 90 tablet 1    ferrous sulfate (FEROSUL) 325 mg (65 mg iron) Tab tablet Take 1 tablet (325 mg total) by mouth once daily. 180 tablet 1    levETIRAcetam (KEPPRA) 500 MG Tab Take 1 tablet (500 mg total) by mouth 2 (two) times daily. 180 tablet 3    meclizine (ANTIVERT) 25 MG tablet Take 1 tablet (25 mg total) by mouth every 12 (twelve) hours as needed for Dizziness or Nausea. 90 tablet 1    metoprolol succinate (TOPROL-XL) 100 MG 24 hr tablet Take 1 tablet (100 mg total) by mouth once daily. for the heart and blood pressure 90 tablet 3    omeprazole (PRILOSEC) 20 MG capsule Take 1 capsule (20 mg total) by mouth once daily. 90 capsule 1    pioglitazone (ACTOS) 30 MG tablet Take 1 tablet (30 mg total) by mouth once daily. 90 tablet 1    potassium chloride (MICRO-K) 10 MEQ CpSR Take 1 capsule (10 mEq total) by mouth once daily. 90 capsule 0    [DISCONTINUED] tamsulosin (FLOMAX) 0.4 mg Cap Take 1 capsule (0.4 mg total) by mouth once daily. 90 capsule 1     No facility-administered encounter medications on file as of 6/13/2025.       History of Present Illness  This Is a 84-year-old past history of diabetes and right hemispheric stroke and seizure.    Her family in room assists with communication, her .    They deny any seizures since her last visit with us and no new CVA symptoms.  She is on triple therapy including ASA 81mg as well as  keppra 500mg BID which is filled by her primary care and denies any side effects.    She does have residual left-sided weakness from stroke but it is minimal. In     They do report that she has occasional episodes of dizziness, this since her prior CVA.  Her  has let her use his prescribed meclizine which worked very well.  She is being followed by cardiology, prior seen by Dr. Alcazar.  History of A-fib.         Review of Systems  Review of Systems   Constitutional:  Negative for diaphoresis and fever.   HENT:  Negative for congestion, hearing loss and tinnitus.    Eyes:  Negative for blurred vision, double vision, photophobia, discharge and redness.   Respiratory:  Negative for cough and shortness of breath.    Cardiovascular:  Negative for chest pain.   Gastrointestinal:  Negative for abdominal pain, nausea and vomiting.   Musculoskeletal:  Negative for back pain, joint pain, myalgias and neck pain.   Skin:  Negative for itching and rash.   Neurological:  Positive for dizziness and seizures. Negative for tremors, sensory change, speech change, focal weakness, loss of consciousness, weakness and headaches.   Psychiatric/Behavioral:  Negative for depression, hallucinations and memory loss. The patient does not have insomnia.    All other systems reviewed and are negative.     Objective:     NEUROLOGICAL EXAMINATION:     MENTAL STATUS   Oriented to person, place, and time.   Attention: normal. Concentration: normal.   Speech: speech is normal   Level of consciousness: alert  Knowledge: good and consistent with education.   Normal comprehension.     CRANIAL NERVES     CN II   Visual fields full to confrontation.   Visual acuity: normal  Right visual field deficit: none  Left visual field deficit: none     CN III, IV, VI   Pupils are equal, round, and reactive to light.  Extraocular motions are normal.   Right pupil: Size: 3 mm. Shape: regular. Reactivity: brisk. Consensual response: intact. Accommodation:  intact.   Left pupil: Size: 3 mm. Shape: regular. Reactivity: brisk. Consensual response: intact. Accommodation: intact.   CN III: no CN III palsy  CN VI: no CN VI palsy  Nystagmus: none   Diplopia: none  Upgaze: normal  Downgaze: normal  Conjugate gaze: present  Vestibulo-ocular reflex: present    CN V   Facial sensation intact.   Right facial sensation deficit: none  Left facial sensation deficit: none  Right corneal reflex: normal  Left corneal reflex: normal    CN VII   Facial expression full, symmetric.   Right facial weakness: none  Left facial weakness: none  Right taste: normal  Left taste: normal    CN VIII   CN VIII normal.   Hearing: intact    CN IX, X   CN IX normal.   CN X normal.   Palate: symmetric    CN XI   CN XI normal.   Right sternocleidomastoid strength: normal  Left sternocleidomastoid strength: normal  Right trapezius strength: normal  Left trapezius strength: normal    CN XII   CN XII normal.   Tongue: not atrophic  Fasciculations: absent  Tongue deviation: none    MOTOR EXAM   Muscle bulk: normal  Overall muscle tone: normal  Right arm tone: normal  Left arm tone: normal  Right arm pronator drift: absent  Left arm pronator drift: absent  Right leg tone: normal  Left leg tone: normal    Strength   Right neck flexion: 5/5  Left neck flexion: 5/5  Right neck extension: 5/5  Left neck extension: 5/5  Right deltoid: 5/5  Left deltoid: 5/5  Right biceps: 5/5  Left biceps: 5/5  Right triceps: 5/5  Left triceps: 5/5  Right wrist flexion: 5/5  Left wrist flexion: 5/5  Right wrist extension: 5/5  Left wrist extension: 5/5  Right interossei: 5/5  Left interossei: 5/5  Right iliopsoas: 5/5  Left iliopsoas: 5/5  Right quadriceps: 5/5  Left quadriceps: 5/5  Right hamstrin/5  Left hamstrin/5  Right anterior tibial: 5/5  Left anterior tibial: 5/5  Right posterior tibial: 5/5  Left posterior tibial: 5/5  Right gastroc: 5/5  Left gastroc: 5/5    REFLEXES     Reflexes   Right brachioradialis: 2+  Left  brachioradialis: 2+  Right biceps: 2+  Left biceps: 2+  Right triceps: 2+  Left triceps: 2+  Right patellar: 2+  Left patellar: 2+  Right achilles: 2+  Left achilles: 2+  Right plantar: normal  Left plantar: normal  Right Calvin: absent  Left Calvin: absent  Right ankle clonus: absent  Left ankle clonus: absent  Right pendular knee jerk: absent  Left pendular knee jerk: absent    SENSORY EXAM   Light touch normal.   Right arm light touch: normal  Left arm light touch: normal  Right leg light touch: normal  Left leg light touch: normal  Vibration normal.   Right arm vibration: normal  Left arm vibration: normal  Right leg vibration: normal  Left leg vibration: normal  Proprioception normal.   Right arm proprioception: normal  Left arm proprioception: normal  Right leg proprioception: normal  Left leg proprioception: normal  Pinprick normal.   Right arm pinprick: normal  Left arm pinprick: normal  Right leg pinprick: normal  Left leg pinprick: normal  Graphesthesia: normal  Romberg: negative  Stereognosis: normal    GAIT AND COORDINATION      Coordination   Finger to nose coordination: normal  Heel to shin coordination: normal  Tandem walking coordination: abnormal    Tremor   Resting tremor: absent  Intention tremor: absent  Action tremor: absent       Using cane to assist with ambulation        Physical Exam  Vitals and nursing note reviewed.   Constitutional:       Appearance: Normal appearance.   HENT:      Head: Normocephalic.   Eyes:      Extraocular Movements: Extraocular movements intact and EOM normal.      Pupils: Pupils are equal, round, and reactive to light.   Cardiovascular:      Rate and Rhythm: Normal rate and regular rhythm.   Pulmonary:      Effort: Pulmonary effort is normal.      Breath sounds: Normal breath sounds.   Musculoskeletal:         General: No swelling or tenderness. Normal range of motion.      Cervical back: Normal range of motion and neck supple.      Right lower leg: No edema.       Left lower leg: No edema.   Skin:     General: Skin is warm and dry.      Coloration: Skin is not jaundiced.      Findings: No rash.   Neurological:      General: No focal deficit present.      Mental Status: She is alert and oriented to person, place, and time.      GCS: GCS eye subscore is 4. GCS verbal subscore is 5. GCS motor subscore is 6.      Cranial Nerves: No cranial nerve deficit.      Sensory: No sensory deficit.      Motor: Motor function is intact. No weakness.      Coordination: Coordination is intact. Coordination normal. Finger-Nose-Finger Test, Heel to Shin Test and Romberg Test normal.      Gait: Gait abnormal and tandem walk abnormal.      Deep Tendon Reflexes: Reflexes normal.      Reflex Scores:       Tricep reflexes are 2+ on the right side and 2+ on the left side.       Bicep reflexes are 2+ on the right side and 2+ on the left side.       Brachioradialis reflexes are 2+ on the right side and 2+ on the left side.       Patellar reflexes are 2+ on the right side and 2+ on the left side.       Achilles reflexes are 2+ on the right side and 2+ on the left side.  Psychiatric:         Mood and Affect: Mood normal.         Speech: Speech normal.         Behavior: Behavior normal.        Assessment:     Problem List Items Addressed This Visit    None                     Primary Diagnosis and ICD10  No primary diagnosis found.    Plan:     There are no Patient Instructions on file for this visit.    There are no discontinued medications.              Requested Prescriptions      No prescriptions requested or ordered in this encounter       No orders of the defined types were placed in this encounter.

## 2025-06-15 RX ORDER — ATORVASTATIN CALCIUM 20 MG/1
20 TABLET, FILM COATED ORAL DAILY
Qty: 90 TABLET | Refills: 1 | Status: SHIPPED | OUTPATIENT
Start: 2025-06-15

## 2025-06-16 RX ORDER — NAPROXEN 375 MG/1
TABLET ORAL
Qty: 30 TABLET | Refills: 0 | OUTPATIENT
Start: 2025-06-16

## 2025-06-19 ENCOUNTER — OFFICE VISIT (OUTPATIENT)
Dept: FAMILY MEDICINE | Facility: CLINIC | Age: 85
End: 2025-06-19
Payer: MEDICARE

## 2025-06-19 ENCOUNTER — RESULTS FOLLOW-UP (OUTPATIENT)
Dept: FAMILY MEDICINE | Facility: CLINIC | Age: 85
End: 2025-06-19
Payer: MEDICARE

## 2025-06-19 VITALS
WEIGHT: 147 LBS | TEMPERATURE: 97 F | DIASTOLIC BLOOD PRESSURE: 60 MMHG | SYSTOLIC BLOOD PRESSURE: 95 MMHG | RESPIRATION RATE: 17 BRPM | OXYGEN SATURATION: 95 % | BODY MASS INDEX: 27.05 KG/M2 | HEIGHT: 62 IN | HEART RATE: 108 BPM

## 2025-06-19 DIAGNOSIS — K21.9 GASTROESOPHAGEAL REFLUX DISEASE, UNSPECIFIED WHETHER ESOPHAGITIS PRESENT: ICD-10-CM

## 2025-06-19 DIAGNOSIS — R42 VERTIGO: ICD-10-CM

## 2025-06-19 DIAGNOSIS — E78.5 DYSLIPIDEMIA: ICD-10-CM

## 2025-06-19 DIAGNOSIS — E11.9 TYPE 2 DIABETES MELLITUS WITHOUT COMPLICATION, WITHOUT LONG-TERM CURRENT USE OF INSULIN: ICD-10-CM

## 2025-06-19 DIAGNOSIS — R56.9 SEIZURE: Primary | ICD-10-CM

## 2025-06-19 DIAGNOSIS — I10 HYPERTENSION, ESSENTIAL: ICD-10-CM

## 2025-06-19 DIAGNOSIS — D64.9 CHRONIC ANEMIA: ICD-10-CM

## 2025-06-19 DIAGNOSIS — I10 ESSENTIAL (PRIMARY) HYPERTENSION: ICD-10-CM

## 2025-06-19 LAB
ALBUMIN SERPL BCP-MCNC: 3.9 G/DL (ref 3.4–4.8)
ALBUMIN/GLOB SERPL: 1 {RATIO}
ALP SERPL-CCNC: 69 U/L (ref 40–150)
ALT SERPL W P-5'-P-CCNC: 21 U/L
ANION GAP SERPL CALCULATED.3IONS-SCNC: 13 MMOL/L (ref 7–16)
AST SERPL W P-5'-P-CCNC: 32 U/L (ref 11–45)
BASOPHILS # BLD AUTO: 0.04 K/UL (ref 0–0.2)
BASOPHILS NFR BLD AUTO: 0.6 % (ref 0–1)
BILIRUB SERPL-MCNC: 0.3 MG/DL
BUN SERPL-MCNC: 32 MG/DL (ref 10–20)
BUN/CREAT SERPL: 16 (ref 6–20)
CALCIUM SERPL-MCNC: 8.5 MG/DL (ref 8.4–10.2)
CHLORIDE SERPL-SCNC: 102 MMOL/L (ref 98–107)
CHOLEST SERPL-MCNC: 142 MG/DL
CHOLEST/HDLC SERPL: 3.4 {RATIO}
CO2 SERPL-SCNC: 21 MMOL/L (ref 23–31)
CREAT SERPL-MCNC: 2.02 MG/DL (ref 0.55–1.02)
DIFFERENTIAL METHOD BLD: ABNORMAL
EGFR (NO RACE VARIABLE) (RUSH/TITUS): 24 ML/MIN/1.73M2
EOSINOPHIL # BLD AUTO: 0.19 K/UL (ref 0–0.5)
EOSINOPHIL NFR BLD AUTO: 3 % (ref 1–4)
ERYTHROCYTE [DISTWIDTH] IN BLOOD BY AUTOMATED COUNT: 13.3 % (ref 11.5–14.5)
EST. AVERAGE GLUCOSE BLD GHB EST-MCNC: 137 MG/DL
GLOBULIN SER-MCNC: 3.9 G/DL (ref 2–4)
GLUCOSE SERPL-MCNC: 95 MG/DL (ref 82–115)
HBA1C MFR BLD HPLC: 6.4 %
HCT VFR BLD AUTO: 30.2 % (ref 38–47)
HDLC SERPL-MCNC: 42 MG/DL (ref 35–60)
HGB BLD-MCNC: 9.4 G/DL (ref 12–16)
IMM GRANULOCYTES # BLD AUTO: 0.02 K/UL (ref 0–0.04)
IMM GRANULOCYTES NFR BLD: 0.3 % (ref 0–0.4)
LDLC SERPL CALC-MCNC: 77 MG/DL
LDLC/HDLC SERPL: 1.8 {RATIO}
LYMPHOCYTES # BLD AUTO: 1.55 K/UL (ref 1–4.8)
LYMPHOCYTES NFR BLD AUTO: 24.9 % (ref 27–41)
MCH RBC QN AUTO: 30 PG (ref 27–31)
MCHC RBC AUTO-ENTMCNC: 31.1 G/DL (ref 32–36)
MCV RBC AUTO: 96.5 FL (ref 80–96)
MONOCYTES # BLD AUTO: 0.48 K/UL (ref 0–0.8)
MONOCYTES NFR BLD AUTO: 7.7 % (ref 2–6)
MPC BLD CALC-MCNC: 9.6 FL (ref 9.4–12.4)
NEUTROPHILS # BLD AUTO: 3.95 K/UL (ref 1.8–7.7)
NEUTROPHILS NFR BLD AUTO: 63.5 % (ref 53–65)
NONHDLC SERPL-MCNC: 100 MG/DL
NRBC # BLD AUTO: 0 X10E3/UL
NRBC, AUTO (.00): 0 %
PLATELET # BLD AUTO: 215 K/UL (ref 150–400)
POTASSIUM SERPL-SCNC: 5.4 MMOL/L (ref 3.5–5.1)
PROT SERPL-MCNC: 7.8 G/DL (ref 5.8–7.6)
RBC # BLD AUTO: 3.13 M/UL (ref 4.2–5.4)
SODIUM SERPL-SCNC: 131 MMOL/L (ref 136–145)
TRIGL SERPL-MCNC: 116 MG/DL (ref 37–140)
VLDLC SERPL-MCNC: 23 MG/DL
WBC # BLD AUTO: 6.23 K/UL (ref 4.5–11)

## 2025-06-19 PROCEDURE — 99214 OFFICE O/P EST MOD 30 MIN: CPT | Mod: ,,, | Performed by: INTERNAL MEDICINE

## 2025-06-19 PROCEDURE — 83036 HEMOGLOBIN GLYCOSYLATED A1C: CPT | Mod: ,,, | Performed by: CLINICAL MEDICAL LABORATORY

## 2025-06-19 PROCEDURE — 80061 LIPID PANEL: CPT | Mod: ,,, | Performed by: CLINICAL MEDICAL LABORATORY

## 2025-06-19 PROCEDURE — 80053 COMPREHEN METABOLIC PANEL: CPT | Mod: ,,, | Performed by: CLINICAL MEDICAL LABORATORY

## 2025-06-19 PROCEDURE — 80177 DRUG SCRN QUAN LEVETIRACETAM: CPT | Mod: ,,, | Performed by: CLINICAL MEDICAL LABORATORY

## 2025-06-19 PROCEDURE — 85025 COMPLETE CBC W/AUTO DIFF WBC: CPT | Mod: ,,, | Performed by: CLINICAL MEDICAL LABORATORY

## 2025-06-19 RX ORDER — MECLIZINE HCL 25MG 25 MG/1
25 TABLET, CHEWABLE ORAL EVERY 12 HOURS PRN
Qty: 90 TABLET | Refills: 1 | Status: SHIPPED | OUTPATIENT
Start: 2025-06-19

## 2025-06-19 RX ORDER — OMEPRAZOLE 20 MG/1
20 CAPSULE, DELAYED RELEASE ORAL DAILY
Qty: 90 CAPSULE | Refills: 1 | Status: SHIPPED | OUTPATIENT
Start: 2025-06-19

## 2025-06-19 RX ORDER — METOPROLOL SUCCINATE 100 MG/1
100 TABLET, EXTENDED RELEASE ORAL DAILY
Qty: 90 TABLET | Refills: 3 | Status: CANCELLED | OUTPATIENT
Start: 2025-06-19 | End: 2026-06-19

## 2025-06-19 RX ORDER — PIOGLITAZONE 30 MG/1
30 TABLET ORAL DAILY
Qty: 90 TABLET | Refills: 1 | Status: SHIPPED | OUTPATIENT
Start: 2025-06-19

## 2025-06-19 RX ORDER — POTASSIUM CHLORIDE 750 MG/1
10 CAPSULE, EXTENDED RELEASE ORAL DAILY
Qty: 90 CAPSULE | Refills: 0 | Status: SHIPPED | OUTPATIENT
Start: 2025-06-19

## 2025-06-19 RX ORDER — AMLODIPINE BESYLATE 5 MG/1
5 TABLET ORAL DAILY
Qty: 90 TABLET | Refills: 1 | Status: CANCELLED | OUTPATIENT
Start: 2025-06-19

## 2025-06-19 RX ORDER — LEVETIRACETAM 500 MG/1
500 TABLET ORAL 2 TIMES DAILY
Qty: 180 TABLET | Refills: 3 | Status: SHIPPED | OUTPATIENT
Start: 2025-06-19

## 2025-06-19 RX ORDER — FERROUS SULFATE 325(65) MG
325 TABLET ORAL DAILY
Qty: 180 TABLET | Refills: 1 | Status: SHIPPED | OUTPATIENT
Start: 2025-06-19

## 2025-06-19 RX ORDER — METOPROLOL SUCCINATE 50 MG/1
50 TABLET, EXTENDED RELEASE ORAL DAILY
Qty: 90 TABLET | Refills: 1 | Status: SHIPPED | OUTPATIENT
Start: 2025-06-19 | End: 2026-06-19

## 2025-06-19 NOTE — PROGRESS NOTES
"Subjective:       Patient ID: Zbigniew Ventura is a 85 y.o. female.    Chief Complaint: Dyslipidemia and Health Maintenance (TETANUS VACCINE Never done/DEXA Scan Never done/Shingles Vaccine(1 of 2) Never done/RSV Vaccine (Age 60+ and Pregnant patients)(1 - 1-dose 75+ series) Never done/Diabetic Eye Exam due on 07/01/2021/COVID-19 Vaccine(1 - 2024-25 season) Never done/Lipid Panel due on 12/14/2024/Hemoglobin A1c due on 04/24/2025 )    History of Present Illness    CHIEF COMPLAINT:  Patient presents today for follow up.    BLOOD PRESSURE:  She reports awareness that her blood pressure is running lower than desired.         Current Medications:  Current Medications[1]           ROS  Twelve point system reviewed, unremarkable except for stated above in HPI.        Objective:         Vitals:    06/19/25 1016   BP: 95/60   BP Location: Left arm   Patient Position: Sitting   Pulse: 108   Resp: 17   Temp: 97 °F (36.1 °C)   TempSrc: Temporal   SpO2: 95%   Weight: 66.7 kg (147 lb)   Height: 5' 2" (1.575 m)        Physical Exam     Patient is awake alert oriented person place and  Lungs are clear to auscultation bilaterally no crackles or wheezes   Cardiovascular S1-S2 regular rate and rhythm no murmurs rubs or gallops   Abdomen is soft positive bowel sounds nontender, extremities no clubbing cyanosis edema  Neuro no focal neurological deficits  Skin warm and dry.     Last Labs:     No visits with results within 1 Month(s) from this visit.   Latest known visit with results is:   Office Visit on 04/29/2025   Component Date Value    QRS Duration 04/29/2025 82     [facility] QTC Calculation 04/29/2025 455        Last Imaging:  X-Ray Chest PA And Lateral  Narrative: EXAMINATION:  XR CHEST PA AND LATERAL    CLINICAL HISTORY:  Tachycardia, unspecified    TECHNIQUE:  PA and lateral views of the chest were performed.    COMPARISON:  04/17/2024.    FINDINGS:  The lungs are clear.  No focal consolidation.  Heart size is enlarged.  Mediastinal " contours unremarkable.    Bony thorax intact.  Impression: No acute chest disease.    Electronically signed by: Ethan Lott  Date:    10/24/2024  Time:    12:16         **Labs and x-rays personally reviewed by me    ** reviewed           Assessment & Plan:   Assessment & Plan    E11.9 Type 2 diabetes mellitus without complication, without long-term current use of insulin  R56.9 Seizure  E78.5 Dyslipidemia  I10 Hypertension, essential  D64.9 Chronic anemia  R42 Vertigo  K21.9 Gastroesophageal reflux disease, unspecified whether esophagitis present  I50.9 Congestive heart failure, unspecified HF chronicity, unspecified heart failure type    IMPRESSION:  - Assessed BP as running low.  - Evaluated cardiac function through auscultation.    I10 HYPERTENSION, ESSENTIAL:  - Discontinued amlodipine (Norvasc) and decreased metoprolol from 100 mg to 50 mg daily as patient's blood pressure is running a little bit low.  - Auscultated heart during exam.  - Scheduled follow-up visit in 3 weeks to reassess BP after medication changes.    ** DIAGNOSES NOT IN VISIT DX OR FOR REVIEW **  I50.9 CONGESTIVE HEART FAILURE, UNSPECIFIED HF CHRONICITY, UNSPECIFIED HEART FAILURE TYPE:  - Medication adjustments made to manage both heart failure and blood pressure concerns.  - Decreased metoprolol from 100 mg to 50 mg daily and discontinued amlodipine (Norvasc) to prevent hypotension.  - Heart auscultation performed during exam.  - Will reassess at follow-up visit in 3 weeks.           1. Seizure  -     Levetiracetam Level; Future; Expected date: 06/19/2025  -     levETIRAcetam (KEPPRA) 500 MG Tab; Take 1 tablet (500 mg total) by mouth 2 (two) times daily.  Dispense: 180 tablet; Refill: 3    2. Dyslipidemia  -     Lipid Panel; Future; Expected date: 06/19/2025  -     Lipid Panel    3. Type 2 diabetes mellitus without complication, without long-term current use of insulin  -     Hemoglobin A1C; Future; Expected date: 06/19/2025  -      pioglitazone (ACTOS) 30 MG tablet; Take 1 tablet (30 mg total) by mouth once daily.  Dispense: 90 tablet; Refill: 1    4. Hypertension, essential  -     Comprehensive Metabolic Panel; Future; Expected date: 06/19/2025  -     CBC Auto Differential; Future; Expected date: 06/19/2025  -     metoprolol succinate (TOPROL-XL) 50 MG 24 hr tablet; Take 1 tablet (50 mg total) by mouth once daily.  Dispense: 90 tablet; Refill: 1    5. Essential (primary) hypertension    6. Chronic anemia  Overview:  baseline hgb 8 to 9    Orders:  -     ferrous sulfate (FEROSUL) 325 mg (65 mg iron) Tab tablet; Take 1 tablet (325 mg total) by mouth once daily.  Dispense: 180 tablet; Refill: 1    7. Vertigo  -     meclizine (ANTIVERT) 25 MG tablet; Take 1 tablet (25 mg total) by mouth every 12 (twelve) hours as needed for Dizziness or Nausea.  Dispense: 90 tablet; Refill: 1    8. Gastroesophageal reflux disease, unspecified whether esophagitis present  -     omeprazole (PRILOSEC) 20 MG capsule; Take 1 capsule (20 mg total) by mouth once daily.  Dispense: 90 capsule; Refill: 1    Other orders  -     potassium chloride (MICRO-K) 10 MEQ CpSR; Take 1 capsule (10 mEq total) by mouth once daily.  Dispense: 90 capsule; Refill: 0            Farhad Culver MD  This note was generated with the assistance of ambient listening technology. Verbal consent was obtained by the patient and accompanying visitor(s) for the recording of patient appointment to facilitate this note. I attest to having reviewed and edited the generated note for accuracy, though some syntax or spelling errors may persist. Please contact the author of this note for any clarification.            [1]   Current Outpatient Medications:     apixaban (ELIQUIS) 5 mg Tab, TAKE ONE (1) TABLET BY MOUTH TWICE DAILY AS DIRECTED, Disp: 60 tablet, Rfl: 11    atorvastatin (LIPITOR) 20 MG tablet, Take 1 tablet (20 mg total) by mouth once daily., Disp: 90 tablet, Rfl: 1    ferrous sulfate  (FEROSUL) 325 mg (65 mg iron) Tab tablet, Take 1 tablet (325 mg total) by mouth once daily., Disp: 180 tablet, Rfl: 1    levETIRAcetam (KEPPRA) 500 MG Tab, Take 1 tablet (500 mg total) by mouth 2 (two) times daily., Disp: 180 tablet, Rfl: 3    meclizine (ANTIVERT) 25 MG tablet, Take 1 tablet (25 mg total) by mouth every 12 (twelve) hours as needed for Dizziness or Nausea., Disp: 90 tablet, Rfl: 1    metoprolol succinate (TOPROL-XL) 50 MG 24 hr tablet, Take 1 tablet (50 mg total) by mouth once daily., Disp: 90 tablet, Rfl: 1    omeprazole (PRILOSEC) 20 MG capsule, Take 1 capsule (20 mg total) by mouth once daily., Disp: 90 capsule, Rfl: 1    pioglitazone (ACTOS) 30 MG tablet, Take 1 tablet (30 mg total) by mouth once daily., Disp: 90 tablet, Rfl: 1    potassium chloride (MICRO-K) 10 MEQ CpSR, Take 1 capsule (10 mEq total) by mouth once daily., Disp: 90 capsule, Rfl: 0

## 2025-06-20 LAB — LEVETIRACETAM SERPL-MCNC: 64.7 ΜG/ML (ref 12–46)

## 2025-06-21 ENCOUNTER — HOSPITAL ENCOUNTER (EMERGENCY)
Facility: HOSPITAL | Age: 85
Discharge: HOME | End: 2025-06-21
Payer: MEDICARE

## 2025-06-21 VITALS
HEIGHT: 62 IN | SYSTOLIC BLOOD PRESSURE: 123 MMHG | DIASTOLIC BLOOD PRESSURE: 60 MMHG | WEIGHT: 147 LBS | OXYGEN SATURATION: 99 % | TEMPERATURE: 98 F | BODY MASS INDEX: 27.05 KG/M2 | RESPIRATION RATE: 18 BRPM | HEART RATE: 52 BPM

## 2025-06-21 DIAGNOSIS — I50.9 ACUTE CONGESTIVE HEART FAILURE, UNSPECIFIED HEART FAILURE TYPE: Primary | ICD-10-CM

## 2025-06-21 DIAGNOSIS — R06.02 SOB (SHORTNESS OF BREATH): ICD-10-CM

## 2025-06-21 LAB
ALBUMIN SERPL BCP-MCNC: 3.7 G/DL (ref 3.4–4.8)
ALBUMIN/GLOB SERPL: 1.1 {RATIO}
ALP SERPL-CCNC: 64 U/L (ref 40–150)
ALT SERPL W P-5'-P-CCNC: 17 U/L
ANION GAP SERPL CALCULATED.3IONS-SCNC: 13 MMOL/L (ref 7–16)
AST SERPL W P-5'-P-CCNC: 23 U/L (ref 11–45)
BASOPHILS # BLD AUTO: 0.03 K/UL (ref 0–0.2)
BASOPHILS NFR BLD AUTO: 0.6 % (ref 0–1)
BILIRUB SERPL-MCNC: 0.3 MG/DL
BUN SERPL-MCNC: 27 MG/DL (ref 10–20)
BUN/CREAT SERPL: 18 (ref 6–20)
CALCIUM SERPL-MCNC: 8.3 MG/DL (ref 8.4–10.2)
CHLORIDE SERPL-SCNC: 105 MMOL/L (ref 98–107)
CO2 SERPL-SCNC: 19 MMOL/L (ref 23–31)
CREAT SERPL-MCNC: 1.54 MG/DL (ref 0.55–1.02)
DIFFERENTIAL METHOD BLD: ABNORMAL
EGFR (NO RACE VARIABLE) (RUSH/TITUS): 33 ML/MIN/1.73M2
EOSINOPHIL # BLD AUTO: 0.17 K/UL (ref 0–0.5)
EOSINOPHIL NFR BLD AUTO: 3.2 % (ref 1–4)
ERYTHROCYTE [DISTWIDTH] IN BLOOD BY AUTOMATED COUNT: 13.1 % (ref 11.5–14.5)
GLOBULIN SER-MCNC: 3.5 G/DL (ref 2–4)
GLUCOSE SERPL-MCNC: 130 MG/DL (ref 82–115)
HCT VFR BLD AUTO: 26.7 % (ref 38–47)
HGB BLD-MCNC: 8.7 G/DL (ref 12–16)
IMM GRANULOCYTES # BLD AUTO: 0.03 K/UL (ref 0–0.04)
IMM GRANULOCYTES NFR BLD: 0.6 % (ref 0–0.4)
LYMPHOCYTES # BLD AUTO: 0.87 K/UL (ref 1–4.8)
LYMPHOCYTES NFR BLD AUTO: 16.3 % (ref 27–41)
MCH RBC QN AUTO: 29.6 PG (ref 27–31)
MCHC RBC AUTO-ENTMCNC: 32.6 G/DL (ref 32–36)
MCV RBC AUTO: 90.8 FL (ref 80–96)
MONOCYTES # BLD AUTO: 0.37 K/UL (ref 0–0.8)
MONOCYTES NFR BLD AUTO: 6.9 % (ref 2–6)
MPC BLD CALC-MCNC: 9.2 FL (ref 9.4–12.4)
NEUTROPHILS # BLD AUTO: 3.86 K/UL (ref 1.8–7.7)
NEUTROPHILS NFR BLD AUTO: 72.4 % (ref 53–65)
NRBC # BLD AUTO: 0 X10E3/UL
NRBC, AUTO (.00): 0 %
NT-PROBNP SERPL-MCNC: 3880 PG/ML (ref 1–450)
PLATELET # BLD AUTO: 188 K/UL (ref 150–400)
POTASSIUM SERPL-SCNC: 4.6 MMOL/L (ref 3.5–5.1)
PROT SERPL-MCNC: 7.2 G/DL (ref 5.8–7.6)
RBC # BLD AUTO: 2.94 M/UL (ref 4.2–5.4)
SODIUM SERPL-SCNC: 132 MMOL/L (ref 136–145)
TROPONIN I SERPL HS-MCNC: 6.6 NG/L
WBC # BLD AUTO: 5.33 K/UL (ref 4.5–11)

## 2025-06-21 PROCEDURE — 83880 ASSAY OF NATRIURETIC PEPTIDE: CPT | Performed by: EMERGENCY MEDICINE

## 2025-06-21 PROCEDURE — 93005 ELECTROCARDIOGRAM TRACING: CPT

## 2025-06-21 PROCEDURE — 36415 COLL VENOUS BLD VENIPUNCTURE: CPT | Performed by: EMERGENCY MEDICINE

## 2025-06-21 PROCEDURE — 85025 COMPLETE CBC W/AUTO DIFF WBC: CPT | Performed by: EMERGENCY MEDICINE

## 2025-06-21 PROCEDURE — 93010 ELECTROCARDIOGRAM REPORT: CPT | Mod: ,,, | Performed by: INTERNAL MEDICINE

## 2025-06-21 PROCEDURE — 84484 ASSAY OF TROPONIN QUANT: CPT | Performed by: EMERGENCY MEDICINE

## 2025-06-21 PROCEDURE — 80053 COMPREHEN METABOLIC PANEL: CPT | Performed by: EMERGENCY MEDICINE

## 2025-06-21 PROCEDURE — 99285 EMERGENCY DEPT VISIT HI MDM: CPT | Mod: 25

## 2025-06-21 RX ORDER — FUROSEMIDE 20 MG/1
20 TABLET ORAL DAILY
Qty: 5 TABLET | Refills: 0 | Status: SHIPPED | OUTPATIENT
Start: 2025-06-21 | End: 2026-06-21

## 2025-06-21 NOTE — ED PROVIDER NOTES
Encounter Date: 6/21/2025       History     Chief Complaint   Patient presents with    Shortness of Breath     After consulting [place]  Patient presents to er with complaint of shortness of breath and dizziness every morning.      86 y/o female presents to the emergency department for reported SOB and dizziness.  Ongoing per her  about 2-3 days.  Denies pain by their report.  No vomiting, no fever.  She has underlying history of epilepsy, A-fib, chronic anemia, and GERD.  Denies missing any of her recent medications.    Last evaluation with Dr. Alcazar in April of 2025, no changes doing well.  Prior echo April 2024 with EF 55%.      Review of patient's allergies indicates:  No Known Allergies  Past Medical History:   Diagnosis Date    Atrial fibrillation with RVR 10/06/2022    Atrial flutter 10/17/2022    followed by Dr. Alcazar    Chronic anemia     baseline hgb 8 to 9    Chronic kidney disease, stage 2 (mild)     baseline creat 1.1    Epilepsy, unspecified, not intractable, without status epilepticus     followed by Jj Goodrich NP, [facility] neurology    Gastroesophageal reflux disease 04/20/2023    Paroxysmal atrial fibrillation 11/07/2022    Stroke      History reviewed. No pertinent surgical history.  No family history on file.  Social History[1]  Review of Systems   Constitutional:  Negative for fever.   HENT:  Negative for sore throat.    Respiratory:  Positive for shortness of breath.    Cardiovascular:  Negative for chest pain.   Gastrointestinal:  Negative for nausea.   Genitourinary:  Negative for dysuria.   Musculoskeletal:  Negative for back pain.   Skin:  Negative for rash.   Neurological:  Negative for weakness.   Hematological:  Does not bruise/bleed easily.   All other systems reviewed and are negative.      Physical Exam     Initial Vitals   BP Pulse Resp Temp SpO2   06/21/25 1416 06/21/25 1416 06/21/25 1416 06/21/25 1428 06/21/25 1416   123/60 (!) 59 16 98.4 °F (36.9 °C) 99 %       MAP       --                Physical Exam    Nursing note and vitals reviewed.  Constitutional: She appears well-developed and well-nourished.   HENT:   Head: Normocephalic.   Eyes: EOM are normal. Pupils are equal, round, and reactive to light.   Neck: Neck supple.   Cardiovascular:  Normal rate, regular rhythm, normal heart sounds and intact distal pulses.           Pulmonary/Chest: Breath sounds normal. She has no wheezes.   Abdominal: Abdomen is soft. Bowel sounds are normal. She exhibits no mass. There is no abdominal tenderness (generalized). There is no rebound and no guarding.   Musculoskeletal:         General: No tenderness. Normal range of motion.      Cervical back: Neck supple.     Neurological: She is alert and oriented to person, place, and time. She has normal strength and normal reflexes. GCS score is 15. GCS eye subscore is 4. GCS verbal subscore is 5. GCS motor subscore is 6.   Skin: Skin is warm and dry.   Psychiatric: She has a normal mood and affect.         Medical Screening Exam   See Full Note    ED Course   Procedures  Labs Reviewed   COMPREHENSIVE METABOLIC PANEL - Abnormal       Result Value    Sodium 132 (*)     Potassium 4.6      Chloride 105      CO2 19 (*)     Anion Gap 13      Glucose 130 (*)     BUN 27 (*)     Creatinine 1.54 (*)     BUN/Creatinine Ratio 18      Calcium 8.3 (*)     Total Protein 7.2      Albumin 3.7      Globulin 3.5      A/G Ratio 1.1      Bilirubin, Total 0.3      Alk Phos 64      ALT 17      AST 23      eGFR 33 (*)    NT-PRO NATRIURETIC PEPTIDE - Abnormal    ProBNP 3,880 (*)    CBC WITH DIFFERENTIAL - Abnormal    WBC 5.33      RBC 2.94 (*)     Hemoglobin 8.7 (*)     Hematocrit 26.7 (*)     MCV 90.8      MCH 29.6      MCHC 32.6      RDW 13.1      Platelet Count 188      MPV 9.2 (*)     Neutrophils % 72.4 (*)     Lymphocytes % 16.3 (*)     Monocytes % 6.9 (*)     Eosinophils % 3.2      Basophils % 0.6      Immature Granulocytes % 0.6 (*)     nRBC, Auto 0.0       Neutrophils, Abs 3.86      Lymphocytes, Absolute 0.87 (*)     Monocytes, Absolute 0.37      Eosinophils, Absolute 0.17      Basophils, Absolute 0.03      Immature Granulocytes, Absolute 0.03      nRBC, Absolute 0.00      Diff Type Auto     TROPONIN I - Normal    Troponin I High Sensitivity 6.6     CBC W/ AUTO DIFFERENTIAL    Narrative:     The following orders were created for panel order CBC auto differential.  Procedure                               Abnormality         Status                     ---------                               -----------         ------                     CBC with Differential[[id number]]       Abnormal            Final result                 Please view results for these tests on the individual orders.        ECG Results              EKG 12-lead (Preliminary result)  Result time 06/21/25 14:43:03      Wet Read by Jj Baker FNP (06/21/25 14:43:03, [facility] Emergency Department, Emergency Medicine)    No STEMI, prolonged QT, no clear A-fib/flutter, HR 57                                  Imaging Results              X-Ray Chest AP (In process)                      Medications - No data to display  Medical Decision Making  84 y/o female presents to the emergency department for reported SOB and dizziness.  Ongoing per her  about 2-3 days.  Denies pain by their report.  No vomiting, no fever.  She has underlying history of epilepsy, A-fib, chronic anemia, and GERD.  Denies missing any of her recent medications.    Amount and/or Complexity of Data Reviewed  Labs:      Details: BNP 3800, troponin normal  Radiology:      Details: XR chest, mild fluid CHF  Discussion of management or test interpretation with external provider(s): Discussed findings with Dr. Zepeda, mild acute CHF, will treat 5 days fo lasix 20mg daily and have follow-up with primary care this week.  To return to emergency department if any worsening symptoms    Risk  Prescription drug  management.                                      Clinical Impression:   Final diagnoses:  [R06.02] SOB (shortness of breath)  [I50.9] Acute congestive heart failure, unspecified heart failure type (Primary)        ED Disposition Condition    Discharge Stable          ED Prescriptions       Medication Sig Dispense Start Date End Date Auth. Provider    furosemide (LASIX) 20 MG tablet Take 1 tablet (20 mg total) by mouth once daily. 5 tablet 6/21/2025 6/21/2026 Jj Baker FNP          Follow-up Information       Follow up With Specialties Details Why Contact Info    Farhad Culver MD Internal Medicine, Family Medicine, Hospitalist   [address] [place] [phone]  [phone]                   [1]   Social History  Tobacco Use    Smoking status: Never     Passive exposure: Never    Smokeless tobacco: Never   Substance Use Topics    Alcohol use: Never    Drug use: Never        Jj Baker FNP  06/21/25 1600

## 2025-06-21 NOTE — DISCHARGE INSTRUCTIONS
Take lasix 20mg daily for 5 days  If any worsening in shortness of breath then return to the emergency department   Follow-up with Dr. Culver this week for recheck

## 2025-06-24 LAB
OHS QRS DURATION: 90 MS
OHS QTC CALCULATION: 505 MS

## 2025-06-26 ENCOUNTER — OFFICE VISIT (OUTPATIENT)
Dept: FAMILY MEDICINE | Facility: CLINIC | Age: 85
End: 2025-06-26
Payer: MEDICARE

## 2025-06-26 VITALS
WEIGHT: 144.19 LBS | HEART RATE: 60 BPM | DIASTOLIC BLOOD PRESSURE: 69 MMHG | TEMPERATURE: 99 F | SYSTOLIC BLOOD PRESSURE: 138 MMHG | BODY MASS INDEX: 26.53 KG/M2 | OXYGEN SATURATION: 99 % | HEIGHT: 62 IN

## 2025-06-26 DIAGNOSIS — I50.9 CONGESTIVE HEART FAILURE, UNSPECIFIED HF CHRONICITY, UNSPECIFIED HEART FAILURE TYPE: Primary | ICD-10-CM

## 2025-06-26 DIAGNOSIS — R25.2 BILATERAL LEG CRAMPS: ICD-10-CM

## 2025-06-26 DIAGNOSIS — R30.0 DYSURIA: ICD-10-CM

## 2025-06-26 LAB
ANION GAP SERPL CALCULATED.3IONS-SCNC: 12 MMOL/L (ref 7–16)
BUN SERPL-MCNC: 19 MG/DL (ref 10–20)
BUN/CREAT SERPL: 13 (ref 6–20)
CALCIUM SERPL-MCNC: 8.9 MG/DL (ref 8.4–10.2)
CHLORIDE SERPL-SCNC: 102 MMOL/L (ref 98–107)
CO2 SERPL-SCNC: 25 MMOL/L (ref 23–31)
CREAT SERPL-MCNC: 1.47 MG/DL (ref 0.55–1.02)
EGFR (NO RACE VARIABLE) (RUSH/TITUS): 35 ML/MIN/1.73M2
GLUCOSE SERPL-MCNC: 90 MG/DL (ref 82–115)
MAGNESIUM SERPL-MCNC: 1.9 MG/DL (ref 1.6–2.6)
NT-PROBNP SERPL-MCNC: 2447 PG/ML (ref 1–450)
POTASSIUM SERPL-SCNC: 4.7 MMOL/L (ref 3.5–5.1)
SODIUM SERPL-SCNC: 134 MMOL/L (ref 136–145)

## 2025-06-26 PROCEDURE — 83735 ASSAY OF MAGNESIUM: CPT | Mod: ,,, | Performed by: CLINICAL MEDICAL LABORATORY

## 2025-06-26 PROCEDURE — 99213 OFFICE O/P EST LOW 20 MIN: CPT | Mod: ,,, | Performed by: NURSE PRACTITIONER

## 2025-06-26 PROCEDURE — 83880 ASSAY OF NATRIURETIC PEPTIDE: CPT | Mod: ,,, | Performed by: CLINICAL MEDICAL LABORATORY

## 2025-06-26 PROCEDURE — 80048 BASIC METABOLIC PNL TOTAL CA: CPT | Mod: ,,, | Performed by: CLINICAL MEDICAL LABORATORY

## 2025-06-26 NOTE — PROGRESS NOTES
[facility]  Chief Complaint      Chief Complaint   Patient presents with    Hospital Follow Up     Patient presents to clinic for hospital follow up. She presented to [facility] on 06/21/25 for CHF. No c/o productive cough or SOB this am.        History of Present Illness      Zbigniew Ventura is a 85 y.o. female with chronic conditions of Atrial fibrillation, Chronic anemia, chronic kidney disease, epilepsy, Hx of Stroke, and GERD. She is an established pt of Dr. Culver. She presents to the clinic today accompanied by her  for a follow up after a ER visit to [facility] 6/21/2025.  The pt complained of sob and dizziness and she was treated for CHF. BNP was 3880, CXR revealed cardiomegaly with pulmonary edema. Today she reports bilateral lower leg cramps. The pt denies cough or sob.    HPI     Past Medical History:  Past Medical History:   Diagnosis Date    Atrial fibrillation with RVR 10/06/2022    Atrial flutter 10/17/2022    followed by Dr. Alcazar    Chronic anemia     baseline hgb 8 to 9    Chronic kidney disease, stage 2 (mild)     baseline creat 1.1    Epilepsy, unspecified, not intractable, without status epilepticus     followed by Jj Goodrich NP, [facility] neurology    Gastroesophageal reflux disease 04/20/2023    Paroxysmal atrial fibrillation 11/07/2022    Stroke        Past Surgical History:   has no past surgical history on file.    Social History:  Social History[1]    I personally reviewed all past medical, surgical, and social.     Review of Systems   Constitutional:  Negative for appetite change, fatigue, fever and unexpected weight change.   Respiratory:  Negative for cough, shortness of breath and wheezing.    Cardiovascular:  Negative for chest pain and leg swelling.   Gastrointestinal:  Negative for abdominal pain, constipation, diarrhea, nausea and vomiting.   Genitourinary:  Negative for decreased urine volume, difficulty urinating, dysuria and flank pain.  "  Musculoskeletal:  Positive for gait problem. Negative for arthralgias and myalgias.   Skin:  Negative for color change and rash.   Neurological:  Positive for seizures, weakness and numbness. Negative for dizziness, syncope and headaches.   Psychiatric/Behavioral:  Negative for dysphoric mood.       Medications:  Encounter Medications[2]    Allergies:  Review of patient's allergies indicates:  No Known Allergies    Health Maintenance:  Immunization History   Administered Date(s) Administered    Influenza (FLUAD) - Quadrivalent - Adjuvanted - PF *Preferred* (65+) 10/06/2022, 11/10/2023    Pneumococcal Conjugate - 20 Valent 11/10/2023        Health Maintenance   Topic Date Due    TETANUS VACCINE  Never done    DEXA Scan  Never done    Shingles Vaccine (1 of 2) Never done    RSV Vaccine (Age 60+ and Pregnant patients) (1 - 1-dose 75+ series) Never done    Diabetic Eye Exam  07/01/2021    COVID-19 Vaccine (1 - 2024-25 season) Never done    Influenza Vaccine (Season Ended) 09/01/2025    Diabetes Urine Screening  11/21/2025    Hemoglobin A1c  12/19/2025    Lipid Panel  06/19/2026    Pneumococcal Vaccines (Age 50+)  Completed        Physical Exam      Vital Signs  Temp: 98.9 °F (37.2 °C)  Temp Source: Oral  Pulse: 60  SpO2: 99 %  BP: 138/69  BP Location: Right arm  Patient Position: Sitting  Pain Score: 0-No pain  Height and Weight  Height: 5' 2" (157.5 cm)  Weight: 65.4 kg (144 lb 3.2 oz)  BSA (Calculated - sq m): 1.69 sq meters  BMI (Calculated): 26.4  Weight in (lb) to have BMI = 25: 136.4]    Physical Exam  Constitutional:       General: She is not in acute distress.     Appearance: Normal appearance.   HENT:      Head: Normocephalic.      Mouth/Throat:      Mouth: Mucous membranes are moist.   Cardiovascular:      Rate and Rhythm: Normal rate and regular rhythm.      Pulses: Normal pulses.           Radial pulses are 2+ on the right side and 2+ on the left side.      Heart sounds: Normal heart sounds.   Pulmonary: "      Effort: Pulmonary effort is normal. No respiratory distress.      Breath sounds: Normal breath sounds. No wheezing, rhonchi or rales.   Abdominal:      General: Bowel sounds are normal.      Palpations: Abdomen is soft.      Tenderness: There is no abdominal tenderness.   Musculoskeletal:         General: Normal range of motion.      Cervical back: Neck supple.      Right lower leg: No edema.      Left lower leg: No edema.   Skin:     General: Skin is warm and dry.   Neurological:      Mental Status: She is alert and oriented to person, place, and time.   Psychiatric:         Mood and Affect: Mood normal.         Behavior: Behavior normal.        Laboratory:  CBC:  Recent Labs   Lab 10/24/24  1001 06/19/25  1059 06/21/25  1439   WBC 4.75 6.23 5.33   RBC 3.19 L 3.13 L 2.94 L   Hemoglobin 9.7 L 9.4 L 8.7 L   Hematocrit 31.2 L 30.2 L 26.7 L   Platelet Count 231 215 188   MCV 97.8 H 96.5 H 90.8   MCH 30.4 30.0 29.6   MCHC 31.1 L 31.1 L 32.6       LIPIDS:  Recent Labs   Lab 11/10/23  1130 12/14/23  0446 04/17/24  1522 06/19/25  1059   TSH  --  0.930 0.985  --    HDL Cholesterol 73 H 74 H  --  42   Cholesterol 166 155  --  142   Triglycerides 154 H 84  --  116   LDL Calculated 62 64  --  77   Cholesterol/HDL Ratio (Risk Factor) 2.3 2.1  --  3.4   Non-HDL 93 81  --  100       TSH:  Recent Labs   Lab 12/14/23  0446 04/17/24  1522   TSH 0.930 0.985       A1C:  Recent Labs   Lab 11/07/22  1109 08/02/23  1010 12/14/23  0446 10/24/24  1001 06/19/25  1059   Hemoglobin A1C 5.6 5.6 5.3 5.8 6.4       Lab Results   Component Value Date     (H) 06/21/2025     (L) 06/21/2025    K 4.6 06/21/2025     06/21/2025    CO2 19 (L) 06/21/2025    BUN 27 (H) 06/21/2025    CREATININE 1.54 (H) 06/21/2025    CALCIUM 8.3 (L) 06/21/2025    PROT 7.2 06/21/2025    ALBUMIN 3.7 06/21/2025    BILITOT 0.3 06/21/2025    ALKPHOS 64 06/21/2025    AST 23 06/21/2025    ALT 17 06/21/2025    ANIONGAP 13 06/21/2025    ESTGFRAFRICA 43  "06/29/2020    EGFRNONAA 54 (L) 06/21/2022       Lab Results   Component Value Date    WBC 5.33 06/21/2025    RBC 2.94 (L) 06/21/2025    HGB 8.7 (L) 06/21/2025    HCT 26.7 (L) 06/21/2025    MCV 90.8 06/21/2025    RDW 13.1 06/21/2025     06/21/2025        Lab Results   Component Value Date    CHOL 142 06/19/2025    TRIG 116 06/19/2025    HDL 42 06/19/2025    LDLCALC 77 06/19/2025       Lab Results   Component Value Date    TSH 0.985 04/17/2024       Lab Results   Component Value Date    HGBA1C 6.4 06/19/2025    ESTIMATEDAVG 137 06/19/2025        No components found for: "VITAMIND"    No results found for: "PSA"    Point Of Care Testing:  Nitrites, UA   Date Value Ref Range Status   12/13/2023 Negative Negative Final     Urobilinogen, UA   Date Value Ref Range Status   12/13/2023 Normal 0.2, 1.0, Normal mg/dL Final     pH, UA   Date Value Ref Range Status   12/13/2023 6.0 5.0 to 8.0 pH Units Final     Specific Gravity, UA   Date Value Ref Range Status   12/13/2023 1.009 <=1.030 Final     Ketones, UA   Date Value Ref Range Status   12/13/2023 Negative Negative mg/dL Final       No results found for: "[id number]", "RAPFLUA", "RAPFLUB"      Assessment/Plan     1. Congestive heart failure, unspecified HF chronicity, unspecified heart failure type  -     NT-Pro Natriuretic Peptide; Future; Expected date: 06/26/2025    2. Bilateral leg cramps  -     Basic Metabolic Panel; Future; Expected date: 06/26/2025  -     Magnesium; Future; Expected date: 06/26/2025    3. Dysuria  -     POCT URINALYSIS W/O SCOPE       Labs pending, will notify pt of results.    Return to clinic as scheduled for follow with PCP in July 2025.     Questions answered to desired level of satisfaction    Verbalized understanding to all information and instructions provided      RON [facility]             [1]   Social History  Tobacco Use    Smoking status: Never     Passive exposure: Never    Smokeless " tobacco: Never   Substance Use Topics    Alcohol use: Never    Drug use: Never   [2]   Outpatient Encounter Medications as of 6/26/2025   Medication Sig Note Dispense Refill    apixaban (ELIQUIS) 5 mg Tab TAKE ONE (1) TABLET BY MOUTH TWICE DAILY AS DIRECTED  60 tablet 11    atorvastatin (LIPITOR) 20 MG tablet Take 1 tablet (20 mg total) by mouth once daily.  90 tablet 1    ferrous sulfate (FEROSUL) 325 mg (65 mg iron) Tab tablet Take 1 tablet (325 mg total) by mouth once daily.  180 tablet 1    furosemide (LASIX) 20 MG tablet Take 1 tablet (20 mg total) by mouth once daily.  5 tablet 0    levETIRAcetam (KEPPRA) 500 MG Tab Take 1 tablet (500 mg total) by mouth 2 (two) times daily.  180 tablet 3    meclizine (ANTIVERT) 25 MG tablet Take 1 tablet (25 mg total) by mouth every 12 (twelve) hours as needed for Dizziness or Nausea.  90 tablet 1    metoprolol succinate (TOPROL-XL) 50 MG 24 hr tablet Take 1 tablet (50 mg total) by mouth once daily.  90 tablet 1    omeprazole (PRILOSEC) 20 MG capsule Take 1 capsule (20 mg total) by mouth once daily.  90 capsule 1    pioglitazone (ACTOS) 30 MG tablet Take 1 tablet (30 mg total) by mouth once daily.  90 tablet 1    potassium chloride (MICRO-K) 10 MEQ CpSR Take 1 capsule (10 mEq total) by mouth once daily.  90 capsule 0    [DISCONTINUED] tamsulosin (FLOMAX) 0.4 mg Cap Take 1 capsule (0.4 mg total) by mouth once daily. 7/2/2024: wrong patient 90 capsule 1     No facility-administered encounter medications on file as of 6/26/2025.

## 2025-06-30 RX ORDER — FUROSEMIDE 20 MG/1
20 TABLET ORAL DAILY
Qty: 30 TABLET | Refills: 0 | Status: SHIPPED | OUTPATIENT
Start: 2025-06-30 | End: 2026-06-30

## 2025-06-30 RX ORDER — FUROSEMIDE 40 MG/1
40 TABLET ORAL DAILY
Qty: 90 TABLET | Refills: 0 | OUTPATIENT
Start: 2025-06-30

## 2025-07-23 ENCOUNTER — OFFICE VISIT (OUTPATIENT)
Dept: FAMILY MEDICINE | Facility: CLINIC | Age: 85
End: 2025-07-23
Payer: MEDICARE

## 2025-07-23 VITALS
WEIGHT: 146 LBS | BODY MASS INDEX: 26.87 KG/M2 | DIASTOLIC BLOOD PRESSURE: 68 MMHG | HEIGHT: 62 IN | SYSTOLIC BLOOD PRESSURE: 124 MMHG | HEART RATE: 61 BPM | RESPIRATION RATE: 17 BRPM | TEMPERATURE: 98 F | OXYGEN SATURATION: 96 %

## 2025-07-23 DIAGNOSIS — I10 ESSENTIAL (PRIMARY) HYPERTENSION: ICD-10-CM

## 2025-07-23 DIAGNOSIS — R60.0 BILATERAL EDEMA OF LOWER EXTREMITY: Primary | ICD-10-CM

## 2025-07-23 LAB
ANION GAP SERPL CALCULATED.3IONS-SCNC: 15 MMOL/L (ref 7–16)
BUN SERPL-MCNC: 29 MG/DL (ref 10–20)
BUN/CREAT SERPL: 14 (ref 6–20)
CALCIUM SERPL-MCNC: 8.7 MG/DL (ref 8.4–10.2)
CHLORIDE SERPL-SCNC: 105 MMOL/L (ref 98–107)
CO2 SERPL-SCNC: 22 MMOL/L (ref 23–31)
CREAT SERPL-MCNC: 2.06 MG/DL (ref 0.55–1.02)
D DIMER PPP FEU-MCNC: 0.79 ΜG/ML (ref 0–0.47)
EGFR (NO RACE VARIABLE) (RUSH/TITUS): 24 ML/MIN/1.73M2
GLUCOSE SERPL-MCNC: 130 MG/DL (ref 82–115)
POTASSIUM SERPL-SCNC: 5 MMOL/L (ref 3.5–5.1)
SODIUM SERPL-SCNC: 137 MMOL/L (ref 136–145)

## 2025-07-23 PROCEDURE — 99213 OFFICE O/P EST LOW 20 MIN: CPT | Mod: ,,, | Performed by: INTERNAL MEDICINE

## 2025-07-23 PROCEDURE — 80048 BASIC METABOLIC PNL TOTAL CA: CPT | Mod: ,,, | Performed by: CLINICAL MEDICAL LABORATORY

## 2025-07-24 ENCOUNTER — RESULTS FOLLOW-UP (OUTPATIENT)
Dept: FAMILY MEDICINE | Facility: CLINIC | Age: 85
End: 2025-07-24
Payer: MEDICARE

## 2025-07-27 RX ORDER — FUROSEMIDE 40 MG/1
40 TABLET ORAL EVERY MORNING
Qty: 30 TABLET | Refills: 1 | Status: SHIPPED | OUTPATIENT
Start: 2025-07-27 | End: 2026-07-27

## 2025-07-27 RX ORDER — POTASSIUM CHLORIDE 750 MG/1
10 CAPSULE, EXTENDED RELEASE ORAL DAILY
Qty: 30 CAPSULE | Refills: 1 | Status: SHIPPED | OUTPATIENT
Start: 2025-07-27

## 2025-07-27 NOTE — PROGRESS NOTES
"Subjective:       Patient ID: Zbigniew Ventura is a 85 y.o. female.    Chief Complaint: Seizures (Follow up )    History of Present Illness    CHIEF COMPLAINT:  Patient presents today for follow up.    EDEMA:  She reports bilateral leg swelling. Her Lasix was recently increased from 20mg to 40mg for fluid retention.    CURRENT MEDICATIONS:  She is currently taking:  - Lasix 40mg for fluid retention  - Potassium supplement  - Cholesterol medication  - Anti-emetic for dizziness and nausea  - Anti-reflux medication for GERD and abdominal pain  - Diabetes medication  - Seizure medication 500mg twice daily  - Metoprolol 100mg and 50mg for heart rate control  - Antihypertensive medication         Current Medications:  Current Medications[1]           ROS  Twelve point system reviewed, unremarkable except for stated above in HPI.        Objective:         Vitals:    07/23/25 1531   BP: 124/68   BP Location: Left arm   Patient Position: Sitting   Pulse: 61   Resp: 17   Temp: 97.8 °F (36.6 °C)   TempSrc: Temporal   SpO2: 96%   Weight: 66.2 kg (146 lb)   Height: 5' 2" (1.575 m)        Physical Exam     Patient is awake alert oriented person place and  Lungs are clear to auscultation bilaterally no crackles or wheezes   Cardiovascular S1-S2 regular rate and rhythm no murmurs rubs or gallops   Abdomen is soft positive bowel sounds nontender,     extremities +3 bilateral lower extremity pitting edema.     Neuro no focal neurological deficits  Skin warm and dry.     Last Labs:     Office Visit on 07/23/2025   Component Date Value    D-Dimer 07/23/2025 0.79 (H)     Sodium 07/23/2025 137     Potassium 07/23/2025 5.0     Chloride 07/23/2025 105     CO2 07/23/2025 22 (L)     Anion Gap 07/23/2025 15     Glucose 07/23/2025 130 (H)     BUN 07/23/2025 29 (H)     Creatinine 07/23/2025 2.06 (H)     BUN/Creatinine Ratio 07/23/2025 14     Calcium 07/23/2025 8.7     eGFR 07/23/2025 24 (L)    Office Visit on 06/26/2025   Component Date Value    " ProBNP 06/26/2025 2,447 (H)     Sodium 06/26/2025 134 (L)     Potassium 06/26/2025 4.7     Chloride 06/26/2025 102     CO2 06/26/2025 25     Anion Gap 06/26/2025 12     Glucose 06/26/2025 90     BUN 06/26/2025 19     Creatinine 06/26/2025 1.47 (H)     BUN/Creatinine Ratio 06/26/2025 13     Calcium 06/26/2025 8.9     eGFR 06/26/2025 35 (L)     Magnesium 06/26/2025 1.9        Last Imaging:  X-Ray Chest AP  Narrative: EXAMINATION:  XR CHEST AP PORTABLE    CLINICAL HISTORY:  sob;    TECHNIQUE:  Single frontal view of the chest was performed.    COMPARISON:  10/24/2024.    FINDINGS:  Monitoring EKG leads are present.  The trachea is unremarkable.  There are calcifications of the aortic knob.  The cardiac silhouette is enlarged.  There are small bilateral pleural effusions.  There is no evidence of a pneumothorax.  There is no evidence of pneumomediastinum.  There is mild pulmonary vascular congestion.  There is no focal consolidation.    There is no evidence of free air beneath the hemidiaphragms.  The osseous structures demonstrate degenerative changes.  Impression: Cardiomegaly with small bilateral pleural effusions and pulmonary vascular congestion.  The findings are suggestive of pulmonary edema secondary to CHF.    Electronically signed by: Malik Lam MD  Date:    06/21/2025  Time:    17:42         **Labs and x-rays personally reviewed by me    ** reviewed           Assessment & Plan:   Assessment & Plan    R60.0 Bilateral edema of lower extremity  I10 Essential (primary) hypertension  E11.69 Type 2 diabetes mellitus with other specified complication, without long-term current use of insulin    IMPRESSION:  - Assessed vital signs, noting BP and heart rate are WNL.  - Evaluated leg swelling, determining increased fluid retention.  - Reviewed current medication regimen, identifying redundancies and outdated prescriptions.  - Discontinued Metoprolol.    R60.0 BILATERAL EDEMA OF LOWER EXTREMITY:  - Explained the  purpose of Lasix as a fluid pill for leg swelling.  - Increased dosage from 20 mg to 40 mg daily.  - Instructed patient to take Lasix and potassium together.  - Scheduled follow up in 2 weeks to reassess leg edema.    I10 ESSENTIAL (PRIMARY) HYPERTENSION:  - Continued Metoprolol Succinate 100 mg daily and 50 mg formulation.  - Maintained current antihypertensive medication regimen.  - Clarified the function of blood pressure medications in the overall treatment plan.    ** DIAGNOSES NOT IN VISIT DX OR FOR REVIEW **  E11.69 TYPE 2 DIABETES MELLITUS WITH OTHER SPECIFIED COMPLICATION, WITHOUT LONG-TERM CURRENT USE OF INSULIN:  - Continued current diabetes medication regimen for glycemic control.  - Monitored patient's adherence to prescribed hypoglycemic agents and evaluated diabetes control with current medications.  - Clarified the function of diabetes medications within the overall treatment plan.           1. Bilateral edema of lower extremity  -     Basic Metabolic Panel; Future; Expected date: 07/23/2025  -     D-Dimer, Quantitative; Future; Expected date: 07/23/2025  -     US Lower Extremity Veins Bilateral; Future; Expected date: 07/23/2025  -     furosemide (LASIX) 40 MG tablet; Take 1 tablet (40 mg total) by mouth every morning.  Dispense: 30 tablet; Refill: 1    2. Chronic Essential (primary) hypertension  -     potassium chloride (MICRO-K) 10 MEQ CpSR; Take 1 capsule (10 mEq total) by mouth once daily.  Dispense: 30 capsule; Refill: 1            Farhad Culver MD  This note was generated with the assistance of ambient listening technology. Verbal consent was obtained by the patient and accompanying visitor(s) for the recording of patient appointment to facilitate this note. I attest to having reviewed and edited the generated note for accuracy, though some syntax or spelling errors may persist. Please contact the author of this note for any clarification.            [1]   Current Outpatient  Medications:     apixaban (ELIQUIS) 5 mg Tab, TAKE ONE (1) TABLET BY MOUTH TWICE DAILY AS DIRECTED, Disp: 60 tablet, Rfl: 11    atorvastatin (LIPITOR) 20 MG tablet, Take 1 tablet (20 mg total) by mouth once daily., Disp: 90 tablet, Rfl: 1    ferrous sulfate (FEROSUL) 325 mg (65 mg iron) Tab tablet, Take 1 tablet (325 mg total) by mouth once daily., Disp: 180 tablet, Rfl: 1    levETIRAcetam (KEPPRA) 500 MG Tab, Take 1 tablet (500 mg total) by mouth 2 (two) times daily., Disp: 180 tablet, Rfl: 3    meclizine (ANTIVERT) 25 MG tablet, Take 1 tablet (25 mg total) by mouth every 12 (twelve) hours as needed for Dizziness or Nausea., Disp: 90 tablet, Rfl: 1    metoprolol succinate (TOPROL-XL) 50 MG 24 hr tablet, Take 1 tablet (50 mg total) by mouth once daily., Disp: 90 tablet, Rfl: 1    omeprazole (PRILOSEC) 20 MG capsule, Take 1 capsule (20 mg total) by mouth once daily., Disp: 90 capsule, Rfl: 1    pioglitazone (ACTOS) 30 MG tablet, Take 1 tablet (30 mg total) by mouth once daily., Disp: 90 tablet, Rfl: 1    furosemide (LASIX) 40 MG tablet, Take 1 tablet (40 mg total) by mouth every morning., Disp: 30 tablet, Rfl: 1    potassium chloride (MICRO-K) 10 MEQ CpSR, Take 1 capsule (10 mEq total) by mouth once daily., Disp: 30 capsule, Rfl: 1

## 2025-08-04 RX ORDER — NAPROXEN 375 MG/1
TABLET ORAL
Qty: 30 TABLET | Refills: 0 | OUTPATIENT
Start: 2025-08-04

## 2025-08-11 PROBLEM — R30.0 DYSURIA: Status: RESOLVED | Noted: 2025-06-26 | Resolved: 2025-08-11

## 2025-08-26 ENCOUNTER — OFFICE VISIT (OUTPATIENT)
Dept: FAMILY MEDICINE | Facility: CLINIC | Age: 85
End: 2025-08-26
Payer: MEDICARE

## 2025-08-26 VITALS
WEIGHT: 139 LBS | BODY MASS INDEX: 25.58 KG/M2 | SYSTOLIC BLOOD PRESSURE: 102 MMHG | HEIGHT: 62 IN | OXYGEN SATURATION: 99 % | TEMPERATURE: 98 F | RESPIRATION RATE: 17 BRPM | DIASTOLIC BLOOD PRESSURE: 60 MMHG | HEART RATE: 60 BPM

## 2025-08-26 DIAGNOSIS — R60.0 BILATERAL EDEMA OF LOWER EXTREMITY: ICD-10-CM

## 2025-08-26 DIAGNOSIS — K21.9 GASTROESOPHAGEAL REFLUX DISEASE, UNSPECIFIED WHETHER ESOPHAGITIS PRESENT: ICD-10-CM

## 2025-08-26 DIAGNOSIS — E11.9 TYPE 2 DIABETES MELLITUS WITHOUT COMPLICATION, WITHOUT LONG-TERM CURRENT USE OF INSULIN: ICD-10-CM

## 2025-08-26 DIAGNOSIS — I10 HYPERTENSION, ESSENTIAL: ICD-10-CM

## 2025-08-26 DIAGNOSIS — I10 ESSENTIAL (PRIMARY) HYPERTENSION: ICD-10-CM

## 2025-08-26 DIAGNOSIS — R56.9 SEIZURE: ICD-10-CM

## 2025-08-26 DIAGNOSIS — N17.9 ACUTE KIDNEY INJURY: Primary | ICD-10-CM

## 2025-08-26 DIAGNOSIS — R42 VERTIGO: ICD-10-CM

## 2025-08-26 DIAGNOSIS — D64.9 CHRONIC ANEMIA: ICD-10-CM

## 2025-08-26 DIAGNOSIS — E78.5 DYSLIPIDEMIA: ICD-10-CM

## 2025-08-26 PROCEDURE — 99214 OFFICE O/P EST MOD 30 MIN: CPT | Mod: ,,, | Performed by: INTERNAL MEDICINE

## 2025-08-26 RX ORDER — OMEPRAZOLE 20 MG/1
20 CAPSULE, DELAYED RELEASE ORAL DAILY
Qty: 90 CAPSULE | Refills: 1 | Status: SHIPPED | OUTPATIENT
Start: 2025-08-26

## 2025-08-26 RX ORDER — POTASSIUM CHLORIDE 750 MG/1
10 CAPSULE, EXTENDED RELEASE ORAL DAILY
Qty: 30 CAPSULE | Refills: 1 | Status: SHIPPED | OUTPATIENT
Start: 2025-08-26

## 2025-08-26 RX ORDER — ATORVASTATIN CALCIUM 20 MG/1
20 TABLET, FILM COATED ORAL DAILY
Qty: 90 TABLET | Refills: 1 | Status: SHIPPED | OUTPATIENT
Start: 2025-08-26

## 2025-08-26 RX ORDER — MECLIZINE HCL 25MG 25 MG/1
25 TABLET, CHEWABLE ORAL EVERY 12 HOURS PRN
Qty: 90 TABLET | Refills: 1 | Status: SHIPPED | OUTPATIENT
Start: 2025-08-26

## 2025-08-26 RX ORDER — FUROSEMIDE 40 MG/1
40 TABLET ORAL EVERY MORNING
Qty: 30 TABLET | Refills: 1 | Status: SHIPPED | OUTPATIENT
Start: 2025-08-26

## 2025-08-26 RX ORDER — LEVETIRACETAM 500 MG/1
500 TABLET ORAL 2 TIMES DAILY
Qty: 180 TABLET | Refills: 3 | Status: SHIPPED | OUTPATIENT
Start: 2025-08-26

## 2025-08-26 RX ORDER — FERROUS SULFATE 325(65) MG
325 TABLET ORAL DAILY
Qty: 180 TABLET | Refills: 1 | Status: SHIPPED | OUTPATIENT
Start: 2025-08-26

## 2025-08-26 RX ORDER — METOPROLOL SUCCINATE 50 MG/1
50 TABLET, EXTENDED RELEASE ORAL DAILY
Qty: 90 TABLET | Refills: 1 | Status: SHIPPED | OUTPATIENT
Start: 2025-08-26

## 2025-08-26 RX ORDER — PIOGLITAZONE 30 MG/1
30 TABLET ORAL DAILY
Qty: 90 TABLET | Refills: 1 | Status: SHIPPED | OUTPATIENT
Start: 2025-08-26